# Patient Record
Sex: MALE | Race: WHITE | NOT HISPANIC OR LATINO | Employment: OTHER | URBAN - METROPOLITAN AREA
[De-identification: names, ages, dates, MRNs, and addresses within clinical notes are randomized per-mention and may not be internally consistent; named-entity substitution may affect disease eponyms.]

---

## 2021-09-20 ENCOUNTER — APPOINTMENT (OUTPATIENT)
Dept: GENERAL RADIOLOGY | Facility: CLINIC | Age: 61
End: 2021-09-20
Attending: PHYSICIAN ASSISTANT
Payer: COMMERCIAL

## 2021-09-20 ENCOUNTER — HOSPITAL ENCOUNTER (EMERGENCY)
Facility: CLINIC | Age: 61
Discharge: HOME | End: 2021-09-20
Attending: PHYSICIAN ASSISTANT | Admitting: PHYSICIAN ASSISTANT
Payer: COMMERCIAL

## 2021-09-20 VITALS
WEIGHT: 197 LBS | RESPIRATION RATE: 18 BRPM | OXYGEN SATURATION: 96 % | DIASTOLIC BLOOD PRESSURE: 73 MMHG | TEMPERATURE: 98.2 F | SYSTOLIC BLOOD PRESSURE: 141 MMHG | HEART RATE: 82 BPM

## 2021-09-20 DIAGNOSIS — R50.9 FEVER: ICD-10-CM

## 2021-09-20 DIAGNOSIS — R50.9 FEVER, UNSPECIFIED FEVER CAUSE: ICD-10-CM

## 2021-09-20 DIAGNOSIS — D72.829 LEUKOCYTOSIS: ICD-10-CM

## 2021-09-20 DIAGNOSIS — R05.9 COUGH: ICD-10-CM

## 2021-09-20 LAB
ALBUMIN UR-MCNC: NEGATIVE MG/DL
ANION GAP SERPL CALCULATED.3IONS-SCNC: 8 MMOL/L (ref 3–14)
APPEARANCE UR: CLEAR
ATRIAL RATE - MUSE: 65 BPM
BASOPHILS # BLD AUTO: 0 10E3/UL (ref 0–0.2)
BASOPHILS NFR BLD AUTO: 0 %
BILIRUB UR QL STRIP: NEGATIVE
BUN SERPL-MCNC: 18 MG/DL (ref 7–30)
CALCIUM SERPL-MCNC: 8.7 MG/DL (ref 8.5–10.1)
CHLORIDE BLD-SCNC: 102 MMOL/L (ref 94–109)
CO2 SERPL-SCNC: 21 MMOL/L (ref 20–32)
COLOR UR AUTO: ABNORMAL
CREAT SERPL-MCNC: 1.53 MG/DL (ref 0.66–1.25)
DIASTOLIC BLOOD PRESSURE - MUSE: NORMAL MMHG
EOSINOPHIL # BLD AUTO: 0 10E3/UL (ref 0–0.7)
EOSINOPHIL NFR BLD AUTO: 0 %
ERYTHROCYTE [DISTWIDTH] IN BLOOD BY AUTOMATED COUNT: 12.8 % (ref 10–15)
GFR SERPL CREATININE-BSD FRML MDRD: 48 ML/MIN/1.73M2
GLUCOSE BLD-MCNC: 119 MG/DL (ref 70–99)
GLUCOSE UR STRIP-MCNC: NEGATIVE MG/DL
HCT VFR BLD AUTO: 40.4 % (ref 40–53)
HGB BLD-MCNC: 13.4 G/DL (ref 13.3–17.7)
HGB UR QL STRIP: ABNORMAL
IMM GRANULOCYTES # BLD: 0.1 10E3/UL
IMM GRANULOCYTES NFR BLD: 1 %
INTERPRETATION ECG - MUSE: NORMAL
KETONES UR STRIP-MCNC: NEGATIVE MG/DL
LEUKOCYTE ESTERASE UR QL STRIP: NEGATIVE
LYMPHOCYTES # BLD AUTO: 0.7 10E3/UL (ref 0.8–5.3)
LYMPHOCYTES NFR BLD AUTO: 5 %
MCH RBC QN AUTO: 28.3 PG (ref 26.5–33)
MCHC RBC AUTO-ENTMCNC: 33.2 G/DL (ref 31.5–36.5)
MCV RBC AUTO: 85 FL (ref 78–100)
MONOCYTES # BLD AUTO: 1.3 10E3/UL (ref 0–1.3)
MONOCYTES NFR BLD AUTO: 8 %
NEUTROPHILS # BLD AUTO: 13.1 10E3/UL (ref 1.6–8.3)
NEUTROPHILS NFR BLD AUTO: 86 %
NITRATE UR QL: NEGATIVE
NRBC # BLD AUTO: 0 10E3/UL
NRBC BLD AUTO-RTO: 0 /100
P AXIS - MUSE: 45 DEGREES
PH UR STRIP: 6 [PH] (ref 5–7)
PLATELET # BLD AUTO: 271 10E3/UL (ref 150–450)
POTASSIUM BLD-SCNC: 4 MMOL/L (ref 3.4–5.3)
PR INTERVAL - MUSE: 128 MS
QRS DURATION - MUSE: 96 MS
QT - MUSE: 464 MS
QTC - MUSE: 482 MS
R AXIS - MUSE: 0 DEGREES
RBC # BLD AUTO: 4.74 10E6/UL (ref 4.4–5.9)
RBC URINE: 2 /HPF
SARS-COV-2 RNA RESP QL NAA+PROBE: NEGATIVE
SODIUM SERPL-SCNC: 131 MMOL/L (ref 133–144)
SP GR UR STRIP: 1 (ref 1–1.03)
SYSTOLIC BLOOD PRESSURE - MUSE: NORMAL MMHG
T AXIS - MUSE: -9 DEGREES
TROPONIN I SERPL-MCNC: <0.015 UG/L (ref 0–0.04)
UROBILINOGEN UR STRIP-MCNC: NORMAL MG/DL
VENTRICULAR RATE- MUSE: 65 BPM
WBC # BLD AUTO: 15.2 10E3/UL (ref 4–11)
WBC URINE: <1 /HPF

## 2021-09-20 PROCEDURE — 80048 BASIC METABOLIC PNL TOTAL CA: CPT | Performed by: PHYSICIAN ASSISTANT

## 2021-09-20 PROCEDURE — 71045 X-RAY EXAM CHEST 1 VIEW: CPT

## 2021-09-20 PROCEDURE — 84484 ASSAY OF TROPONIN QUANT: CPT | Performed by: PHYSICIAN ASSISTANT

## 2021-09-20 PROCEDURE — U0003 INFECTIOUS AGENT DETECTION BY NUCLEIC ACID (DNA OR RNA); SEVERE ACUTE RESPIRATORY SYNDROME CORONAVIRUS 2 (SARS-COV-2) (CORONAVIRUS DISEASE [COVID-19]), AMPLIFIED PROBE TECHNIQUE, MAKING USE OF HIGH THROUGHPUT TECHNOLOGIES AS DESCRIBED BY CMS-2020-01-R: HCPCS | Performed by: PHYSICIAN ASSISTANT

## 2021-09-20 PROCEDURE — 258N000003 HC RX IP 258 OP 636: Performed by: PHYSICIAN ASSISTANT

## 2021-09-20 PROCEDURE — 96360 HYDRATION IV INFUSION INIT: CPT

## 2021-09-20 PROCEDURE — C9803 HOPD COVID-19 SPEC COLLECT: HCPCS

## 2021-09-20 PROCEDURE — 93005 ELECTROCARDIOGRAM TRACING: CPT

## 2021-09-20 PROCEDURE — 99285 EMERGENCY DEPT VISIT HI MDM: CPT | Mod: 25

## 2021-09-20 PROCEDURE — 36415 COLL VENOUS BLD VENIPUNCTURE: CPT | Performed by: PHYSICIAN ASSISTANT

## 2021-09-20 PROCEDURE — 81001 URINALYSIS AUTO W/SCOPE: CPT | Performed by: PHYSICIAN ASSISTANT

## 2021-09-20 PROCEDURE — 85025 COMPLETE CBC W/AUTO DIFF WBC: CPT | Performed by: PHYSICIAN ASSISTANT

## 2021-09-20 RX ORDER — ONDANSETRON 2 MG/ML
4 INJECTION INTRAMUSCULAR; INTRAVENOUS ONCE
Status: DISCONTINUED | OUTPATIENT
Start: 2021-09-20 | End: 2021-09-20 | Stop reason: HOSPADM

## 2021-09-20 RX ORDER — DOXYCYCLINE 100 MG/1
100 CAPSULE ORAL 2 TIMES DAILY
Qty: 14 CAPSULE | Refills: 0 | Status: ON HOLD | OUTPATIENT
Start: 2021-09-20 | End: 2021-09-25

## 2021-09-20 RX ORDER — DOXYCYCLINE 100 MG/1
100 CAPSULE ORAL 2 TIMES DAILY
Qty: 14 CAPSULE | Refills: 0 | Status: SHIPPED | OUTPATIENT
Start: 2021-09-20 | End: 2021-09-20

## 2021-09-20 RX ADMIN — SODIUM CHLORIDE 1000 ML: 9 INJECTION, SOLUTION INTRAVENOUS at 12:38

## 2021-09-20 ASSESSMENT — ENCOUNTER SYMPTOMS
DIARRHEA: 1
BLOOD IN STOOL: 0
SHORTNESS OF BREATH: 1
COUGH: 1
VOMITING: 0
FEVER: 1
ABDOMINAL PAIN: 0
APPETITE CHANGE: 1

## 2021-09-20 NOTE — ED PROVIDER NOTES
History   Chief Complaint:  Cough and Fever     The history is provided by the patient and medical records.      Boom Daniel is a 61 year old male with history of CKD, JARED, Alexander's esophagus, and hyperlipidemia who presents with two days of non-bloody diarrhea and decreased appetite. He also notes two weeks of cough and today noted a fever. He took ibuprofen prior to arrival. He reports associated shortness of breath and denies chest pain, vomiting, abdominal pain, urinary symptoms, sore throat, or any other concerning symptoms. Of note the patient is fully vaccinated for Covid.     Review of Systems   Constitutional: Positive for appetite change (decrease) and fever.   Respiratory: Positive for cough and shortness of breath.    Cardiovascular: Negative for chest pain.   Gastrointestinal: Positive for diarrhea. Negative for abdominal pain, blood in stool and vomiting.   All other systems reviewed and are negative.        Allergies:  No known drug allergies    Medications:  Flonase  Nicotine  Prilosec  Cozaar    Past Medical History:    Hyperopia  Cataract  Choroidal Nevus, let  CKD, stage 2  Acute Gout Involving Toe of Left Foot  JARED on CPAP  Alexander's Esophagus  Chronic Rhinitis  Hyperlipidemia  Tobacco use Disorder  Esophageal Reflux  Deviated Nasal Septum  MRSA Nasal Colonization    Past Surgical History:    Tonsillectomy    Family History:    Father - Gout, Heart Attack, High Cholesterol, Obesity  Brother - Prostate Cancer    Social History:  Arrives alone    Physical Exam     Patient Vitals for the past 24 hrs:   BP Temp Temp src Pulse Resp SpO2 Weight   09/20/21 1500 (!) 141/73 98.2  F (36.8  C) -- -- -- 96 % --   09/20/21 1150 -- -- -- -- -- -- 89.4 kg (197 lb)   09/20/21 1148 109/65 98.8  F (37.1  C) Oral 82 18 96 % --       Physical Exam  General: Alert, cooperative. Well appearing.   Head:  Scalp is atraumatic.  Eyes:  The pupils are equal, round, and reactive to light. Normal conjunctiva.   ENT:                                       Ears:  The external ears are normal. TM's non-erythematous. External canals normal.    Nose:  The external nose is normal.  Throat:  The oropharynx is normal without erythema. Mucus membranes are dry.                 Neck:  Normal range of motion.   CV:  Normal rate. No murmur. 2+ radial pulses  Resp:  Breath sounds are clear bilaterally. Non-labored, no retractions or accessory muscle use.  GI:  Abdomen is soft, no distension, no tenderness.   MS:  Normal range of motion. No acute deformities.   Skin:  Warm and dry. No rash.   Neuro:  Alert. Strength and sensation grossly intact.   Psych:  Awake. Alert.  Appropriate interactions.     Emergency Department Course   ECG (14:58:23):  Indication: Screening for cardiovascular disease.   Rate 65 bpm. IN interval 128. QRS duration 96. QT/QTc 464/482. P-R-T axes 45 0 -9.   Interpretation: Sinus rhythm with premature atrial complexes. Nonspecific ST abnormality. Prolonged QT. Abnormal ECG.     Imaging:  XR Chest 1 view  IMPRESSION: Unremarkable single view of the chest.   Reading per radiology    Laboratory:  CBC: WBC 15.2 (H), HGB 13.4,    BMP: Glucose 119 (H), GFR 48 (L), Sodium 131 (L), o/w WNL (Creatinine 1.53 (H))     Troponin: (Collected 1239) <0.015    UA: Blood Small (!), o/w Negative    Symptomatic Influenza A/B & SARS-CoV2 (COVID19) Virus PCR Multiplex: neg    Emergency Department Course:    Reviewed:  I reviewed nursing notes, vitals, past medical history and care everywhere    Assessments:  1225 I obtained history and examined the patient as noted above.   1340 I rechecked the patient and explained findings.  Repeat abdominal examination remains benign without tenderness.  1533 Patient rechecked and updated.   1545 Set for discharge.    Interventions:  1238 Normal Saline 1000 mL IV    Disposition:  The patient was discharged to home.     Impression & Plan   Medical Decision Making:  Boom Daniel is a 61 year old male  presents to the emergency department with decreased appetite, nonbloody diarrhea, cough, and fever.  Vitals and examination unremarkable besides dry mucous membranes.  Patient is afebrile here.  Lungs clear to auscultation.  Chest x-ray without evidence of pneumonia.  Covid 19 currently pending upon his discharge, but returned in the meantime negative.  EKG without ischemic changes and screening troponin negative.  He has no chest pain and no suspicion for acute coronary syndrome.  Patient reports decreased appetite over the last 24 hours and has had decreased p.o. intake.  Leukocytosis of 15.2, at this time, discussed unclear source at this time.  Certainly could be due to dehydration.  Urine without sign of infection.  Patient had no abdominal pain and initial and repeat abdominal examination benign without tenderness.  No indication for CT at this time given the benign abdomen. He notes diarrhea since yesterday, he was unable to provide a stool sample here and I believe C. difficile and infectious diarrhea less likely.      Patient reports 2 weeks of progressively worsening cough and given the fever today, will cover for possible atypical pneumonia with doxycycline.  I explained the unclear source of fever and leukocytosis at this time and importance of strict return precautions should any new or concerning symptoms develop including persistent fevers, abdominal pain, or any other concerning symptoms develop.    Covid-19  Boom Daniel was evaluated during a global COVID-19 pandemic, which necessitated consideration that the patient might be at risk for infection with the SARS-CoV-2 virus that causes COVID-19.   Applicable protocols for evaluation were followed during the patient's care.   COVID-19 was considered as part of the patient's evaluation. The plan for testing is:  a test was obtained during this visit.    Diagnosis:    ICD-10-CM    1. Fever  R50.9    2. Cough  R05    3. Leukocytosis  D72.829         Discharge Medications:  New Prescriptions    DOXYCYCLINE HYCLATE (VIBRAMYCIN) 100 MG CAPSULE    Take 1 capsule (100 mg) by mouth 2 times daily for 7 days       Scribe Disclosure:  I, Selena Bridges, am serving as a scribe at 11:58 AM on 9/20/2021 to document services personally performed by Margarita Cali PA-C based on my observations and the provider's statements to me.      Margarita Cali PA-C  09/20/21 5547

## 2021-09-20 NOTE — DISCHARGE INSTRUCTIONS
Discharge Instructions  Bronchitis, Pneumonia, Bronchospasm    You were seen today for a chest infection or inflammation. If your provider decided this was due to a bacterial infection, you may need an antibiotic. Sometimes these are caused by a virus, and then an antibiotic will not help.     Generally, every Emergency Department visit should have a follow-up clinic visit with either a primary or a specialty clinic/provider. Please follow-up as instructed by your emergency provider today.    Return to the Emergency Department if:  Your breathing gets much worse.  You are very weak, or feel much more ill.  You develop new symptoms, such as chest pain.  You cough up blood.  You are vomiting (throwing up) enough that you cannot keep fluids or your medicine down.    What can I do to help myself?  Fill any prescriptions the provider gave you and take them right away--especially antibiotics. Be sure to finish the whole antibiotic prescription.  You may be given a prescription for an inhaler, which can help loosen tight air passages.  Use this as needed, but not more often than directed. Inhalers work much better when used with a spacer.   You may be given a prescription for a steroid to reduce inflammation. Used long-term, these can have side effects, but for short-term use they are safe. You may notice restlessness or increased appetite.      You may use non-prescription cough or cold medicines. Cough medicines may help, but don t make the cough go away completely.   Avoid smoke, because this can make your symptoms worse. If you smoke, this may be a good time to quit! Consider using nicotine lozenges, gum, or patches to reduce cravings.   If you have a fever, Tylenol  (acetaminophen), Motrin  (ibuprofen), or Advil  (ibuprofen) may help bring fever down and may help you feel more comfortable. Be sure to read and follow the package directions, and ask your provider if you have questions.  Be sure to get your flu shot each  year.  For certain ages, the pneumonia shot can help prevent pneumonia.  If you were given a prescription for medicine here today, be sure to read all of the information (including the package insert) that comes with your prescription.  This will include important information about the medicine, its side effects, and any warnings that you need to know about.  The pharmacist who fills the prescription can provide more information and answer questions you may have about the medicine.  If you have questions or concerns that the pharmacist cannot address, please call or return to the Emergency Department.     Remember that you can always come back to the Emergency Department if you are not able to see your regular provider in the amount of time listed above, if you get any new symptoms, or if there is anything that worries you.       Discharge Instructions  COVID-19    COVID-19 is the disease caused by a new coronavirus. The virus spreads from person-to-person primarily by droplets when an infected person coughs or sneezes and the droplets are then breathed in by another person. There are tests available to diagnose COVID-19. You may have been diagnosed with COVID, may be being tested for COVID and have a pending test result, or may have been exposed to COVID.    Symptoms of COVID-19  Many people have no symptoms or mild symptoms.  Symptoms may usually appear 4 to 5 days (up to 14 days) after contact with a person with COVID-19. Some people will get severe symptoms and pneumonia. Usual symptoms are:     ? Fever  ? Cough  ? Trouble breathing    Less common symptoms are: Headache, body aches, sore throat, sneezing, diarrhea, loss of taste or smell.    Isolation and Quarantine    You may have been seen because you have symptoms, had an exposure, or had some other concern about possible COVID. The best way to stop the spread of the virus is to avoid contact with others.    Isolation refers to sick people staying away from  people who are not sick. A person in quarantine is limiting activity because they were exposed and are waiting to see if they might become sick.    If you test positive for COVID, you should stay home (isolation) for at least 10 days after your symptoms began, and for 24 hours with no fever and improvement of symptoms--whichever is longer. (Your fever should be gone for 24 hours without using fever-reducing medicine). If you have no symptoms, you should stay home (isolation) for 10 days from the day of the test. If you have been vaccinated for COVID, the vaccination will not cause you to test positive so a positive test result generally is a  true positive .    For example, if you have a fever and cough for 6 days, you need to stay home 4 more days with no fever for a total of 10 days. Or, if you have a fever and cough for 10 days, you need to stay home one more day with no fever for a total of 11 days.    If you have a high-risk exposure to COVID (you spent 15 minutes or more within six feet of somebody who has COVID), you should stay home (quarantine) for 14 days, unless you are vaccinated. Even if you test negative for COVID, the CDC recommends a 14-day quarantine from the time of your last exposure to that individual (unless you are vaccinated). There are options for a shortened (<14 day quarantine) you can review at:  https://www.[email or web address].[place].us/diseases/coronavirus/close.html#long    If you live in the same house as somebody with COVID and cannot separate from them, you will need to quarantine for 14-days after that person's isolation (infectious) period. That means that you may need to quarantine for 24-days after that person became symptomatic/ill.    If you are vaccinated and do not develop symptoms, you do not need to quarantine after exposure.    If you have symptoms but a negative test, you should stay at home until you are symptom-free and without fever for 24 hours, using the same judgment you would  for when it is safe to return to work/school from strep throat, influenza, or the common cold. If you worsen, you should consider being re-evaluated.    If you are being tested for COVID because of symptoms and your test is pending, you should stay home until you know your test result.    If I have COVID, how should I protect myself and others?    Do not go to work or school. Have a friend or relative do your shopping. Do not use public transportation (bus, train) or ridesharing (Lyft, Uber).    Separate yourself from other people in your home. As much as possible, you should stay in one room and away from other people in your home. Also, use a separate bathroom, if possible. Avoid handling pets or other animals while sick.     Wear a facemask if you need to be around other people and cover your mouth and nose with a tissue when you cough or sneeze.     Avoid sharing personal household items. You should not share dishes, drinking glasses, forks/knives/spoons, towels, or bedding with other people in your home. After using these items, they should be washed with soap and water. Clean parts of your home that are touched often (doorknobs, faucets, countertops, etc.) daily.     Wash your hands often with soap and water for at least 20 seconds or use an alcohol-based hand  containing at least 60% alcohol.     Avoid touching your face.    Treat your symptoms. You can take Acetaminophen (Tylenol) to treat body aches and fever as needed for comfort. Ibuprofen (Advil or Motrin) can be used as well if you still have symptoms after taking Tylenol. Drink fluids. Rest.    Watch for worsening symptoms such as shortness of breath/difficulty breathing or very severe weakness.    Employers/workplaces are being asked by the Centers for Disease Control (CDC) to not request notes/documentation for you to return to work or prove that you were ill. You may choose to show your employer this paperwork. Also, repeat testing should  not be required to return to work.    Exercise/Sports in rare cases, COVID could affect your heart in a way that makes exercise or participation in sports dangerous.    If you have a mild COVID illness (fever, cough, sore throat, and similar symptoms but no difficulty breathing or abnormalities of the lung): After your COVID symptoms have resolved, wait 14-days before returning to activity.  If you have more than a mild illness (meaning that you have problems with your breathing or lungs) or if you participate in competitive or strenuous activity or have a history of heart disease: Please see your primary doctor/provider prior to return to activity/competition.    Antibody treatments are available for patients with mild to moderate COVID illness in order to prevent severe illness. In general, only patients with risk factors for severe illness are eligible for treatment. For more information, to see if you are eligible, and to find treatment, go to the [facility]:  [email or web address]./diseases/coronavirus/mnrap.html     Return to the Emergency Department if:    If you are developing worsening breathing, shortness of breath, or feel worse you should seek medical attention.  If you are uncertain, contact your health care provider/clinic. If you need emergency medical attention, call 911 and tell them you have been ill.

## 2021-09-20 NOTE — ED TRIAGE NOTES
Pt reports cough for the past 2 weeks with fevers starting today. Temp was 101, took ibuprofen this AM

## 2021-09-21 ENCOUNTER — HOSPITAL ENCOUNTER (INPATIENT)
Facility: CLINIC | Age: 61
LOS: 3 days | Discharge: HOME | End: 2021-09-25
Attending: EMERGENCY MEDICINE | Admitting: INTERNAL MEDICINE
Payer: COMMERCIAL

## 2021-09-21 DIAGNOSIS — K57.20 DIVERTICULITIS OF LARGE INTESTINE WITH ABSCESS, UNSPECIFIED BLEEDING STATUS: Primary | ICD-10-CM

## 2021-09-21 DIAGNOSIS — R91.1 PULMONARY NODULE: ICD-10-CM

## 2021-09-21 DIAGNOSIS — R50.9 FEVER, UNSPECIFIED FEVER CAUSE: ICD-10-CM

## 2021-09-21 DIAGNOSIS — I82.0 HEPATIC VEIN THROMBOSIS (H): ICD-10-CM

## 2021-09-21 DIAGNOSIS — A41.9 SEPSIS, DUE TO UNSPECIFIED ORGANISM, UNSPECIFIED WHETHER ACUTE ORGAN DYSFUNCTION PRESENT (H): ICD-10-CM

## 2021-09-21 LAB
ALBUMIN SERPL-MCNC: 2.7 G/DL (ref 3.4–5)
ALBUMIN UR-MCNC: 30 MG/DL
ALP SERPL-CCNC: 148 U/L (ref 40–150)
ALT SERPL W P-5'-P-CCNC: 53 U/L (ref 0–70)
ANION GAP SERPL CALCULATED.3IONS-SCNC: 12 MMOL/L (ref 3–14)
APPEARANCE UR: CLEAR
AST SERPL W P-5'-P-CCNC: 35 U/L (ref 0–45)
BASOPHILS # BLD AUTO: 0 10E3/UL (ref 0–0.2)
BASOPHILS NFR BLD AUTO: 0 %
BILIRUB SERPL-MCNC: 0.7 MG/DL (ref 0.2–1.3)
BILIRUB UR QL STRIP: NEGATIVE
BUN SERPL-MCNC: 17 MG/DL (ref 7–30)
CALCIUM SERPL-MCNC: 8.4 MG/DL (ref 8.5–10.1)
CHLORIDE BLD-SCNC: 100 MMOL/L (ref 94–109)
CO2 SERPL-SCNC: 19 MMOL/L (ref 20–32)
COLOR UR AUTO: YELLOW
CREAT SERPL-MCNC: 1.53 MG/DL (ref 0.66–1.25)
D DIMER PPP FEU-MCNC: 2.84 UG/ML FEU (ref 0–0.5)
EOSINOPHIL # BLD AUTO: 0 10E3/UL (ref 0–0.7)
EOSINOPHIL NFR BLD AUTO: 0 %
ERYTHROCYTE [DISTWIDTH] IN BLOOD BY AUTOMATED COUNT: 12.8 % (ref 10–15)
GFR SERPL CREATININE-BSD FRML MDRD: 48 ML/MIN/1.73M2
GLUCOSE BLD-MCNC: 105 MG/DL (ref 70–99)
GLUCOSE UR STRIP-MCNC: NEGATIVE MG/DL
HCT VFR BLD AUTO: 40.8 % (ref 40–53)
HGB BLD-MCNC: 13.6 G/DL (ref 13.3–17.7)
HGB UR QL STRIP: ABNORMAL
HOLD SPECIMEN: NORMAL
HYALINE CASTS: 3 /LPF
IMM GRANULOCYTES # BLD: 0 10E3/UL
IMM GRANULOCYTES NFR BLD: 0 %
KETONES UR STRIP-MCNC: NEGATIVE MG/DL
LEUKOCYTE ESTERASE UR QL STRIP: NEGATIVE
LYMPHOCYTES # BLD AUTO: 0.4 10E3/UL (ref 0.8–5.3)
LYMPHOCYTES NFR BLD AUTO: 8 %
MCH RBC QN AUTO: 28 PG (ref 26.5–33)
MCHC RBC AUTO-ENTMCNC: 33.3 G/DL (ref 31.5–36.5)
MCV RBC AUTO: 84 FL (ref 78–100)
MONOCYTES # BLD AUTO: 0.1 10E3/UL (ref 0–1.3)
MONOCYTES NFR BLD AUTO: 1 %
MUCOUS THREADS #/AREA URNS LPF: PRESENT /LPF
NEUTROPHILS # BLD AUTO: 4.7 10E3/UL (ref 1.6–8.3)
NEUTROPHILS NFR BLD AUTO: 91 %
NITRATE UR QL: NEGATIVE
NRBC # BLD AUTO: 0 10E3/UL
NRBC BLD AUTO-RTO: 0 /100
PH UR STRIP: 5.5 [PH] (ref 5–7)
PLATELET # BLD AUTO: 261 10E3/UL (ref 150–450)
POTASSIUM BLD-SCNC: 3.8 MMOL/L (ref 3.4–5.3)
PROT SERPL-MCNC: 7.5 G/DL (ref 6.8–8.8)
RBC # BLD AUTO: 4.85 10E6/UL (ref 4.4–5.9)
RBC URINE: 10 /HPF
SARS-COV-2 RNA RESP QL NAA+PROBE: NEGATIVE
SODIUM SERPL-SCNC: 131 MMOL/L (ref 133–144)
SP GR UR STRIP: 1.02 (ref 1–1.03)
SQUAMOUS EPITHELIAL: <1 /HPF
TROPONIN I SERPL-MCNC: <0.015 UG/L (ref 0–0.04)
UROBILINOGEN UR STRIP-MCNC: NORMAL MG/DL
WBC # BLD AUTO: 5.2 10E3/UL (ref 4–11)
WBC URINE: 2 /HPF

## 2021-09-21 PROCEDURE — 80053 COMPREHEN METABOLIC PANEL: CPT | Performed by: EMERGENCY MEDICINE

## 2021-09-21 PROCEDURE — 258N000003 HC RX IP 258 OP 636: Performed by: EMERGENCY MEDICINE

## 2021-09-21 PROCEDURE — C9803 HOPD COVID-19 SPEC COLLECT: HCPCS

## 2021-09-21 PROCEDURE — 36415 COLL VENOUS BLD VENIPUNCTURE: CPT | Performed by: EMERGENCY MEDICINE

## 2021-09-21 PROCEDURE — 85379 FIBRIN DEGRADATION QUANT: CPT | Performed by: EMERGENCY MEDICINE

## 2021-09-21 PROCEDURE — 96361 HYDRATE IV INFUSION ADD-ON: CPT

## 2021-09-21 PROCEDURE — 99285 EMERGENCY DEPT VISIT HI MDM: CPT | Mod: 25

## 2021-09-21 PROCEDURE — 85004 AUTOMATED DIFF WBC COUNT: CPT | Performed by: EMERGENCY MEDICINE

## 2021-09-21 PROCEDURE — 87635 SARS-COV-2 COVID-19 AMP PRB: CPT | Performed by: EMERGENCY MEDICINE

## 2021-09-21 PROCEDURE — 87181 SC STD AGAR DILUTION PER AGT: CPT | Performed by: EMERGENCY MEDICINE

## 2021-09-21 PROCEDURE — 87149 DNA/RNA DIRECT PROBE: CPT | Performed by: EMERGENCY MEDICINE

## 2021-09-21 PROCEDURE — 84484 ASSAY OF TROPONIN QUANT: CPT | Performed by: EMERGENCY MEDICINE

## 2021-09-21 PROCEDURE — 81001 URINALYSIS AUTO W/SCOPE: CPT | Performed by: EMERGENCY MEDICINE

## 2021-09-21 PROCEDURE — 250N000013 HC RX MED GY IP 250 OP 250 PS 637: Performed by: EMERGENCY MEDICINE

## 2021-09-21 PROCEDURE — 93005 ELECTROCARDIOGRAM TRACING: CPT

## 2021-09-21 RX ORDER — IBUPROFEN 600 MG/1
600 TABLET, FILM COATED ORAL ONCE
Status: COMPLETED | OUTPATIENT
Start: 2021-09-21 | End: 2021-09-21

## 2021-09-21 RX ADMIN — SODIUM CHLORIDE 1000 ML: 9 INJECTION, SOLUTION INTRAVENOUS at 23:01

## 2021-09-21 RX ADMIN — IBUPROFEN 600 MG: 600 TABLET ORAL at 22:17

## 2021-09-21 ASSESSMENT — ENCOUNTER SYMPTOMS
BLOOD IN STOOL: 0
SHORTNESS OF BREATH: 1
FEVER: 1
COUGH: 1
BACK PAIN: 0
NECK PAIN: 0
DIFFICULTY URINATING: 0

## 2021-09-22 ENCOUNTER — APPOINTMENT (OUTPATIENT)
Dept: CT IMAGING | Facility: CLINIC | Age: 61
End: 2021-09-22
Attending: INTERNAL MEDICINE
Payer: COMMERCIAL

## 2021-09-22 ENCOUNTER — APPOINTMENT (OUTPATIENT)
Dept: CT IMAGING | Facility: CLINIC | Age: 61
End: 2021-09-22
Attending: EMERGENCY MEDICINE
Payer: COMMERCIAL

## 2021-09-22 PROBLEM — A41.9 SEPSIS (H): Status: ACTIVE | Noted: 2021-09-22

## 2021-09-22 PROBLEM — K57.20 DIVERTICULITIS OF LARGE INTESTINE WITH ABSCESS, UNSPECIFIED BLEEDING STATUS: Status: ACTIVE | Noted: 2021-09-22

## 2021-09-22 PROBLEM — R50.9 FEVER, UNSPECIFIED FEVER CAUSE: Status: ACTIVE | Noted: 2021-09-22

## 2021-09-22 LAB
ALBUMIN SERPL-MCNC: 2.5 G/DL (ref 3.4–5)
ALP SERPL-CCNC: 131 U/L (ref 40–150)
ALT SERPL W P-5'-P-CCNC: 44 U/L (ref 0–70)
ANION GAP SERPL CALCULATED.3IONS-SCNC: 13 MMOL/L (ref 3–14)
ANION GAP SERPL CALCULATED.3IONS-SCNC: 9 MMOL/L (ref 3–14)
AST SERPL W P-5'-P-CCNC: 27 U/L (ref 0–45)
BILIRUB SERPL-MCNC: 1 MG/DL (ref 0.2–1.3)
BUN SERPL-MCNC: 15 MG/DL (ref 7–30)
BUN SERPL-MCNC: 16 MG/DL (ref 7–30)
CALCIUM SERPL-MCNC: 7.9 MG/DL (ref 8.5–10.1)
CALCIUM SERPL-MCNC: 8.4 MG/DL (ref 8.5–10.1)
CHLORIDE BLD-SCNC: 108 MMOL/L (ref 94–109)
CHLORIDE BLD-SCNC: 111 MMOL/L (ref 94–109)
CO2 SERPL-SCNC: 19 MMOL/L (ref 20–32)
CO2 SERPL-SCNC: 20 MMOL/L (ref 20–32)
CREAT SERPL-MCNC: 1.26 MG/DL (ref 0.66–1.25)
CREAT SERPL-MCNC: 1.31 MG/DL (ref 0.66–1.25)
ERYTHROCYTE [DISTWIDTH] IN BLOOD BY AUTOMATED COUNT: 13.1 % (ref 10–15)
GFR SERPL CREATININE-BSD FRML MDRD: 58 ML/MIN/1.73M2
GFR SERPL CREATININE-BSD FRML MDRD: 61 ML/MIN/1.73M2
GLUCOSE BLD-MCNC: 81 MG/DL (ref 70–99)
GLUCOSE BLD-MCNC: 95 MG/DL (ref 70–99)
GLUCOSE BLDC GLUCOMTR-MCNC: 68 MG/DL (ref 70–99)
GLUCOSE BLDC GLUCOMTR-MCNC: 76 MG/DL (ref 70–99)
HCT VFR BLD AUTO: 40.8 % (ref 40–53)
HGB BLD-MCNC: 13.3 G/DL (ref 13.3–17.7)
INR PPP: 1.23 (ref 0.85–1.15)
LACTATE SERPL-SCNC: 1 MMOL/L (ref 0.7–2)
LACTATE SERPL-SCNC: 3.7 MMOL/L (ref 0.7–2)
MCH RBC QN AUTO: 27.9 PG (ref 26.5–33)
MCHC RBC AUTO-ENTMCNC: 32.6 G/DL (ref 31.5–36.5)
MCV RBC AUTO: 86 FL (ref 78–100)
PLATELET # BLD AUTO: 248 10E3/UL (ref 150–450)
POTASSIUM BLD-SCNC: 3.8 MMOL/L (ref 3.4–5.3)
POTASSIUM BLD-SCNC: 4.1 MMOL/L (ref 3.4–5.3)
PROT SERPL-MCNC: 7.2 G/DL (ref 6.8–8.8)
RBC # BLD AUTO: 4.76 10E6/UL (ref 4.4–5.9)
SODIUM SERPL-SCNC: 140 MMOL/L (ref 133–144)
SODIUM SERPL-SCNC: 140 MMOL/L (ref 133–144)
WBC # BLD AUTO: 6.9 10E3/UL (ref 4–11)

## 2021-09-22 PROCEDURE — 87070 CULTURE OTHR SPECIMN AEROBIC: CPT | Performed by: INTERNAL MEDICINE

## 2021-09-22 PROCEDURE — 250N000013 HC RX MED GY IP 250 OP 250 PS 637: Performed by: INTERNAL MEDICINE

## 2021-09-22 PROCEDURE — 250N000013 HC RX MED GY IP 250 OP 250 PS 637: Performed by: SPECIALIST

## 2021-09-22 PROCEDURE — 250N000011 HC RX IP 250 OP 636: Performed by: SPECIALIST

## 2021-09-22 PROCEDURE — 87040 BLOOD CULTURE FOR BACTERIA: CPT | Performed by: NURSE PRACTITIONER

## 2021-09-22 PROCEDURE — 250N000009 HC RX 250: Performed by: EMERGENCY MEDICINE

## 2021-09-22 PROCEDURE — 120N000004 HC R&B MS OVERFLOW

## 2021-09-22 PROCEDURE — 87181 SC STD AGAR DILUTION PER AGT: CPT | Performed by: INTERNAL MEDICINE

## 2021-09-22 PROCEDURE — 99152 MOD SED SAME PHYS/QHP 5/>YRS: CPT

## 2021-09-22 PROCEDURE — 87205 SMEAR GRAM STAIN: CPT | Performed by: INTERNAL MEDICINE

## 2021-09-22 PROCEDURE — 999N000154 HC STATISTIC RADIOLOGY XRAY, US, CT, MAR, NM

## 2021-09-22 PROCEDURE — 87075 CULTR BACTERIA EXCEPT BLOOD: CPT | Performed by: INTERNAL MEDICINE

## 2021-09-22 PROCEDURE — 96365 THER/PROPH/DIAG IV INF INIT: CPT | Mod: 59

## 2021-09-22 PROCEDURE — 250N000011 HC RX IP 250 OP 636: Performed by: INTERNAL MEDICINE

## 2021-09-22 PROCEDURE — 36415 COLL VENOUS BLD VENIPUNCTURE: CPT | Performed by: NURSE PRACTITIONER

## 2021-09-22 PROCEDURE — 250N000011 HC RX IP 250 OP 636: Performed by: PHYSICIAN ASSISTANT

## 2021-09-22 PROCEDURE — 96375 TX/PRO/DX INJ NEW DRUG ADDON: CPT

## 2021-09-22 PROCEDURE — 85027 COMPLETE CBC AUTOMATED: CPT | Performed by: NURSE PRACTITIONER

## 2021-09-22 PROCEDURE — 83605 ASSAY OF LACTIC ACID: CPT | Performed by: NURSE PRACTITIONER

## 2021-09-22 PROCEDURE — 96374 THER/PROPH/DIAG INJ IV PUSH: CPT

## 2021-09-22 PROCEDURE — 36415 COLL VENOUS BLD VENIPUNCTURE: CPT | Performed by: INTERNAL MEDICINE

## 2021-09-22 PROCEDURE — 258N000003 HC RX IP 258 OP 636: Performed by: INTERNAL MEDICINE

## 2021-09-22 PROCEDURE — 258N000003 HC RX IP 258 OP 636: Performed by: NURSE PRACTITIONER

## 2021-09-22 PROCEDURE — 250N000011 HC RX IP 250 OP 636: Performed by: EMERGENCY MEDICINE

## 2021-09-22 PROCEDURE — 49406 IMAGE CATH FLUID PERI/RETRO: CPT

## 2021-09-22 PROCEDURE — 80048 BASIC METABOLIC PNL TOTAL CA: CPT | Performed by: INTERNAL MEDICINE

## 2021-09-22 PROCEDURE — 99223 1ST HOSP IP/OBS HIGH 75: CPT | Performed by: INTERNAL MEDICINE

## 2021-09-22 PROCEDURE — 85610 PROTHROMBIN TIME: CPT | Performed by: NURSE PRACTITIONER

## 2021-09-22 PROCEDURE — 99207 PR NO CHARGE LOS: CPT | Performed by: INTERNAL MEDICINE

## 2021-09-22 PROCEDURE — 71275 CT ANGIOGRAPHY CHEST: CPT

## 2021-09-22 PROCEDURE — 80053 COMPREHEN METABOLIC PANEL: CPT | Performed by: NURSE PRACTITIONER

## 2021-09-22 PROCEDURE — 99223 1ST HOSP IP/OBS HIGH 75: CPT | Mod: AI | Performed by: INTERNAL MEDICINE

## 2021-09-22 PROCEDURE — 99291 CRITICAL CARE FIRST HOUR: CPT | Performed by: NURSE PRACTITIONER

## 2021-09-22 PROCEDURE — 36415 COLL VENOUS BLD VENIPUNCTURE: CPT | Performed by: EMERGENCY MEDICINE

## 2021-09-22 PROCEDURE — 87040 BLOOD CULTURE FOR BACTERIA: CPT | Performed by: EMERGENCY MEDICINE

## 2021-09-22 RX ORDER — ACETAMINOPHEN 325 MG/1
650 TABLET ORAL EVERY 6 HOURS PRN
Status: DISCONTINUED | OUTPATIENT
Start: 2021-09-22 | End: 2021-09-25 | Stop reason: HOSPADM

## 2021-09-22 RX ORDER — NALOXONE HYDROCHLORIDE 0.4 MG/ML
0.4 INJECTION, SOLUTION INTRAMUSCULAR; INTRAVENOUS; SUBCUTANEOUS
Status: DISCONTINUED | OUTPATIENT
Start: 2021-09-22 | End: 2021-09-22

## 2021-09-22 RX ORDER — FLUMAZENIL 0.1 MG/ML
0.2 INJECTION, SOLUTION INTRAVENOUS
Status: DISCONTINUED | OUTPATIENT
Start: 2021-09-22 | End: 2021-09-23

## 2021-09-22 RX ORDER — LIDOCAINE 40 MG/G
CREAM TOPICAL
Status: DISCONTINUED | OUTPATIENT
Start: 2021-09-22 | End: 2021-09-25 | Stop reason: HOSPADM

## 2021-09-22 RX ORDER — HYDROMORPHONE HYDROCHLORIDE 2 MG/1
2 TABLET ORAL EVERY 4 HOURS PRN
Status: DISCONTINUED | OUTPATIENT
Start: 2021-09-22 | End: 2021-09-25 | Stop reason: HOSPADM

## 2021-09-22 RX ORDER — PIPERACILLIN SODIUM, TAZOBACTAM SODIUM 3; .375 G/15ML; G/15ML
3.38 INJECTION, POWDER, LYOPHILIZED, FOR SOLUTION INTRAVENOUS EVERY 6 HOURS
Status: DISCONTINUED | OUTPATIENT
Start: 2021-09-22 | End: 2021-09-22

## 2021-09-22 RX ORDER — NALOXONE HYDROCHLORIDE 0.4 MG/ML
0.2 INJECTION, SOLUTION INTRAMUSCULAR; INTRAVENOUS; SUBCUTANEOUS
Status: DISCONTINUED | OUTPATIENT
Start: 2021-09-22 | End: 2021-09-22

## 2021-09-22 RX ORDER — LOSARTAN POTASSIUM 50 MG/1
50 TABLET ORAL DAILY
COMMUNITY
Start: 2021-01-08

## 2021-09-22 RX ORDER — PIPERACILLIN SODIUM, TAZOBACTAM SODIUM 3; .375 G/15ML; G/15ML
3.38 INJECTION, POWDER, LYOPHILIZED, FOR SOLUTION INTRAVENOUS ONCE
Status: COMPLETED | OUTPATIENT
Start: 2021-09-22 | End: 2021-09-22

## 2021-09-22 RX ORDER — SODIUM CHLORIDE 9 MG/ML
INJECTION, SOLUTION INTRAVENOUS CONTINUOUS
Status: DISCONTINUED | OUTPATIENT
Start: 2021-09-22 | End: 2021-09-22

## 2021-09-22 RX ORDER — PROCHLORPERAZINE MALEATE 10 MG
10 TABLET ORAL EVERY 6 HOURS PRN
Status: DISCONTINUED | OUTPATIENT
Start: 2021-09-22 | End: 2021-09-25 | Stop reason: HOSPADM

## 2021-09-22 RX ORDER — ONDANSETRON 4 MG/1
4 TABLET, ORALLY DISINTEGRATING ORAL EVERY 6 HOURS PRN
Status: DISCONTINUED | OUTPATIENT
Start: 2021-09-22 | End: 2021-09-25 | Stop reason: HOSPADM

## 2021-09-22 RX ORDER — HYDROMORPHONE HYDROCHLORIDE 1 MG/ML
0.5 INJECTION, SOLUTION INTRAMUSCULAR; INTRAVENOUS; SUBCUTANEOUS
Status: DISCONTINUED | OUTPATIENT
Start: 2021-09-22 | End: 2021-09-25 | Stop reason: HOSPADM

## 2021-09-22 RX ORDER — NALOXONE HYDROCHLORIDE 0.4 MG/ML
0.4 INJECTION, SOLUTION INTRAMUSCULAR; INTRAVENOUS; SUBCUTANEOUS
Status: DISCONTINUED | OUTPATIENT
Start: 2021-09-22 | End: 2021-09-25 | Stop reason: HOSPADM

## 2021-09-22 RX ORDER — ONDANSETRON 2 MG/ML
4 INJECTION INTRAMUSCULAR; INTRAVENOUS EVERY 6 HOURS PRN
Status: DISCONTINUED | OUTPATIENT
Start: 2021-09-22 | End: 2021-09-25 | Stop reason: HOSPADM

## 2021-09-22 RX ORDER — ACETAMINOPHEN 650 MG/1
650 SUPPOSITORY RECTAL EVERY 6 HOURS PRN
Status: DISCONTINUED | OUTPATIENT
Start: 2021-09-22 | End: 2021-09-25 | Stop reason: HOSPADM

## 2021-09-22 RX ORDER — NALOXONE HYDROCHLORIDE 0.4 MG/ML
0.2 INJECTION, SOLUTION INTRAMUSCULAR; INTRAVENOUS; SUBCUTANEOUS
Status: DISCONTINUED | OUTPATIENT
Start: 2021-09-22 | End: 2021-09-25 | Stop reason: HOSPADM

## 2021-09-22 RX ORDER — FENTANYL CITRATE 50 UG/ML
25-50 INJECTION, SOLUTION INTRAMUSCULAR; INTRAVENOUS EVERY 5 MIN PRN
Status: DISCONTINUED | OUTPATIENT
Start: 2021-09-22 | End: 2021-09-23

## 2021-09-22 RX ORDER — MEPERIDINE HYDROCHLORIDE 25 MG/ML
25 INJECTION INTRAMUSCULAR; INTRAVENOUS; SUBCUTANEOUS ONCE
Status: DISCONTINUED | OUTPATIENT
Start: 2021-09-22 | End: 2021-09-25 | Stop reason: HOSPADM

## 2021-09-22 RX ORDER — LIDOCAINE 40 MG/G
CREAM TOPICAL
Status: DISCONTINUED | OUTPATIENT
Start: 2021-09-22 | End: 2021-09-22

## 2021-09-22 RX ORDER — PROCHLORPERAZINE 25 MG
25 SUPPOSITORY, RECTAL RECTAL EVERY 12 HOURS PRN
Status: DISCONTINUED | OUTPATIENT
Start: 2021-09-22 | End: 2021-09-25 | Stop reason: HOSPADM

## 2021-09-22 RX ORDER — CEFTRIAXONE 2 G/1
2 INJECTION, POWDER, FOR SOLUTION INTRAMUSCULAR; INTRAVENOUS EVERY 24 HOURS
Status: DISCONTINUED | OUTPATIENT
Start: 2021-09-22 | End: 2021-09-25 | Stop reason: ALTCHOICE

## 2021-09-22 RX ORDER — SODIUM CHLORIDE, SODIUM LACTATE, POTASSIUM CHLORIDE, CALCIUM CHLORIDE 600; 310; 30; 20 MG/100ML; MG/100ML; MG/100ML; MG/100ML
INJECTION, SOLUTION INTRAVENOUS CONTINUOUS
Status: DISCONTINUED | OUTPATIENT
Start: 2021-09-22 | End: 2021-09-25

## 2021-09-22 RX ORDER — IOPAMIDOL 755 MG/ML
99 INJECTION, SOLUTION INTRAVASCULAR ONCE
Status: COMPLETED | OUTPATIENT
Start: 2021-09-22 | End: 2021-09-22

## 2021-09-22 RX ORDER — METRONIDAZOLE 500 MG/1
500 TABLET ORAL 2 TIMES DAILY
Status: DISCONTINUED | OUTPATIENT
Start: 2021-09-22 | End: 2021-09-25 | Stop reason: ALTCHOICE

## 2021-09-22 RX ORDER — IBUPROFEN 600 MG/1
600 TABLET, FILM COATED ORAL EVERY 6 HOURS PRN
Status: DISCONTINUED | OUTPATIENT
Start: 2021-09-22 | End: 2021-09-25 | Stop reason: HOSPADM

## 2021-09-22 RX ADMIN — METRONIDAZOLE 500 MG: 500 TABLET ORAL at 08:43

## 2021-09-22 RX ADMIN — APIXABAN 10 MG: 5 TABLET, FILM COATED ORAL at 18:02

## 2021-09-22 RX ADMIN — PIPERACILLIN SODIUM AND TAZOBACTAM SODIUM 3.38 G: 3; .375 INJECTION, POWDER, LYOPHILIZED, FOR SOLUTION INTRAVENOUS at 01:52

## 2021-09-22 RX ADMIN — SODIUM CHLORIDE: 9 INJECTION, SOLUTION INTRAVENOUS at 02:59

## 2021-09-22 RX ADMIN — MIDAZOLAM HYDROCHLORIDE 1 MG: 1 INJECTION, SOLUTION INTRAMUSCULAR; INTRAVENOUS at 13:56

## 2021-09-22 RX ADMIN — SODIUM CHLORIDE, POTASSIUM CHLORIDE, SODIUM LACTATE AND CALCIUM CHLORIDE 1000 ML: 600; 310; 30; 20 INJECTION, SOLUTION INTRAVENOUS at 17:46

## 2021-09-22 RX ADMIN — FENTANYL CITRATE 50 MCG: 50 INJECTION, SOLUTION INTRAMUSCULAR; INTRAVENOUS at 13:45

## 2021-09-22 RX ADMIN — FENTANYL CITRATE 50 MCG: 50 INJECTION, SOLUTION INTRAMUSCULAR; INTRAVENOUS at 13:56

## 2021-09-22 RX ADMIN — SODIUM CHLORIDE, POTASSIUM CHLORIDE, SODIUM LACTATE AND CALCIUM CHLORIDE: 600; 310; 30; 20 INJECTION, SOLUTION INTRAVENOUS at 18:07

## 2021-09-22 RX ADMIN — SODIUM CHLORIDE, POTASSIUM CHLORIDE, SODIUM LACTATE AND CALCIUM CHLORIDE: 600; 310; 30; 20 INJECTION, SOLUTION INTRAVENOUS at 22:15

## 2021-09-22 RX ADMIN — CEFTRIAXONE SODIUM 2 G: 2 INJECTION, POWDER, FOR SOLUTION INTRAMUSCULAR; INTRAVENOUS at 08:43

## 2021-09-22 RX ADMIN — IOPAMIDOL 99 ML: 755 INJECTION, SOLUTION INTRAVENOUS at 00:06

## 2021-09-22 RX ADMIN — ACETAMINOPHEN 650 MG: 325 TABLET, FILM COATED ORAL at 16:20

## 2021-09-22 RX ADMIN — MIDAZOLAM HYDROCHLORIDE 1 MG: 1 INJECTION, SOLUTION INTRAMUSCULAR; INTRAVENOUS at 13:50

## 2021-09-22 RX ADMIN — METRONIDAZOLE 500 MG: 500 TABLET ORAL at 20:25

## 2021-09-22 RX ADMIN — HYDROMORPHONE HYDROCHLORIDE 0.5 MG: 1 INJECTION, SOLUTION INTRAMUSCULAR; INTRAVENOUS; SUBCUTANEOUS at 16:01

## 2021-09-22 RX ADMIN — SODIUM CHLORIDE 95 ML: 9 INJECTION, SOLUTION INTRAVENOUS at 00:07

## 2021-09-22 ASSESSMENT — MIFFLIN-ST. JEOR: SCORE: 1737.95

## 2021-09-22 ASSESSMENT — ACTIVITIES OF DAILY LIVING (ADL)
DOING_ERRANDS_INDEPENDENTLY_DIFFICULTY: NO
ADLS_ACUITY_SCORE: 7

## 2021-09-22 NOTE — PLAN OF CARE
Received the pt from ED @8533. Inpatient status. Contact precaution maintained. A&O x4. VSS on RA. Up independent. Denies pain. NPO except ice chips and meds. IVF NS @100 ml/hr. Colorectal surgery/Infectious disease/ IR consult today. Continue to monitor.

## 2021-09-22 NOTE — PRE-PROCEDURE
GENERAL PRE-PROCEDURE:   Procedure:  Abdominal abscess drain placement with intravenous sedation  Date/Time:  9/22/2021 11:31 AM    Written consent obtained?: Yes    Risks and benefits: Risks, benefits and alternatives were discussed    Consent given by:  Patient  Patient states understanding of procedure being performed: Yes    Patient's understanding of procedure matches consent: Yes    Procedure consent matches procedure scheduled: Yes    Expected level of sedation:  Moderate  Appropriately NPO:  Yes  ASA Class:  3  Mallampati  :  Grade 2- soft palate, base of uvula, tonsillar pillars, and portion of posterior pharyngeal wall visible  Lungs:  Lungs clear with good breath sounds bilaterally  Heart:  Normal heart sounds and rate  History & Physical reviewed:  History and physical reviewed and no updates needed  Statement of review:  I have reviewed the lab findings, diagnostic data, medications, and the plan for sedation

## 2021-09-22 NOTE — PLAN OF CARE
Contact precaution maintained. A&O x4. VSS on RA. Up independent. Denies pain. NPO except ice chips and meds. IVF NS @100 ml/hr.. Drain placed in anterior abdomen. 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

## 2021-09-22 NOTE — ED NOTES
[facility]  ED Nurse Handoff Report    ED Chief complaint: Shortness of Breath      ED Diagnosis:   Final diagnoses:   None       Code Status: Full Code    Allergies: No Known Allergies    Patient Story: patient here with shortness of breath ,fever,chills,diarrhea and abdominal pain. He was seen here 2 days ago and was worked up. He had a negative covid test. To night had increase shortness of breath  Focused Assessment:  Shortness of breat,fever    Treatments and/or interventions provided: labs,CT and motrin, liter NS  Patient's response to treatments and/or interventions: ongoing,fever now 98.1     To be done/followed up on inpatient unit:  .    Does this patient have any cognitive concerns?:     Activity level - Baseline/Home:  Independent  Activity Level - Current:   Independent    Patient's Preferred language: English   Needed?: No    Isolation: None  Infection: Not Applicable  Patient tested for COVID 19 prior to admission: YES  Bariatric?: No    Vital Signs:   Vitals:    09/21/21 2315 09/21/21 2330 09/21/21 2345 09/22/21 0053   BP: 115/64 119/59 114/61    Pulse: 87 83 85    Temp:    98.1  F (36.7  C)   TempSrc:    Oral   SpO2: 96% 96% 97%        Cardiac Rhythm:Cardiac Rhythm: Normal sinus rhythm    Was the PSS-3 completed:   Yes  What interventions are required if any?               Family Comments: wife here with patient  OBS brochure/video discussed/provided to patient/family: No              Name of person given brochure if not patient: .              Relationship to patient: .    For the majority of the shift this patient's behavior was Green.   Behavioral interventions performed were none.    ED NURSE PHONE NUMBER: [phone]

## 2021-09-22 NOTE — PROGRESS NOTES
Patient admitted today morning by my colleague, I visited with him, discussed with IR, plan is to place drain in diverticulitis/abdominal abscess, the liver abscess is small and not amenable for drain placement.  Patient feels that his symptoms are much improved since admission, continue antibiotics, appreciate colorectal surgery input, infectious disease input pending.  His creatinine is improved from 1.5-1.26, continue hydration, n.p.o. for procedure.  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

## 2021-09-22 NOTE — CODE/RAPID RESPONSE
[facility]    RRT Note  9/22/2021   Time Called: 1557    RRT called for: shivering    Assessment & Plan     Rigors  Sepsis secondary to diverticulitis with multiple abscesses s/p drain placement 9/22/2021  Boom Daniel is a 61-year-old male admitted on 9/21/2021 with fever, chills, shortness of breath with findings of an intra-abdominal infection including multiple abscesses of the abdomen, pelvis, and liver.  The patient is status post drain placement in IR.  Patient reports that approximately 1500 he began to note feeling chilled and has now developed rigors.  He states this is similar to his presentation to the hospital.  He is tachycardic and hypertensive currently.  He is being treated for known sepsis and ID is following and managing his antibiotics.  Plan for attempted management.  And assess laboratory studies.  Lactic acid returned at 3.7 will administer IV fluids per sepsis protocol with 30 mL/kg and reassess lactic acid following completion of IV fluids.  Once the patient's fever had spiked his rigors resided and he states that he feels improved.    INTERVENTIONS:  -Rectal temp-->101  -Demerol 25mg IV x1 now  -CMP, CBC, LA, BCx2 STAT  -Tylenol 650mg PO now  -Blood glucose 68-->treated with PO intake  -LR 30ml/kg  -Reassess LA at 2000    At the end of the RRT will remain on current unit    Discussed with and defer further cares to flying squad and bedside nursing    Interval History     Boom Daniel is a 61 year old male who was admitted on 9/21/2021 for sepsis.    Medical history significant for:   No past medical history on file.  No past surgical history on file.    Code Status: Full Code    Allergies   No Known Allergies    Physical Exam   Vital Signs with Ranges:  Temp:  [97.4  F (36.3  C)-103.2  F (39.6  C)] 101  F (38.3  C)  Pulse:  [] 110  Resp:  [16-22] 22  BP: ()/(50-89) 173/89  SpO2:  [94 %-99 %] 99 %  No intake/output data recorded.    Constitutional:  alert, cooperative, shaking uncontrollably  ENT: mucus membranes moist, EOM intact   Neck: supple  Pulmonary: clear, respiration elevated but no increased work of breathing   Cardiovascular: S1, S2, tachycardic  GI: rounded, drain in place, no pain on palpation  Skin/Integumen: mottling appearance throughout  Neuro: alert, oriented x4, no focal deficits  Psych:  calm  Extremities: no peripheral edema    Data     ABG:  -No lab results found in last 7 days.    Troponin:    Recent Labs   Lab Test 09/21/21  2143   TROPONIN <0.015       IMAGING: (X-ray/CT/MRI)   Recent Results (from the past 24 hour(s))   CT Chest (PE) Abdomen Pelvis w Contrast    Narrative    EXAM: CT CHEST PE ABDOMEN PELVIS W CONTRAST  LOCATION: [facility]  DATE/TIME: 9/22/2021 12:26 AM    INDICATION: Shortness of breath and cough. Fever, diarrhea, abdominal pain.  COMPARISON: None.  TECHNIQUE: CT chest pulmonary angiogram and routine CT abdomen pelvis with IV contrast. Arterial phase through the chest and venous phase through the abdomen and pelvis. Multiplanar reformats and MIP reconstructions were performed. Dose reduction   techniques were used.   CONTRAST: 99 mL [id number]    FINDINGS:  ANGIOGRAM CHEST: Pulmonary arteries are normal caliber and negative for pulmonary emboli. Thoracic aorta is negative for dissection. No CT evidence of right heart strain.     LUNGS AND PLEURA: No acute infiltrates or consolidation. Mild dependent atelectasis in the lower lungs. Mild paraseptal emphysematous changes in the upper lungs. Indeterminate 4 mm nodule right middle lobe on series 6 image 150. No pleural effusions.    MEDIASTINUM/AXILLAE: No adenopathy. Normal heart size. No pericardial effusion. Normal caliber thoracic aorta. Small hiatal hernia. Axillary regions are unremarkable.    CORONARY ARTERY CALCIFICATION: None.    HEPATOBILIARY: There is an indeterminate heterogeneous low-attenuation lesion with ill-defined borders in the  right hepatic lobe posteriorly measuring approximately 4.8 x 4.7 x 3.9 cm. Given the findings described below, this is concerning for potential   developing phlegmonous reaction/abscess within the liver. There is an adjacent small tubular shaped low-density structure seen adjacent to this process which could represent a partially thrombosed hepatic vein. Additional indeterminate 1.3 cm   low-attenuation lesion in the left hepatic lobe superiorly on image 21. No calcified gallstones or biliary dilatation.    PANCREAS: Normal.    SPLEEN: Normal.    ADRENAL GLANDS: Normal.    KIDNEYS/BLADDER: Normal.    BOWEL: Sigmoid diverticulosis. There appears to be some mild low-density thickening in the wall of the sigmoid colon. Immediately anterior to the sigmoid colon in the lower pelvis anteriorly, there is a complex peripherally enhancing fluid collection   with mildly thickened walls measuring up to 5.0 cm in diameter. This is seen situated just superior to the bladder and is most suggestive of an abscess collection. Additional 3.5 cm less well-circumscribed fluid collection adjacent to the sigmoid colon   on image 154 may represent an additional developing abscess. Findings would be most suggestive of diverticulitis with peridiverticular abscesses. No free air and no evidence for bowel obstruction. Appendix is normal. Close imaging follow-up is   recommended to monitor for resolution and exclude any underlying lesions.    LYMPH NODES: Normal.    VASCULATURE: Aortic calcification without aneurysm.    PELVIC ORGANS: Normal.    MUSCULOSKELETAL: No significant bony findings. Benign-appearing area of sclerosis in the left supra-acetabular region likely represents a benign bone island. Degenerative changes both hips.      Impression    IMPRESSION:  1.  Negative for pulmonary embolism. No acute findings in the chest.    2.  Technically indeterminate 4 mm pulmonary nodule in the right middle lobe. Recommend follow-up CT in one  year for reevaluation.    3.  Mild paraseptal emphysematous changes in the upper lungs.    4.  Sigmoid diverticulosis with mild wall thickening. Complex fluid collections are seen in the lower pelvis anteriorly situated between the sigmoid colon and superior aspect of the bladder with the largest collection measuring up to 5.0 cm. These are   concerning for abscesses and constellation of findings is most suggestive of diverticulitis with peridiverticular abscesses. Clinical correlation. Close imaging follow-up is recommended to monitor for resolution of these findings and exclude any   underlying lesions.    5.  Indeterminate heterogeneous low-attenuation lesion with ill-defined margins in the right hepatic lobe measuring up to 4.8 cm in diameter. This lesion is indeterminate but concerning for an area of phlegmonous change and potential developing abscess   within the liver. Additional indeterminate 1.3 cm low-attenuation lesion left hepatic lobe. MRI could be considered in further evaluation. Otherwise close follow-up in the liver is recommended to exclude any underlying lesion or progressive process.    6.  Small tubular shaped low-density structure seen in the right hepatic lobe is suggestive of a partially thrombosed intrahepatic vein.    Findings called to Dr. Astudillo 9/22/2021 12:50 AM.   CT Abdomen Peritonium Abscess Drainage    Narrative    CT ABDOMEN PERITONEUM ABSCESS DRAIN WITH CATHETER PLACE  9/22/2021  2:34 PM     HISTORY: Liver abscess, multiple colon abscesses. Percutaneous  drainage requested by primary service.    COMPARISON: None.     TECHNIQUE: Volumetric helical acquisition of CT images were acquired  without contrast. Radiation dose for this scan was reduced using  automated exposure control, adjustment of the mA and/or kV according  to patient size, or iterative reconstruction technique.    MEDICATIONS:  2mg versed, 100mcg fentanyl given by RORY Manrique;  10ml  lidocaine given by   ford    FINDINGS: After written and oral informed consent was obtained, and  pause for the cause correctly identified the patient and procedure,  the patient was scanned in supine position for procedural planning.  The subcutaneous tissues overlying the fluid collection in the pelvis  was identified, marked, prepped and draped in the usual sterile  fashion, and anesthetized with 10 mL of 1% subcutaneous lidocaine. The  fluid collection was accessed with a hollow bore needle, and purulent  material was aspirated. An 0.035 floppy tipped wire was advanced into  the fluid collection and the needle was removed over the wire. The  tract was serially dilated.  A 10 [place]amanian locking pigtail catheter was  advanced into the abscess and its position was verified with  postprocedural imaging. The catheter was fixed to the overlying skin  using an adhesive bandage. There were no immediate complications and  patient left the CT suite in satisfactory condition.     CONSCIOUS SEDATION NOTE: After obtaining consent for sedation,  conscious sedation was induced using IV Versed and IV fentanyl.  Patient was monitored by nurse under my direct supervision throughout  the exam. There were no complications of the sedation. 15 minutes  face-to-face time.      Impression    IMPRESSION:   1. Technically successful CT guided abscess drain placement.   2. Conscious sedation.       CBC with Diff:  Recent Labs   Lab Test 09/22/21  1002 09/21/21  2143   WBC  --  5.2   HGB  --  13.6   MCV  --  84   PLT  --  261   INR 1.23*  --         Lactic Acid:    No results found for: LACT        Comprehensive Metabolic Panel:  Recent Labs   Lab 09/22/21  1002 09/21/21  2143 09/21/21  2143      < > 131*   POTASSIUM 4.1   < > 3.8   CHLORIDE 111*   < > 100   CO2 20   < > 19*   ANIONGAP 9   < > 12   GLC 95   < > 105*   BUN 16   < > 17   CR 1.26*   < > 1.53*   GFRESTIMATED 61   < > 48*   BEREKET 7.9*   < > 8.4*   PROTTOTAL  --   --  7.5   ALBUMIN  --   --  2.7*    BILITOTAL  --   --  0.7   ALKPHOS  --   --  148   AST  --   --  35   ALT  --   --  53    < > = values in this interval not displayed.       INR:    Recent Labs   Lab Test 09/22/21  1002   INR 1.23*       D-DIMER:  No results found for: DIMER    BNP:  No results found for: BNP    UA:  Recent Labs   Lab 09/21/21  2220   COLOR Yellow   APPEARANCE Clear   URINEGLC Negative   URINEBILI Negative   URINEKETONE Negative   SG 1.022   UBLD Moderate*   URINEPH 5.5   PROTEIN 30 *   NITRITE Negative   LEUKEST Negative   RBCU 10*   WBCU 2         Time Spent on this Encounter   I spent 30 minutes of critical care time on the unit/floor managing the care of Boom Daniel. Upon evaluation, this patient had a high probability of imminent or life-threatening deterioration due to sepsis, which required my direct attention, intervention, and personal management. 100% of my time was spent at the bedside counseling the patient and/or coordinating care regarding services listed in this note.    Ciera Henson, APRN CNP

## 2021-09-22 NOTE — PLAN OF CARE
A&Ox4. T 103.2F, HTN, on RA. SBA. CLD. IVF infusing. Wife at bedside. Pt experienced shaking, tachycardia, elevated BP after ambulating to bathroom, fingers/toes turning white and cool, RRT called. Labs sent. BC pending, Lactic 3.7, LR bolus given, MAP 73, LR started @ 150mL/hr according to sepsis protocol. Pt feeling better, T still elevated, improving. Drain in place, to bulb suction, bright red bloody drainage, irrigated x1, abd site CDI. Flagyl and Rocephin ordered. IR, ID, Vascular, Colorectal surg following.

## 2021-09-22 NOTE — ED PROVIDER NOTES
History   Chief Complaint:  Fever    HPI   Boom Daniel is a 61 year old male who presents with shortness of breath. The patient stated that he has been having mild diarrhea, cough and fever with chills for 2 days. He has had a fever of 103F this afternoon. He also stated some mild lower abdomen discomfort as well as some urinary urgency. The patient has had a workup done yesterday in the ED and a covid test which was negative, he is fully vaccinated against covid.  Discharged on doxycycline for possible atypical pneumonia.  He denies blood in stool, back or neck pain, rectal or urinary pains, any abdominal surgeries or any sick contacts. He also denies any STD concerns, known thick exposure, rashes or other skin changes.     Review of Systems   Constitutional: Positive for fever.   Respiratory: Positive for cough and shortness of breath (resolved).    Gastrointestinal: Negative for blood in stool.   Genitourinary: Positive for urgency. Negative for difficulty urinating.   Musculoskeletal: Negative for back pain and neck pain.   Skin: Negative for rash.   All other systems reviewed and are negative.    Allergies:  The patient has no known allergies.     Medications:  Vibramycin    Past Medical History:    No past medical history on file.     Past Surgical History:    No past surgical history on file.     Family History:    No family history on file.    Social History:  The patient was brought to the emergency department by private vehicle.  The patient presents to the emergency department with his wife.    Physical Exam     Patient Vitals for the past 24 hrs:   BP Temp Temp src Pulse SpO2   09/21/21 2330 119/59 -- -- 83 96 %   09/21/21 2315 115/64 -- -- 87 96 %   09/21/21 2300 105/63 -- -- 92 94 %   09/21/21 2230 102/63 -- -- 96 94 %   09/21/21 2145 101/68 -- -- 108 --   09/21/21 2131 138/69 (!) 103.2  F (39.6  C) Oral (!) 135 99 %     Physical Exam  General: Alert and cooperative with exam. Patient in mild  distress. Ill but nontoxic appearance. Normal mentation.  Head:  Scalp is NC/AT  Eyes:  No scleral icterus, PERRL  ENT:  The external nose and ears are normal. The oropharynx is normal and without erythema; mucus membranes are moist. Uvula midline, no evidence of deep space infection.  Neck:  Normal range of motion without rigidity.  CV:  Tachycardic rate and rhythm.  Resp:  Breath sounds are clear bilaterally    Non-labored, no retractions or accessory muscle use  GI:  Abdomen is soft, no distension, no tenderness. Abdomen mildly distended. No peritoneal signs  MS:  No lower extremity edema.  Skin:  Warm and clammy, No rash or lesions noted.  Neuro: Oriented x 3. No gross motor deficits.    Emergency Department Course     ECG:  ECG taken at 2123, ECG read at 2216  Sinus tachycardia   Nonspecific ST abnormality  Abnormal ECG  Rate 124 bpm. GA interval 130 ms. QRS duration 82 ms. QT/QTc 352/505 ms. P-R-T axes -13 39 40.    Imaging:  CT Chest (PE) Abdomen Pelvis w Contrast   Final Result   IMPRESSION:   1.  Negative for pulmonary embolism. No acute findings in the chest.      2.  Technically indeterminate 4 mm pulmonary nodule in the right middle lobe. Recommend follow-up CT in one year for reevaluation.      3.  Mild paraseptal emphysematous changes in the upper lungs.      4.  Sigmoid diverticulosis with mild wall thickening. Complex fluid collections are seen in the lower pelvis anteriorly situated between the sigmoid colon and superior aspect of the bladder with the largest collection measuring up to 5.0 cm. These are    concerning for abscesses and constellation of findings is most suggestive of diverticulitis with peridiverticular abscesses. Clinical correlation. Close imaging follow-up is recommended to monitor for resolution of these findings and exclude any    underlying lesions.      5.  Indeterminate heterogeneous low-attenuation lesion with ill-defined margins in the right hepatic lobe measuring up to 4.8  cm in diameter. This lesion is indeterminate but concerning for an area of phlegmonous change and potential developing abscess    within the liver. Additional indeterminate 1.3 cm low-attenuation lesion left hepatic lobe. MRI could be considered in further evaluation. Otherwise close follow-up in the liver is recommended to exclude any underlying lesion or progressive process.      6.  Small tubular shaped low-density structure seen in the right hepatic lobe is suggestive of a partially thrombosed intrahepatic vein.      Findings called to Dr. Astudillo 9/22/2021 12:50 AM.        Laboratory:  CBC: WBC 5.2, HGB 13.6,      CMP: Glucose 105 (H), Sodium: 131 (L), Carbon Dioxide: 19 (L), Calcium: 8.4 (L), Albumin: 2.7 (L) o/w WNL (Creatinine: 1.53)    Troponin (Collected 2143): <0.015    D-dimer: 2.84 (H)     Blood Cultures x2: Pending    Symptomatic COVID-19 PCR: Negative    UA with micro: Blood: moderate (A), Hyaine cast: 3 (H), Mucus: present (A), RBC: 10 (H) o/w negative    Emergency Department Course:    Reviewed:  I reviewed nursing notes, vitals, past medical history and care everywhere    Assessments/Consults  2130 I obtained history and examined the patient as noted above.     Interventions:  2217 Advil 600 mg oral  2301 NS 1000 mL IV  Zosyn    Disposition:  The patient was admitted to the hospital under the care of Dr. Alma Delia Blake      Impression & Plan     Medical Decision Making:  Boom Daniel is a 61 year old male who presents for evaluation of fever.  A broad differential diagnosis was considered.  No significant abdominal tenderness on exam.  Labs were notable for resolved white count and elevated D-dimer.  CT PE study and abdomen pelvis was obtained given the high fever with no clear source on exam or labs.  Imaging demonstrates multiple intra-abdominal abscesses as noted above as well as concern for developing liver abscess.  Incidental pulmonary nodule noted and discussed with patient.  Patient's  fever and tachycardia resolved with ibuprofen.  He additionally received IV fluids and remained hemodynamically stable.  Blood cultures were obtained and patient was provided Zosyn.  Patient will be admitted to the hospitalist service for further evaluation and care.  At this time no indication for emergent IR/colorectal surgical consultation from the ED; will defer to hospitalist service.    Covid-19  Boom Daniel was evaluated during a global COVID-19 pandemic, which necessitated consideration that the patient might be at risk for infection with the SARS-CoV-2 virus that causes COVID-19.   Applicable protocols for evaluation were followed during the patient's care.   COVID-19 was considered as part of the patient's evaluation. The plan for testing is:  a test was obtained during this visit.    Diagnosis:    ICD-10-CM    1. Diverticulitis of large intestine with abscess, unspecified bleeding status  K57.20    2. Fever, unspecified fever cause  R50.9    3. Pulmonary nodule  R91.1        Scribe Disclosure:  Romeo ABBOTT, am serving as a scribe at 9:30 PM on 9/21/2021 to document services personally performed by Jimmy Astudillo DO based on my observations and the provider's statements to me.          Jimmy Astudillo DO  09/22/21 0218

## 2021-09-22 NOTE — CONSULTS
[facility]    Vascular Medicine Consultation     Attestation: I have examined the patient independently of Lovely Kennedy PA-C and agree with the examination and plan as delineated below.    Jimmy Rausch MD      Date of Admission:  9/21/2021  Date of Consult (When I saw the patient): 09/22/21    Assessment & Plan   Perforated sigmoid diverticulitis with neena-diverticular abscess  Developing right hepatic lobe abscess  Likely partially thrombosed intrahepatic vein in the right hepatic lobe    This patient's course is complicated and deserves to be inpatient status admission. He went to IR for abdominal abscess drain placement today. ID is following and has him on antibiotics. D.dimer was elevated, but this is not helpful in the setting of abscesses and diverticulitis. There is not a high risk of extension of thrombus in the intrahepatic vein currently. Considering he just had a drain placed into his abdomen, will hold off on initiating any anticoagulation at present time. However, this thrombus will eventually need to be treated. When no further procedures are planned and the patient is clear for discharge, it is recommended that he be placed on Xarelto 15 mg BID for 3 weeks followed by 20 mg daily thereafter. In 3 months, we would then want to have him get a CT venogram of the abdomen as well as a d.dimer and have him follow-up in our clinic to discuss possible cessation of anticoagulation.       Reason for Consult   Reason for consult: Asked by Dr. Childress to evaluate and provide recommendations regarding anticoagulation in this 61 year old male presenting with diverticulitis with abdominal abscess and developing right hepatic lobe abscess also noted to have findings suggestive of partially thrombosed intrahepatic vein in the right hepatic lobe.    Primary Care Physician   [facility]      History of Present Illness   Boom Daniel is a 61 year old male former  smoker with CKD, gout and sleep apnea who developed abdominal pain and non bloody diarrhea with anorexia 9/18. He also had a progressive cough. On 9/20 he developed a fever and was seen in the ED. Abdominal exam was benign and he was prescribed oral Doxycycline for possible atypical pneumonia. His Covid returned negative and he has been fully vaccinated.      He returned for evaluation on 9/21 with a fever of 103 degrees and leukocytosis. CT of the abdomen was performed revealing perforated sigmoid diverticulitis with fluid collection between the sigmoid colon and bladder related to peridiverticular abscess. A lesion was also seen in the right hepatic lobe concerning for an evolving abscess. He was initiated on Zosyn and ID was consulted. Colorectal has also seen the patient and has no plans for surgical intervention. However, IR is planning on placing a drain in abdominal abscess. Upon further review of imaging, it was incidentally noted that he also has a small tubular low-density structure seen in the right hepatic lobe suggestive of a partially thrombosed intrahepatic vein. We were consulted to determine the need for anticoagulation.     His abdominal pain has improved. Had a normal bowel movement last night. Fevers have resolved. Drain was placed today with small amount of bloody output. Appetite is good.     Past Medical History   Dyslipidemia  Gout  CKD stage 2-3    Past Surgical History   No past surgical history on file.    Prior to Admission Medications   Prior to Admission Medications   Prescriptions Last Dose Informant Patient Reported? Taking?   doxycycline hyclate (VIBRAMYCIN) 100 MG capsule 9/21/2021 at Unknown time  No Yes   Sig: Take 1 capsule (100 mg) by mouth 2 times daily   losartan (COZAAR) 50 MG tablet Unknown at Unknown time  Yes Yes   Sig: Take 50 mg by mouth daily      Facility-Administered Medications: None     Allergies   No Known Allergies    Social History   Boom Daniel    is a former  smoker. Denies illicit drug abuse. Drinks alcohol most days of the week and more heavily on weekends. Retired from [employer] industry.     Family History   No family history on file.    Review of Systems   The 10 point Review of Systems is negative other than noted in the HPI or here.     Physical Exam   Temp: 97.6  F (36.4  C) Temp src: Oral BP: 113/66 Pulse: 66   Resp: 16 SpO2: 97 % O2 Device: None (Room air) Oxygen Delivery: 1 LPM  Vital Signs with Ranges  Temp:  [97.4  F (36.3  C)-103.2  F (39.6  C)] 97.6  F (36.4  C)  Pulse:  [] 66  Resp:  [16-18] 16  BP: ()/(50-71) 113/66  SpO2:  [94 %-99 %] 97 %  200 lbs 12.8 oz    Constitutional: awake, alert, cooperative, no apparent distress, and appears stated age  Eyes: Lids and lashes normal, pupils equal, round and reactive to light, extra ocular muscles intact, sclera clear, conjunctiva normal  ENT: normocepalic, without obvious abnormality, oropharynx pink and moist  Hematologic / Lymphatic: no lymphadenopathy  Respiratory: No increased work of breathing, good air exchange, clear to auscultation bilaterally, no crackles or wheezing  Cardiovascular: regular rate and rhythm, normal S1 and S2 and no murmur noted  GI: Normal bowel sounds, soft, non-distended, non-tender, HAYDEE drain with bloody output.   Skin: no redness, warmth, or swelling, no rashes and no lesions  Musculoskeletal: There is no redness, warmth, or swelling of the joints.  Full range of motion noted.  Motor strength is 5 out of 5 all extremities bilaterally.  Tone is normal.  Neurologic: Awake, alert, oriented to name, place and time.  Cranial nerves II-XII are grossly intact.  Motor is 5 out of 5 bilaterally.    Neuropsychiatric:  Normal affect, memory, insight.      Data   Most Recent 3 CBC's:  Recent Labs   Lab Test 09/21/21 2143 09/20/21  1239   WBC 5.2 15.2*   HGB 13.6 13.4   MCV 84 85    271     Most Recent 3 BMP's:  Recent Labs   Lab Test 09/22/21  1002 09/21/21 2143  09/20/21  1239    131* 131*   POTASSIUM 4.1 3.8 4.0   CHLORIDE 111* 100 102   CO2 20 19* 21   BUN 16 17 18   CR 1.26* 1.53* 1.53*   ANIONGAP 9 12 8   BEREKET 7.9* 8.4* 8.7   GLC 95 105* 119*     Most Recent 3 INR's:  Recent Labs   Lab Test 09/22/21  1002   INR 1.23*     Most Recent Cholesterol Panel:No lab results found.  Most Recent Hemoglobin A1c:No lab results found.

## 2021-09-22 NOTE — PROGRESS NOTES
RECEIVING UNIT ED HANDOFF REVIEW    ED Nurse Handoff Report was reviewed by: Samir Herrera RN on September 22, 2021 at 2:07 AM

## 2021-09-22 NOTE — CONSULTS
[facility]    Infectious Disease Consultation     Date of Admission:  9/21/2021  Date of Consult : 09/22/21    Assessment:  1. Perforated sigmoid diverticulitis with neena diverticular abscess between sigmoid colon and bladder  2. Right hepatic lobe phlegmon with developing abscess. Additional lesion noted in the left hepatic lobe which will need to be followed  3. Fever/ leukocytosis improving  4. Incidental note of pulmonary nodule which will need follow up.  5. Tobacco abuse hitory and pulmonary emphysema  6. CKD with current creatinine of 1.53    Recommendations:  1. IR evaluation to assess for feasibility of drainage of abdominal abscess and cultures  2. Colorectal surgical evaluation  3. Follow blood cxs  4. Will need follow up imaging at a later date (liver MRI) to assess evolution of right hepatic phlegmon and left hepatic lobe lesion, and drainage may be required if it forms an abscess  5. Discontinue Zosyn. Treat with Ceftriaxone  / Metronidazole  Follow clinical status and labs    Marlen Barnes MD    Reason for Consult    I was asked to evaluate this patient for antimicrobial recommendations for perforated diverticulitis with abscess.    Primary Care Physician   [facility]    Chief Complaint   Abdominal pain and fever    History is obtained from the patient and medical records    History of Present Illness   Boom Daniel is a 61 year old male with CKD and sleep apnea who has had complaints of abdominal pain and non bloody diarrhea with anorexia since 9/18 and progressive cough. He developed a fever on 9/20 and was seen in the ED, abdominal exam was benign and he was prescribed oral Doxycycline for possible atypical pneumonia. Covid -, fully vaccinated.     He returned for evaluation yesterday with a fever of 103 degrees and leukocytosis of 15K. Subsequently CT abdomen was performed which was significant for perforated sigmoid diverticulitis with fluid collection  between the sigmoid colon and bladder related to peridiverticular abscess. A lesion was also seen in the right hepatic lobe concerning for an evolving abscess. Patient was initiated on Zosyn and ID has been asked to assist with antibiotic management.    Patient has noted night sweats and chills. This morning feels a little better, fever has resolved. No abdominal pain or discomfort    Antimicrobial therapy  9/22 Zosyn    Past Medical History   I have reviewed this patient's medical history and updated it with pertinent information if needed.   No past medical history on file.    Past Surgical History   I have reviewed this patient's surgical history and updated it with pertinent information if needed.  No past surgical history on file.    Prior to Admission Medications   Prior to Admission Medications   Prescriptions Last Dose Informant Patient Reported? Taking?   doxycycline hyclate (VIBRAMYCIN) 100 MG capsule   No No   Sig: Take 1 capsule (100 mg) by mouth 2 times daily      Facility-Administered Medications: None     Allergies   No Known Allergies    Immunization History     There is no immunization history on file for this patient.    Social History   I have reviewed this patient's social history and updated it with pertinent information if needed. Boom Daniel      Family History   I have reviewed this patient's family history and updated it with pertinent information if needed.   No family history on file.    Review of Systems   The 10 point Review of Systems is negative other than noted in the HPI or here.     Physical Exam   Temp: 97.4  F (36.3  C) Temp src: Oral BP: 101/63 Pulse: 62   Resp: 18 SpO2: 98 % O2 Device: None (Room air)    Vital Signs with Ranges  Temp:  [97.4  F (36.3  C)-103.2  F (39.6  C)] 97.4  F (36.3  C)  Pulse:  [] 62  Resp:  [16-18] 18  BP: ()/(56-71) 101/63  SpO2:  [94 %-99 %] 98 %  200 lbs 12.8 oz  Body mass index is 28.01 kg/m .    GENERAL APPEARANCE:  awake  EYES: Eyes  grossly normal to inspection, PERRL and conjunctivae and sclerae normal  NECK: symmetric  RESP: lungs clear to auscultation - no rales, rhonchi or wheezes  CV: regular rates and rhythm, normal S1 S2, no S3 or S4 and no murmur, click or rub  ABDOMEN: soft, nontender, without hepatosplenomegaly or masses and bowel sounds normal  MS: extremities normal- no gross deformities noted  SKIN: no suspicious lesions or rashes      Data   All laboratory data reviewed  Component      Latest Ref Rng & Units 9/21/2021   WBC      4.0 - 11.0 10e3/uL 5.2   RBC Count      4.40 - 5.90 10e6/uL 4.85   Hemoglobin      13.3 - 17.7 g/dL 13.6   Hematocrit      40.0 - 53.0 % 40.8   MCV      78 - 100 fL 84   MCH      26.5 - 33.0 pg 28.0   MCHC      31.5 - 36.5 g/dL 33.3   RDW      10.0 - 15.0 % 12.8   Platelet Count      150 - 450 10e3/uL 261     Component      Latest Ref Rng & Units 9/21/2021   Sodium      133 - 144 mmol/L 131 (L)   Potassium      3.4 - 5.3 mmol/L 3.8   Chloride      94 - 109 mmol/L 100   Carbon Dioxide      20 - 32 mmol/L 19 (L)   Anion Gap      3 - 14 mmol/L 12   Urea Nitrogen      7 - 30 mg/dL 17   Creatinine      0.66 - 1.25 mg/dL 1.53 (H)   Calcium      8.5 - 10.1 mg/dL 8.4 (L)   Glucose      70 - 99 mg/dL 105 (H)   Alkaline Phosphatase      40 - 150 U/L 148   AST      0 - 45 U/L 35   ALT      0 - 70 U/L 53   Protein Total      6.8 - 8.8 g/dL 7.5   Albumin      3.4 - 5.0 g/dL 2.7 (L)   Bilirubin Total      0.2 - 1.3 mg/dL 0.7   GFR Estimate      >60 mL/min/1.73m2 48 (L)     Micro  Blood cx 9/21 one set and 9/22 one set pending    imaging  9/22 CT chest abdomen pelvis  EXAM: CT CHEST PE ABDOMEN PELVIS W CONTRAST  LOCATION: [facility]  DATE/TIME: 9/22/2021 12:26 AM     INDICATION: Shortness of breath and cough. Fever, diarrhea, abdominal pain.  COMPARISON: None.  TECHNIQUE: CT chest pulmonary angiogram and routine CT abdomen pelvis with IV contrast. Arterial phase through the chest and venous  phase through the abdomen and pelvis. Multiplanar reformats and MIP reconstructions were performed. Dose reduction   techniques were used.   CONTRAST: 99 mL [id number]     FINDINGS:  ANGIOGRAM CHEST: Pulmonary arteries are normal caliber and negative for pulmonary emboli. Thoracic aorta is negative for dissection. No CT evidence of right heart strain.      LUNGS AND PLEURA: No acute infiltrates or consolidation. Mild dependent atelectasis in the lower lungs. Mild paraseptal emphysematous changes in the upper lungs. Indeterminate 4 mm nodule right middle lobe on series 6 image 150. No pleural effusions.     MEDIASTINUM/AXILLAE: No adenopathy. Normal heart size. No pericardial effusion. Normal caliber thoracic aorta. Small hiatal hernia. Axillary regions are unremarkable.     CORONARY ARTERY CALCIFICATION: None.     HEPATOBILIARY: There is an indeterminate heterogeneous low-attenuation lesion with ill-defined borders in the right hepatic lobe posteriorly measuring approximately 4.8 x 4.7 x 3.9 cm. Given the findings described below, this is concerning for potential   developing phlegmonous reaction/abscess within the liver. There is an adjacent small tubular shaped low-density structure seen adjacent to this process which could represent a partially thrombosed hepatic vein. Additional indeterminate 1.3 cm   low-attenuation lesion in the left hepatic lobe superiorly on image 21. No calcified gallstones or biliary dilatation.     PANCREAS: Normal.     SPLEEN: Normal.     ADRENAL GLANDS: Normal.     KIDNEYS/BLADDER: Normal.     BOWEL: Sigmoid diverticulosis. There appears to be some mild low-density thickening in the wall of the sigmoid colon. Immediately anterior to the sigmoid colon in the lower pelvis anteriorly, there is a complex peripherally enhancing fluid collection   with mildly thickened walls measuring up to 5.0 cm in diameter. This is seen situated just superior to the bladder and is most suggestive of an  abscess collection. Additional 3.5 cm less well-circumscribed fluid collection adjacent to the sigmoid colon   on image 154 may represent an additional developing abscess. Findings would be most suggestive of diverticulitis with peridiverticular abscesses. No free air and no evidence for bowel obstruction. Appendix is normal. Close imaging follow-up is   recommended to monitor for resolution and exclude any underlying lesions.     LYMPH NODES: Normal.     VASCULATURE: Aortic calcification without aneurysm.     PELVIC ORGANS: Normal.     MUSCULOSKELETAL: No significant bony findings. Benign-appearing area of sclerosis in the left supra-acetabular region likely represents a benign bone island. Degenerative changes both hips.                                                                      IMPRESSION:  1.  Negative for pulmonary embolism. No acute findings in the chest.     2.  Technically indeterminate 4 mm pulmonary nodule in the right middle lobe. Recommend follow-up CT in one year for reevaluation.     3.  Mild paraseptal emphysematous changes in the upper lungs.     4.  Sigmoid diverticulosis with mild wall thickening. Complex fluid collections are seen in the lower pelvis anteriorly situated between the sigmoid colon and superior aspect of the bladder with the largest collection measuring up to 5.0 cm. These are   concerning for abscesses and constellation of findings is most suggestive of diverticulitis with peridiverticular abscesses. Clinical correlation. Close imaging follow-up is recommended to monitor for resolution of these findings and exclude any   underlying lesions.     5.  Indeterminate heterogeneous low-attenuation lesion with ill-defined margins in the right hepatic lobe measuring up to 4.8 cm in diameter. This lesion is indeterminate but concerning for an area of phlegmonous change and potential developing abscess   within the liver. Additional indeterminate 1.3 cm low-attenuation lesion left  hepatic lobe. MRI could be considered in further evaluation. Otherwise close follow-up in the liver is recommended to exclude any underlying lesion or progressive process.     6.  Small tubular shaped low-density structure seen in the right hepatic lobe is suggestive of a partially thrombosed intrahepatic vein.

## 2021-09-22 NOTE — CONSULTS
"BRIEF NUTRITION ASSESSMENT      REASON FOR ASSESSMENT:  Boom Daniel is a 61 year old male seen by Registered Dietitian for Admission Nutrition Risk Screen:  Have you recently lost weight without trying? \"14-23#\"  Have you been eating poorly because of a decreased appetite? \"yes\"      CURRENT DIET AND INTAKE:  Diet:  NPO              Visited with pt this morning  Tells me that the past few days he has been eating less 2' to \"abdominal issues\"  Notes that he typically follows a regular diet and is a good eater  States that ~3 months ago, he started to make healthier diet changes and decreased his portion sizes - \"was able to lose ~25#\"    ANTHROPOMETRICS:  Height: 5' 11\"  Weight:  200 lbs 12.8 oz  Body mass index is 28.01 kg/m .   Weight Status: Overweight BMI 25-29.9  IBW:  78.2 kg  %IBW: 118%  Weight History: Pt reports intentional 25# wt loss past 3 months    LABS:  Labs noted    MALNUTRITION:  Patient does not meet two of the following criteria necessary for diagnosing malnutrition.     % Weight Loss:  Weight loss does not meet criteria for malnutrition  - wt loss was intentional  % Intake:  Decreased intake does not meet criteria for malnutrition  - decreased po past few days  Subcutaneous Fat Loss:  None observed  Muscle Loss:  None observed  Fluid Retention:  None noted    NUTRITION INTERVENTION:  Nutrition Diagnosis:  No nutrition diagnosis at this time.    Implementation:  Nutrition Education:  Reviewed NPO diet order and meal ordering process for when diet advanced      FOLLOW UP/MONITORING:   Will re-evaluate in 7 - 10 days, or sooner, if re-consulted.          "

## 2021-09-22 NOTE — CONSULTS
"[facility]  Colon and Rectal Surgery Consult Note  Name: Boom Daniel    MRN: [id number]  YOB: 1960    Age: 61 year old  Date of admission: 9/21/2021  Primary care provider: Medicine, [facility]     Requesting Physician:  Dr. Blake  Reason for consult:  Diverticulitis with abscess           History of Present Illness:   Boom Daniel is a 61 year old male, seen at the request of Dr. Blake, who presents with fevers, chills and SOB.  Patient has a past medical history of CKD stage II, dyslipidemia, JARED on CPAP machine, and tobacco use history, tobacco cessation 1.5 years ago.  He initially presented to [facility] ER on 9/20/2021 for fever and shortness of breath.  He was noted to have an elevated white blood count, he was not hypoxic and his chest x-ray was normal.  He tested negative for Covid.  He is fully vaccinated for COVID-19.  He was discharged from the emergency department with oral doxycycline to treat presumed pneumonia.  Upon returning home, he continued to feel poorly and developed fever and rigors.  He had subjective fevers measured at home of 102, and states he just felt \"miserable.\"  He therefore returned to the emergency department yesterday evening and upon presentation he was febrile to 103.2, heart rate of 108, blood pressure 101/68.  His white count had since normalized from the day prior and was found to be 5.  A CT of the chest abdomen pelvis was obtained in the emergency department for further evaluation, and this showed sigmoid diverticulosis with mild wall thickening.  There were multiple complex fluid collections seen in the lower pelvis between the sigmoid colon and anterior aspect of the bladder, with the largest collection measuring up to 5.0 cm.  There is also a indeterminant lesion, concerning for developing abscess measuring up to 4.8 cm in the right hepatic lobe.  He was admitted to the hospitalist service for further evaluation and " "treatment by colorectal surgery and interventional radiology.    Currently the patient is resting in bed.  He states he overall feels much better and has not had any further fevers as prior to admission.  He denies any abdominal.  He continues to pass flatus regularly and have regular bowel movements.  He had a bowel movement this morning.  He denies any blood in the stool.  He does note a weight loss of approximately 20 to 25 pounds, however this was intentional over the past few months.  He denies any change in bowel habits.        Colonoscopy History: He believes he had a colonoscopy between 5 and 10 years ago and that this was normal.  He does not recall the exact date of colonoscopy.  He believes this was done at [facility], however I am unable to find the records in care everywhere.    Past abdominal surgery: none            Past Medical History:   No past medical history on file.          Past Surgical History:   No past surgical history on file.            Social History:     Social History     Tobacco Use     Smoking status: Not on file   Substance Use Topics     Alcohol use: Not on file             Family History:   No family history on file.          Allergies:   No Known Allergies          Medications:       cefTRIAXone  2 g Intravenous Q24H     metroNIDAZOLE  500 mg Oral BID     sodium chloride (PF)  3 mL Intracatheter Q8H             Review of Systems:   A comprehensive greater than 10 system review of systems was carried out.  Pertinent positives and negatives are noted above.  Otherwise negative for contributory info.            Physical Exam:     Blood pressure 101/63, pulse 62, temperature 97.4  F (36.3  C), temperature source Oral, resp. rate 18, height 1.803 m (5' 11\"), weight 91.1 kg (200 lb 12.8 oz), SpO2 98 %.  No intake or output data in the 24 hours ending 09/22/21 0900  Exam:  General - Awake alert and oriented, appears stated age  Pulm - Non-labored breathing with normal respiratory " effort  CVS - reg rate and rhythm, no peripheral edema  Abd - Soft, non tender, non distended.  No guarding, rigidity or peritoneal signs.   Rectal exam was not performed.   Neuro - CN II-XII grossly intact  Musculoskeletal - extremities with no clubbing, cyanosis or edema; able to ambulate  Psych - responsive, alert, cooperative; oriented x3; appropriate mood and affect  External/skin - inspection reveals no rashes, lesions or ulcers, normal coloring         Data Reviewed:     Recent Results (from the past 24 hour(s))   CT Chest (PE) Abdomen Pelvis w Contrast    Narrative    EXAM: CT CHEST PE ABDOMEN PELVIS W CONTRAST  LOCATION: [facility]  DATE/TIME: 9/22/2021 12:26 AM    INDICATION: Shortness of breath and cough. Fever, diarrhea, abdominal pain.  COMPARISON: None.  TECHNIQUE: CT chest pulmonary angiogram and routine CT abdomen pelvis with IV contrast. Arterial phase through the chest and venous phase through the abdomen and pelvis. Multiplanar reformats and MIP reconstructions were performed. Dose reduction   techniques were used.   CONTRAST: 99 mL Isovue-370    FINDINGS:  ANGIOGRAM CHEST: Pulmonary arteries are normal caliber and negative for pulmonary emboli. Thoracic aorta is negative for dissection. No CT evidence of right heart strain.     LUNGS AND PLEURA: No acute infiltrates or consolidation. Mild dependent atelectasis in the lower lungs. Mild paraseptal emphysematous changes in the upper lungs. Indeterminate 4 mm nodule right middle lobe on series 6 image 150. No pleural effusions.    MEDIASTINUM/AXILLAE: No adenopathy. Normal heart size. No pericardial effusion. Normal caliber thoracic aorta. Small hiatal hernia. Axillary regions are unremarkable.    CORONARY ARTERY CALCIFICATION: None.    HEPATOBILIARY: There is an indeterminate heterogeneous low-attenuation lesion with ill-defined borders in the right hepatic lobe posteriorly measuring approximately 4.8 x 4.7 x 3.9 cm. Given  the findings described below, this is concerning for potential   developing phlegmonous reaction/abscess within the liver. There is an adjacent small tubular shaped low-density structure seen adjacent to this process which could represent a partially thrombosed hepatic vein. Additional indeterminate 1.3 cm   low-attenuation lesion in the left hepatic lobe superiorly on image 21. No calcified gallstones or biliary dilatation.    PANCREAS: Normal.    SPLEEN: Normal.    ADRENAL GLANDS: Normal.    KIDNEYS/BLADDER: Normal.    BOWEL: Sigmoid diverticulosis. There appears to be some mild low-density thickening in the wall of the sigmoid colon. Immediately anterior to the sigmoid colon in the lower pelvis anteriorly, there is a complex peripherally enhancing fluid collection   with mildly thickened walls measuring up to 5.0 cm in diameter. This is seen situated just superior to the bladder and is most suggestive of an abscess collection. Additional 3.5 cm less well-circumscribed fluid collection adjacent to the sigmoid colon   on image 154 may represent an additional developing abscess. Findings would be most suggestive of diverticulitis with peridiverticular abscesses. No free air and no evidence for bowel obstruction. Appendix is normal. Close imaging follow-up is   recommended to monitor for resolution and exclude any underlying lesions.    LYMPH NODES: Normal.    VASCULATURE: Aortic calcification without aneurysm.    PELVIC ORGANS: Normal.    MUSCULOSKELETAL: No significant bony findings. Benign-appearing area of sclerosis in the left supra-acetabular region likely represents a benign bone island. Degenerative changes both hips.      Impression    IMPRESSION:  1.  Negative for pulmonary embolism. No acute findings in the chest.    2.  Technically indeterminate 4 mm pulmonary nodule in the right middle lobe. Recommend follow-up CT in one year for reevaluation.    3.  Mild paraseptal emphysematous changes in the upper  lungs.    4.  Sigmoid diverticulosis with mild wall thickening. Complex fluid collections are seen in the lower pelvis anteriorly situated between the sigmoid colon and superior aspect of the bladder with the largest collection measuring up to 5.0 cm. These are   concerning for abscesses and constellation of findings is most suggestive of diverticulitis with peridiverticular abscesses. Clinical correlation. Close imaging follow-up is recommended to monitor for resolution of these findings and exclude any   underlying lesions.    5.  Indeterminate heterogeneous low-attenuation lesion with ill-defined margins in the right hepatic lobe measuring up to 4.8 cm in diameter. This lesion is indeterminate but concerning for an area of phlegmonous change and potential developing abscess   within the liver. Additional indeterminate 1.3 cm low-attenuation lesion left hepatic lobe. MRI could be considered in further evaluation. Otherwise close follow-up in the liver is recommended to exclude any underlying lesion or progressive process.    6.  Small tubular shaped low-density structure seen in the right hepatic lobe is suggestive of a partially thrombosed intrahepatic vein.    Findings called to Dr. Astudillo 9/22/2021 12:50 AM.       Recent Labs   Lab 09/21/21 2143 09/20/21  1239   WBC 5.2 15.2*   HGB 13.6 13.4   HCT 40.8 40.4   MCV 84 85    271     Recent Labs   Lab 09/21/21 2143 09/20/21  1239   * 131*   POTASSIUM 3.8 4.0   CHLORIDE 100 102   CO2 19* 21   ANIONGAP 12 8   * 119*   BUN 17 18   CR 1.53* 1.53*   GFRESTIMATED 48* 48*   BEREKET 8.4* 8.7   PROTTOTAL 7.5  --    ALBUMIN 2.7*  --    BILITOTAL 0.7  --    ALKPHOS 148  --    AST 35  --    ALT 53  --          Assessment and Plan:   Boom Daniel is a 61 year old male with PMHx CKD 2, dyslipidemia, and JARED who presented with fevers, chills and shortness of breath. He was found to have thickening of the sigmoid colon with multiple abscesses in the pelvis and  liver, concerning for acute sigmoid diverticulitis. Plan for IR consultation for drainage of pelvic and liver abscess. ID consulted as well given liver abscess. Blood cultures pending. Recommend conservative management with bowel rest, IVF and IV Zosyn. Discussed possible surgical intervention this admission if fails to improve with conservative management. He will need a colonoscopy in 6-8 weeks after resolution of acute diverticulitis and consultation as an outpatient to discuss possible elective sigmoid colectomy.     Plan:  1. Admit to hospitalist service  2. Surgery: No indication for urgent surgery at this time.  3. Diet: NPO  4. IV Fluids: continue  5. Antibiotics:  Continue Zosyn, appreciate ID recs  6. Medications:  Not on blood thinners, PTA meds per hospitalist  7. I&O s:  strict I&O s   8. Labs:   - Reviewed: Yes  - Ordered: repeat CBC and BMP tomorrow   9. Imaging:   - Dr. Catherine and myself have personally viewed: CT abd/pelvis  - Ordered:  none  10. Activity:  OOB, ambulate as able  11. DVT prophylaxis: SCD s  12. This plan has been discussed with Dr. Catherine    Patient specific identified risk factors considered as part of today s evaluation include: CKD 2, JARED    Additional history obtained from chart review.  Time spent on consultation: 40 minutes, greater than 50% spent on counseling and/or coordination of care.    Ghada Langston (Hermes), JAZMIN  Colorectal Physician Assistant    [facility]  [address]  [place] [address]  T: [phone]  F: [phone]

## 2021-09-22 NOTE — PHARMACY-ADMISSION MEDICATION HISTORY
Pharmacy Medication History  Admission medication history interview status for the 9/21/2021  admission is complete. See EPIC admission navigator for prior to admission medications     Location of Interview: Patient room  Medication history sources: Patient and Surescripts    Significant changes made to the medication list:  Added losartan    In the past week, patient estimated taking medication this percent of the time: less than 50% due to running out    Additional medication history information: none    Medication reconciliation completed by provider prior to medication history? No    Time spent in this activity: 10 minutes    Prior to Admission medications    Medication Sig Last Dose Taking? Auth Provider   doxycycline hyclate (VIBRAMYCIN) 100 MG capsule Take 1 capsule (100 mg) by mouth 2 times daily 9/21/2021 at Unknown time Yes Margarita Cali PA-C   losartan (COZAAR) 50 MG tablet Take 50 mg by mouth daily Unknown at Unknown time Yes Unknown, Entered By History       The information provided in this note is only as accurate as the sources available at the time of update(s)     Donna Rubio, PharmD  Inpatient Clinical Pharmacist  [phone]

## 2021-09-22 NOTE — CONSULTS
"  Interventional Radiology - Pre-Procedure Note:  9/22/2021    Procedure Requested: Abdominal abscess drain placement  Requested by: Dr Blake    History and Physical Reviewed: H&P documented within 30 days (by Dr Blake on 9/22/21).  I have personally reviewed the patient's medical history and have updated the medical record as necessary.    Brief HPI: Boom Daniel is a 61 year old male admitted via ED with fever after imaging demonstrated intraabdominal abscess (see below). Colorectal surgery has evaluated the patient and recommended drain placement. Patient reports significant improvement in symptoms since admission. No complaints at this time.      IMAGING:  CT C/A/P w 9/22/21:  \"IMPRESSION:  1.  Negative for pulmonary embolism. No acute findings in the chest.     2.  Technically indeterminate 4 mm pulmonary nodule in the right middle lobe. Recommend follow-up CT in one year for reevaluation.     3.  Mild paraseptal emphysematous changes in the upper lungs.     4.  Sigmoid diverticulosis with mild wall thickening. Complex fluid collections are seen in the lower pelvis anteriorly situated between the sigmoid colon and superior aspect of the bladder with the largest collection measuring up to 5.0 cm. These are   concerning for abscesses and constellation of findings is most suggestive of diverticulitis with peridiverticular abscesses. Clinical correlation. Close imaging follow-up is recommended to monitor for resolution of these findings and exclude any   underlying lesions.     5.  Indeterminate heterogeneous low-attenuation lesion with ill-defined margins in the right hepatic lobe measuring up to 4.8 cm in diameter. This lesion is indeterminate but concerning for an area of phlegmonous change and potential developing abscess   within the liver. Additional indeterminate 1.3 cm low-attenuation lesion left hepatic lobe. MRI could be considered in further evaluation. Otherwise close follow-up in the liver is " "recommended to exclude any underlying lesion or progressive process.     6.  Small tubular shaped low-density structure seen in the right hepatic lobe is suggestive of a partially thrombosed intrahepatic vein.\"    NPO: YES since [place]  ANTICOAGULANTS: NONE  ANTIBIOTICS: Ceftriaxone 2g IV daily, metronidazole 500mg IV BID    ALLERGIES  No Known Allergies      LABS:  INR   Date Value Ref Range Status   09/22/2021 1.23 (H) 0.85 - 1.15 Final     Comment:     Effective 7/11/2021, the reference range for this assay has changed.      Hemoglobin   Date Value Ref Range Status   09/21/2021 13.6 13.3 - 17.7 g/dL Final   ]  Platelet Count   Date Value Ref Range Status   09/21/2021 261 150 - 450 10e3/uL Final     Creatinine   Date Value Ref Range Status   09/22/2021 1.26 (H) 0.66 - 1.25 mg/dL Final     Potassium   Date Value Ref Range Status   09/22/2021 4.1 3.4 - 5.3 mmol/L Final         EXAM:  /71   Pulse 59   Temp 97.6  F (36.4  C) (Oral)   Resp 18   Ht 1.803 m (5' 11\")   Wt 91.1 kg (200 lb 12.8 oz)   SpO2 97%   BMI 28.01 kg/m    General:  Stable.  In no acute distress.    Neuro:  A&O x 3. Moves all extremities equally.  Resp:  Lungs clear to auscultation bilaterally.  Cardio:  RRR, no murmur    Pre-Sedation Code Status Assessment:  Code Status: Full Code intra procedure, per discussion with patient.         ASSESSMENT/PLAN:   Diverticulitis with abdominal abscess    -Abscess drain placement in CT today  -Keep NPO for procedure    Procedure, risks/benefits, details, alternatives, and sedation reviewed with patient and patient verbalized understanding. All questions answered. OK to proceed with above radiology procedure.     Rafael Prabhakar PA-C  Interventional Radiology  [phone]      Total time spent on the date of the encounter is 45 minutes, including time spent counseling the patient, performing a medically appropriate evaluation, reviewing prior medical history, ordering medications and tests, " documenting clinical information in the medical record, and communication of results.

## 2021-09-22 NOTE — H&P
"[facility]  History and Physical  Hospitalist       Date of Admission:  9/21/2021    Assessment & Plan   Boom Daniel is a very pleasant 61 year old male with CKD stage 2, dyslipidemia, JARED on CPAP, tobacco abuse history who was admitted on 9/21/2021 with fever, chills and shortness of breath with findings of intra-abdominal infection including multiple abscesses of the abdomen, pelvis and liver.     Diverticulitis with multiple abscesses  Hepatic abscess  Sepsis secondary to intra-abdominal infection  MRSA history (in nasal swab but no known infection)  No prior history of diverticulosis or abdominal surgeries. Had \"normal\" colonoscopy around age 50 years and no repeat colonoscopy since. CT with several abnormal findings including several complex fluid collections near the sigmoid colon, abutting the bladder and within 2 liver lobes. Meets sepsis criteria with WBC 15,200 yesterday (prior to initiating antibiotic coverage with doxycycline for presumed pneumonia), tachycardic to 108, fever 103.2 F. Low albumin (2.7) but otherwise liver function tests are normal. Only recent travel was to [facility] last week when he played golf but denies any potential ingestion of unfiltered or contaminated water. Suspect the urinary urgency symptom may be from bladder irritation from abscess near the bladder. UA benign.   -admit to inpatient  -zosyn q 6 hours  -follow up blood cultures   -IR consultation for potential drain placement(s)  -colorectal surgery consultation  -Infectious Disease consultation  -pain control: prn acetaminophen, IV/po dilaudid  -antipyretics: prn acetaminophen, ibuprofen, cold packs  -antiemetics: prn zofran, compazine  -NPO except ice chips, meds in case of procedure later today  -defer any further imaging (I.e. MRI) until seen by consultants    CKD stage 2  Hyponatremia  Baseline GFR 60-70 and now is 48. Suspect some prerenal etiology with dehydration. Sodium 131 with " normal baseline.   -IV fluids with normal saline for now  -recheck BMP later today    Cough, shortness of breath  Tobacco abuse history  Chest CT negative for PE or consolidation suggestive of infection. Does have emphysematous changes in upper lungs and given tobacco abuse may have some obstructive lung disease.   -prn oxygen as needed  -trial prn albuterol for wheezing/shortness of breath    Pulmonary nodule, right middle lobe  Solitary pulmonary nodule 4 mm noted as incidental finding of Chest/abdomen/pelvis CT.   -repeat chest CT with contrast in 12 months for reevaluation, sooner if concerning symptoms arise    Chronic, stable problems:  JARED: Resume PTA CPAP per home settings   Dyslipidemia: Hold statin for now    Asymptomatic Rule Out of COVID-19 infection  This patient was evaluated during a global COVID-19 pandemic, which necessitated consideration that the patient might be at risk for infection with the SARS-CoV-2 virus that causes COVID-19. Applicable protocols for evaluation were followed during the patient's care. Low suspicion for infection. Vaccination status: FULLY VACCINATED  -follow-up COVID-19 PCR test result => negative    Clinically Significant Risk Factors Present on Admission         # Hyponatremia: Na = 131 mmol/L (Ref range: 133 - 144 mmol/L) on admission, will monitor as appropriate           DVT prophylaxis: Pneumatic Compression Devices and Ambulate every shift  Code Status: Full    Disposition: Expected discharge in 2-3 days. Part of discharge instructions will need pulmonary nodule follow up with repeat imaging in 6-12 months.     Alma Delia Blake MD  Text Page    ~~~~~~~~~~~~~~~~~~~~~~~~~~~~~~~~~~~~~~~~~~~~~~~~~~~~~  Primary Care Physician   [facility]    Chief Complaint   Shortness of breath, fever, cough    History is obtained from the patient and medical records.    History of Present Illness   Boom Daniel is a very pleasant 61 year old male with CKD stage 2,  "dyslipidemia, JARED on CPAP, tobacco abuse history who presents with cough, fever and chills. He presented to the ED initially on 9/20/21 with fever, shortness of breath and was noted to have elevated WBC. Not hypoxic and chest xray normal. He tested negative for covid, though he felt as though he had a lung infection. He has been fully vaccinated for covid-19. He was discharged home with an oral doxycycline course to treat presumed pneumonia. Returned home and continued to feel poorly, gradually developing fever and rigors. He felt \"miserable\" and at his wife's insistence he returned to the ED. He continues to deny abdominal pain or cramping but does endorse some increased urinary urgency. He again tested negative for covid on this admission. Cough is described as dry and nagging but he notably did not cough throughout the entire interview and exam.     Only recent travel has been to [place], [place] for about a week, where he was golfing and \"partying\" but did not spend time in the woods or drink any potentially contaminated lake water or raw fish. No known tick exposure. Denies chest pain or pressure, neck stiffness, rashes, vision changes, dizziness, blood in stool, hemoptysis, hematuria.     Case reviewed with Dr. Astudillo from the Emergency Department. Labs and available imaging reviewed. Pertinent results include: Temp 103.2 F, pulse 108, blood pressure 101/68. WBC 15.2k yesterday and after starting doxycycline it is 5k. 12 lead EKG with sinus tachycardia. CT chest, abdomen and pelvis detailed in assessment & plan.     Past Medical History    I have reviewed this patient's medical history and updated it with pertinent information if needed.   Dyslipidemia  Gout  CKD stage 2-3    Past Surgical History   I have reviewed this patient's surgical history and updated it with pertinent information if needed.  No prior surgeries.     Prior to Admission Medications   Prior to Admission Medications   Prescriptions Last Dose " Informant Patient Reported? Taking?   doxycycline hyclate (VIBRAMYCIN) 100 MG capsule   No No   Sig: Take 1 capsule (100 mg) by mouth 2 times daily      Facility-Administered Medications: None     Allergies   No Known Allergies    Social History   I have reviewed this patient's social history and updated it with pertinent information if needed. Boom Daniel  is a former smoker. Denies illicit drug abuse. Drinks alcohol most days of the week and more heavily on weekends. 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     Family History   I have reviewed this patient's family history and updated it with pertinent information if needed.   No family history of GI or liver malignancy.    Review of Systems   The 10 point Review of Systems is negative other than noted in the HPI or here.    Physical Exam   Temp: 98.1  F (36.7  C) Temp src: Oral BP: 114/61 Pulse: 85     SpO2: 97 % O2 Device: None (Room air)    Vital Signs with Ranges  Temp:  [98.1  F (36.7  C)-103.2  F (39.6  C)] 98.1  F (36.7  C)  Pulse:  [] 85  BP: (101-138)/(59-69) 114/61  SpO2:  [94 %-99 %] 97 %  0 lbs 0 oz    Constitutional: Alert, NAD, pleasant and interactive  Eyes: PERRL, sclerae anicteric  HEENT: mmm, atraumatic  Respiratory: Lungs CTAB, no wheezes or crackles  Cardiovascular: RRR, no murmurs  no LE edema  GI: protuberant, soft, non-tender, non-distended, no rebound tenderness or guarding  Skin/Integument: warm, dry, no acute rashes  Genitourinary: mild suprapubic tenderness  Musc: SANTOS, normal limb strength x 4  Neuro: AOx3, no focal deficits, no tremors  Psych: calm affect, not anxious, not confused      Data   Data reviewed today:  I personally reviewed: CT with several abnormal findings including several complex fluid collections near the sigmoid colon, abutting the bladder and within 2 liver lobes.  Recent Labs   Lab 09/21/21  2143 09/20/21  1239   WBC 5.2 15.2*   HGB 13.6 13.4   MCV 84 85    271   * 131*   POTASSIUM 3.8 4.0   CHLORIDE  100 102   CO2 19* 21   BUN 17 18   CR 1.53* 1.53*   ANIONGAP 12 8   BEREKET 8.4* 8.7   * 119*   ALBUMIN 2.7*  --    PROTTOTAL 7.5  --    BILITOTAL 0.7  --    ALKPHOS 148  --    ALT 53  --    AST 35  --    TROPONIN <0.015 <0.015       Imaging:  Recent Results (from the past 24 hour(s))   CT Chest (PE) Abdomen Pelvis w Contrast    Narrative    EXAM: CT CHEST PE ABDOMEN PELVIS W CONTRAST  LOCATION: [facility]  DATE/TIME: 9/22/2021 12:26 AM    INDICATION: Shortness of breath and cough. Fever, diarrhea, abdominal pain.  COMPARISON: None.  TECHNIQUE: CT chest pulmonary angiogram and routine CT abdomen pelvis with IV contrast. Arterial phase through the chest and venous phase through the abdomen and pelvis. Multiplanar reformats and MIP reconstructions were performed. Dose reduction   techniques were used.   CONTRAST: 99 mL [id number]    FINDINGS:  ANGIOGRAM CHEST: Pulmonary arteries are normal caliber and negative for pulmonary emboli. Thoracic aorta is negative for dissection. No CT evidence of right heart strain.     LUNGS AND PLEURA: No acute infiltrates or consolidation. Mild dependent atelectasis in the lower lungs. Mild paraseptal emphysematous changes in the upper lungs. Indeterminate 4 mm nodule right middle lobe on series 6 image 150. No pleural effusions.    MEDIASTINUM/AXILLAE: No adenopathy. Normal heart size. No pericardial effusion. Normal caliber thoracic aorta. Small hiatal hernia. Axillary regions are unremarkable.    CORONARY ARTERY CALCIFICATION: None.    HEPATOBILIARY: There is an indeterminate heterogeneous low-attenuation lesion with ill-defined borders in the right hepatic lobe posteriorly measuring approximately 4.8 x 4.7 x 3.9 cm. Given the findings described below, this is concerning for potential   developing phlegmonous reaction/abscess within the liver. There is an adjacent small tubular shaped low-density structure seen adjacent to this process which could  represent a partially thrombosed hepatic vein. Additional indeterminate 1.3 cm   low-attenuation lesion in the left hepatic lobe superiorly on image 21. No calcified gallstones or biliary dilatation.    PANCREAS: Normal.    SPLEEN: Normal.    ADRENAL GLANDS: Normal.    KIDNEYS/BLADDER: Normal.    BOWEL: Sigmoid diverticulosis. There appears to be some mild low-density thickening in the wall of the sigmoid colon. Immediately anterior to the sigmoid colon in the lower pelvis anteriorly, there is a complex peripherally enhancing fluid collection   with mildly thickened walls measuring up to 5.0 cm in diameter. This is seen situated just superior to the bladder and is most suggestive of an abscess collection. Additional 3.5 cm less well-circumscribed fluid collection adjacent to the sigmoid colon   on image 154 may represent an additional developing abscess. Findings would be most suggestive of diverticulitis with peridiverticular abscesses. No free air and no evidence for bowel obstruction. Appendix is normal. Close imaging follow-up is   recommended to monitor for resolution and exclude any underlying lesions.    LYMPH NODES: Normal.    VASCULATURE: Aortic calcification without aneurysm.    PELVIC ORGANS: Normal.    MUSCULOSKELETAL: No significant bony findings. Benign-appearing area of sclerosis in the left supra-acetabular region likely represents a benign bone island. Degenerative changes both hips.      Impression    IMPRESSION:  1.  Negative for pulmonary embolism. No acute findings in the chest.    2.  Technically indeterminate 4 mm pulmonary nodule in the right middle lobe. Recommend follow-up CT in one year for reevaluation.    3.  Mild paraseptal emphysematous changes in the upper lungs.    4.  Sigmoid diverticulosis with mild wall thickening. Complex fluid collections are seen in the lower pelvis anteriorly situated between the sigmoid colon and superior aspect of the bladder with the largest collection  measuring up to 5.0 cm. These are   concerning for abscesses and constellation of findings is most suggestive of diverticulitis with peridiverticular abscesses. Clinical correlation. Close imaging follow-up is recommended to monitor for resolution of these findings and exclude any   underlying lesions.    5.  Indeterminate heterogeneous low-attenuation lesion with ill-defined margins in the right hepatic lobe measuring up to 4.8 cm in diameter. This lesion is indeterminate but concerning for an area of phlegmonous change and potential developing abscess   within the liver. Additional indeterminate 1.3 cm low-attenuation lesion left hepatic lobe. MRI could be considered in further evaluation. Otherwise close follow-up in the liver is recommended to exclude any underlying lesion or progressive process.    6.  Small tubular shaped low-density structure seen in the right hepatic lobe is suggestive of a partially thrombosed intrahepatic vein.    Findings called to Dr. Astudillo 9/22/2021 12:50 AM.

## 2021-09-23 LAB
ANION GAP SERPL CALCULATED.3IONS-SCNC: 8 MMOL/L (ref 3–14)
APTT PPP: 75 SECONDS (ref 22–38)
APTT PPP: 88 SECONDS (ref 22–38)
BUN SERPL-MCNC: 14 MG/DL (ref 7–30)
CALCIUM SERPL-MCNC: 8.3 MG/DL (ref 8.5–10.1)
CHLORIDE BLD-SCNC: 106 MMOL/L (ref 94–109)
CO2 SERPL-SCNC: 22 MMOL/L (ref 20–32)
CREAT SERPL-MCNC: 1.2 MG/DL (ref 0.66–1.25)
ERYTHROCYTE [DISTWIDTH] IN BLOOD BY AUTOMATED COUNT: 13.2 % (ref 10–15)
ERYTHROCYTE [DISTWIDTH] IN BLOOD BY AUTOMATED COUNT: 13.2 % (ref 10–15)
GFR SERPL CREATININE-BSD FRML MDRD: 65 ML/MIN/1.73M2
GLUCOSE BLD-MCNC: 89 MG/DL (ref 70–99)
HCT VFR BLD AUTO: 34.2 % (ref 40–53)
HCT VFR BLD AUTO: 34.7 % (ref 40–53)
HGB BLD-MCNC: 11.3 G/DL (ref 13.3–17.7)
HGB BLD-MCNC: 11.7 G/DL (ref 13.3–17.7)
MCH RBC QN AUTO: 27.8 PG (ref 26.5–33)
MCH RBC QN AUTO: 28.5 PG (ref 26.5–33)
MCHC RBC AUTO-ENTMCNC: 33 G/DL (ref 31.5–36.5)
MCHC RBC AUTO-ENTMCNC: 33.7 G/DL (ref 31.5–36.5)
MCV RBC AUTO: 84 FL (ref 78–100)
MCV RBC AUTO: 85 FL (ref 78–100)
PLATELET # BLD AUTO: 211 10E3/UL (ref 150–450)
PLATELET # BLD AUTO: 214 10E3/UL (ref 150–450)
POTASSIUM BLD-SCNC: 3.7 MMOL/L (ref 3.4–5.3)
RBC # BLD AUTO: 4.06 10E6/UL (ref 4.4–5.9)
RBC # BLD AUTO: 4.1 10E6/UL (ref 4.4–5.9)
SODIUM SERPL-SCNC: 136 MMOL/L (ref 133–144)
WBC # BLD AUTO: 11.2 10E3/UL (ref 4–11)
WBC # BLD AUTO: 11.2 10E3/UL (ref 4–11)

## 2021-09-23 PROCEDURE — 36415 COLL VENOUS BLD VENIPUNCTURE: CPT | Performed by: INTERNAL MEDICINE

## 2021-09-23 PROCEDURE — 250N000013 HC RX MED GY IP 250 OP 250 PS 637: Performed by: INTERNAL MEDICINE

## 2021-09-23 PROCEDURE — 250N000013 HC RX MED GY IP 250 OP 250 PS 637: Performed by: SPECIALIST

## 2021-09-23 PROCEDURE — 99233 SBSQ HOSP IP/OBS HIGH 50: CPT | Performed by: INTERNAL MEDICINE

## 2021-09-23 PROCEDURE — 250N000011 HC RX IP 250 OP 636

## 2021-09-23 PROCEDURE — 85027 COMPLETE CBC AUTOMATED: CPT | Performed by: INTERNAL MEDICINE

## 2021-09-23 PROCEDURE — 85730 THROMBOPLASTIN TIME PARTIAL: CPT

## 2021-09-23 PROCEDURE — 36415 COLL VENOUS BLD VENIPUNCTURE: CPT

## 2021-09-23 PROCEDURE — 85027 COMPLETE CBC AUTOMATED: CPT

## 2021-09-23 PROCEDURE — 250N000011 HC RX IP 250 OP 636: Performed by: SPECIALIST

## 2021-09-23 PROCEDURE — 85730 THROMBOPLASTIN TIME PARTIAL: CPT | Performed by: INTERNAL MEDICINE

## 2021-09-23 PROCEDURE — 258N000003 HC RX IP 258 OP 636: Performed by: NURSE PRACTITIONER

## 2021-09-23 PROCEDURE — 80048 BASIC METABOLIC PNL TOTAL CA: CPT | Performed by: INTERNAL MEDICINE

## 2021-09-23 PROCEDURE — 120N000004 HC R&B MS OVERFLOW

## 2021-09-23 RX ORDER — HEPARIN SODIUM 10000 [USP'U]/100ML
0-5000 INJECTION, SOLUTION INTRAVENOUS CONTINUOUS
Status: DISCONTINUED | OUTPATIENT
Start: 2021-09-23 | End: 2021-09-23

## 2021-09-23 RX ORDER — HEPARIN SODIUM 10000 [USP'U]/100ML
0-5000 INJECTION, SOLUTION INTRAVENOUS CONTINUOUS
Status: DISCONTINUED | OUTPATIENT
Start: 2021-09-23 | End: 2021-09-24

## 2021-09-23 RX ADMIN — SODIUM CHLORIDE, POTASSIUM CHLORIDE, SODIUM LACTATE AND CALCIUM CHLORIDE: 600; 310; 30; 20 INJECTION, SOLUTION INTRAVENOUS at 19:33

## 2021-09-23 RX ADMIN — SODIUM CHLORIDE, POTASSIUM CHLORIDE, SODIUM LACTATE AND CALCIUM CHLORIDE: 600; 310; 30; 20 INJECTION, SOLUTION INTRAVENOUS at 12:54

## 2021-09-23 RX ADMIN — METRONIDAZOLE 500 MG: 500 TABLET ORAL at 09:04

## 2021-09-23 RX ADMIN — CEFTRIAXONE SODIUM 2 G: 2 INJECTION, POWDER, FOR SOLUTION INTRAMUSCULAR; INTRAVENOUS at 09:04

## 2021-09-23 RX ADMIN — METRONIDAZOLE 500 MG: 500 TABLET ORAL at 20:35

## 2021-09-23 RX ADMIN — SODIUM CHLORIDE, POTASSIUM CHLORIDE, SODIUM LACTATE AND CALCIUM CHLORIDE: 600; 310; 30; 20 INJECTION, SOLUTION INTRAVENOUS at 04:43

## 2021-09-23 RX ADMIN — APIXABAN 10 MG: 5 TABLET, FILM COATED ORAL at 09:04

## 2021-09-23 RX ADMIN — HEPARIN SODIUM 1650 UNITS/HR: 10000 INJECTION, SOLUTION INTRAVENOUS at 10:49

## 2021-09-23 ASSESSMENT — ACTIVITIES OF DAILY LIVING (ADL)
ADLS_ACUITY_SCORE: 6
ADLS_ACUITY_SCORE: 7
ADLS_ACUITY_SCORE: 7
ADLS_ACUITY_SCORE: 6
ADLS_ACUITY_SCORE: 6
ADLS_ACUITY_SCORE: 7

## 2021-09-23 NOTE — PROGRESS NOTES
[facility]  Vascular Medicine Progress Note          Assessment and Plan:       Perforated sigmoid diverticulitis with neena-diverticular abscess  Developing right hepatic lobe abscess  Likely partially thrombosed intrahepatic vein in the right hepatic lobe     -S/P abdominal abscess drain placement in IR yesterday. Fevers last night to 103.2F. ID is following and has him on antibiotics.     -D dimer was elevated, but this is not helpful in the setting of abscesses and diverticulitis. There is not a high risk of extension of thrombus in the intrahepatic vein currently.     -Eliquis started yesterday, but then had fevers as above. Will switch to IV UFH for the time being in case emergent surgical procedures required.  When it is clear no further procedures are planned and the patient is clear for discharge, it is recommended that he be placed on Eliquis 10 mg PO BID for one week, then 5 mg PO BID thereafter.    -F/U with Lovely Kennedy PA-C one to two weeks after discharge. In 3 months, obtain CT venogram of the abdomen as well as a d.dimer and have him follow-up in our clinic to discuss possible cessation of anticoagulation.                 Interval History:   doing well; no cp, sob, n/v/d, or abd pain currnetly. Fevers last night, now resolved.              Review of Systems:   The 10 point Review of Systems is negative other than noted in the HPI             Medications:       cefTRIAXone  2 g Intravenous Q24H     heparin ANTICOAGULANT Loading dose  80 Units/kg Intravenous Once     meperidine  25 mg Intravenous Once     metroNIDAZOLE  500 mg Oral BID     sodium chloride (PF)  10 mL Irrigation Q8H     sodium chloride (PF)  3 mL Intracatheter Q8H                  Physical Exam:     Patient Vitals for the past 24 hrs:   BP Temp Temp src Pulse Resp SpO2   09/23/21 0908 (!) 147/76 98.9  F (37.2  C) Oral 72 18 96 %   09/23/21 0630 -- 98.3  F (36.8  C) Oral -- -- --   09/23/21 0307 118/66 98.5  F (36.9  C)  Oral 70 18 95 %   09/23/21 0100 108/59 98.3  F (36.8  C) Oral 71 16 95 %   09/22/21 2230 103/56 98.5  F (36.9  C) Oral 77 16 96 %   09/22/21 2000 110/60 99.1  F (37.3  C) Oral 85 16 96 %   09/22/21 1921 -- (!) 101.7  F (38.7  C) Oral -- -- --   09/22/21 1838 -- -- -- -- 16 --   09/22/21 1803 129/60 (!) 103.2  F (39.6  C) Axillary -- -- --   09/22/21 1739 (!) 140/60 (!) 103.1  F (39.5  C) Oral 101 18 94 %   09/22/21 1627 -- 98  F (36.7  C) Oral -- -- --   09/22/21 1615 -- (!) 101  F (38.3  C) Rectal -- -- --   09/22/21 1552 (!) 173/89 -- -- 110 22 99 %   09/22/21 1516 135/75 -- -- 66 18 99 %   09/22/21 1500 121/69 -- -- 61 18 99 %   09/22/21 1445 127/69 -- -- 64 16 97 %   09/22/21 1430 113/66 -- -- 66 16 97 %   09/22/21 1404 110/62 -- -- 56 16 98 %   09/22/21 1357 113/52 -- -- 69 16 99 %   09/22/21 1350 112/50 -- -- 74 16 99 %   09/22/21 1335 119/71 -- -- 64 18 98 %   09/22/21 1124 118/71 97.6  F (36.4  C) Oral 59 18 97 %     Wt Readings from Last 4 Encounters:   09/22/21 91.1 kg (200 lb 12.8 oz)   09/20/21 89.4 kg (197 lb)       Intake/Output Summary (Last 24 hours) at 9/23/2021 0957  Last data filed at 9/23/2021 0900  Gross per 24 hour   Intake 1704 ml   Output 1745 ml   Net -41 ml     Constitutional: normal  Eyes: normal  ENT: normal  Neck: Supple, symmetrical, trachea midline, no adenopathy, thyroid symmetric, not enlarged and no tenderness, skin normal.  Hematologic / Lymphatic: normal  Back: normal  Lungs: No increased work of breathing, good air exchange, clear to auscultation bilaterally, no crackles or wheezing.  Cardiovascular: Regular rate and rhythm, normal S1 and S2, no S3 or S4, and no murmur noted.  Chest / Breast: normal  Abdomen: No scars, normal bowel sounds, soft, non-distended, non-tender, no masses palpated, no hepatosplenomegaly, drain in place  Genitourinary: normal  Musculoskeletal: normal  Neurologic: normal  Neuropsychiatric: normal  Skin: normal           Data:     Results for orders placed  or performed during the hospital encounter of 09/21/21 (from the past 24 hour(s))   Basic metabolic panel   Result Value Ref Range    Sodium 140 133 - 144 mmol/L    Potassium 4.1 3.4 - 5.3 mmol/L    Chloride 111 (H) 94 - 109 mmol/L    Carbon Dioxide (CO2) 20 20 - 32 mmol/L    Anion Gap 9 3 - 14 mmol/L    Urea Nitrogen 16 7 - 30 mg/dL    Creatinine 1.26 (H) 0.66 - 1.25 mg/dL    Calcium 7.9 (L) 8.5 - 10.1 mg/dL    Glucose 95 70 - 99 mg/dL    GFR Estimate 61 >60 mL/min/1.73m2   INR   Result Value Ref Range    INR 1.23 (H) 0.85 - 1.15   [place] [facility] IP Consult: Patient to be seen: Routine - within 24 hours; hepatic vein trhombosis related possible hepatic abscess, ? anticoagulation; Consultant may enter orders: Yes; Requesting provider? Hospitalist (if different fr...    Jimmy Masterson MD     9/22/2021  4:27 PM  [facility]    Vascular Medicine Consultation     Attestation: I have examined the patient independently of Lovely Kennedy PA-C and agree with the examination and plan as   delineated below.    Jimmy Rausch MD      Date of Admission:  9/21/2021  Date of Consult (When I saw the patient): 09/22/21    Assessment & Plan   Perforated sigmoid diverticulitis with neena-diverticular abscess  Developing right hepatic lobe abscess  Likely partially thrombosed intrahepatic vein in the right   hepatic lobe    This patient's course is complicated and deserves to be inpatient   status admission. He went to IR for abdominal abscess drain   placement today. ID is following and has him on antibiotics.   D.dimer was elevated, but this is not helpful in the setting of   abscesses and diverticulitis. There is not a high risk of   extension of thrombus in the intrahepatic vein currently.   Considering he just had a drain placed into his abdomen, will   hold off on initiating any anticoagulation at present time.   However, this thrombus will eventually need  to be treated. When   no further procedures are planned and the patient is clear for   discharge, it is recommended that he be placed on Xarelto 15 mg   BID for 3 weeks followed by 20 mg daily thereafter. In 3 months,   we would then want to have him get a CT venogram of the abdomen   as well as a d.dimer and have him follow-up in our clinic to   discuss possible cessation of anticoagulation.       Reason for Consult   Reason for consult: Asked by Dr. Childress to evaluate and provide   recommendations regarding anticoagulation in this 61 year old   male presenting with diverticulitis with abdominal abscess and   developing right hepatic lobe abscess also noted to have findings   suggestive of partially thrombosed intrahepatic vein in the right   hepatic lobe.    Primary Care Physician   [facility] MEDICINE      History of Present Illness   Boom Daniel is a 61 year old male former smoker with CKD, gout   and sleep apnea who developed abdominal pain and non bloody   diarrhea with anorexia 9/18. He also had a progressive cough. On   9/20 he developed a fever and was seen in the ED. Abdominal exam   was benign and he was prescribed oral Doxycycline for possible   atypical pneumonia. His Covid returned negative and he has been   fully vaccinated.      He returned for evaluation on 9/21 with a fever of 103 degrees   and leukocytosis. CT of the abdomen was performed revealing   perforated sigmoid diverticulitis with fluid collection between   the sigmoid colon and bladder related to peridiverticular   abscess. A lesion was also seen in the right hepatic lobe   concerning for an evolving abscess. He was initiated on Zosyn and   ID was consulted. Colorectal has also seen the patient and has no   plans for surgical intervention. However, IR is planning on   placing a drain in abdominal abscess. Upon further review of   imaging, it was incidentally noted that he also has a small   tubular low-density structure seen in  the right hepatic lobe   suggestive of a partially thrombosed intrahepatic vein. We were   consulted to determine the need for anticoagulation.     His abdominal pain has improved. Had a normal bowel movement last   night. Fevers have resolved. Drain was placed today with small   amount of bloody output. Appetite is good.     Past Medical History   Dyslipidemia  Gout  CKD stage 2-3    Past Surgical History   No past surgical history on file.    Prior to Admission Medications   Prior to Admission Medications   Prescriptions Last Dose Informant Patient Reported? Taking?   doxycycline hyclate (VIBRAMYCIN) 100 MG capsule 9/21/2021 at   Unknown time  No Yes   Sig: Take 1 capsule (100 mg) by mouth 2 times daily   losartan (COZAAR) 50 MG tablet Unknown at Unknown time  Yes Yes   Sig: Take 50 mg by mouth daily      Facility-Administered Medications: None     Allergies   No Known Allergies    Social History   Boom Daniel    is a former smoker. Denies illicit drug abuse.   Drinks alcohol most days of the week and more heavily on   weekends. Retired from [employer] industry.     Family History   No family history on file.    Review of Systems   The 10 point Review of Systems is negative other than noted in   the HPI or here.     Physical Exam   Temp: 97.6  F (36.4  C) Temp src: Oral BP: 113/66 Pulse: 66     Resp: 16 SpO2: 97 % O2 Device: None (Room air) Oxygen Delivery: 1   LPM  Vital Signs with Ranges  Temp:  [97.4  F (36.3  C)-103.2  F (39.6  C)] 97.6  F (36.4  C)  Pulse:  [] 66  Resp:  [16-18] 16  BP: ()/(50-71) 113/66  SpO2:  [94 %-99 %] 97 %  200 lbs 12.8 oz    Constitutional: awake, alert, cooperative, no apparent distress,   and appears stated age  Eyes: Lids and lashes normal, pupils equal, round and reactive to   light, extra ocular muscles intact, sclera clear, conjunctiva   normal  ENT: normocepalic, without obvious abnormality, oropharynx pink   and moist  Hematologic / Lymphatic: no  lymphadenopathy  Respiratory: No increased work of breathing, good air exchange,   clear to auscultation bilaterally, no crackles or wheezing  Cardiovascular: regular rate and rhythm, normal S1 and S2 and no   murmur noted  GI: Normal bowel sounds, soft, non-distended, non-tender, HAYDEE   drain with bloody output.   Skin: no redness, warmth, or swelling, no rashes and no lesions  Musculoskeletal: There is no redness, warmth, or swelling of the   joints.  Full range of motion noted.  Motor strength is 5 out of   5 all extremities bilaterally.  Tone is normal.  Neurologic: Awake, alert, oriented to name, place and time.    Cranial nerves II-XII are grossly intact.  Motor is 5 out of 5   bilaterally.    Neuropsychiatric:  Normal affect, memory, insight.      Data   Most Recent 3 CBC's:  Recent Labs   Lab Test 09/21/21  2143 09/20/21  1239   WBC 5.2 15.2*   HGB 13.6 13.4   MCV 84 85    271     Most Recent 3 BMP's:  Recent Labs   Lab Test 09/22/21  1002 09/21/21  2143 09/20/21  1239    131* 131*   POTASSIUM 4.1 3.8 4.0   CHLORIDE 111* 100 102   CO2 20 19* 21   BUN 16 17 18   CR 1.26* 1.53* 1.53*   ANIONGAP 9 12 8   BEREKET 7.9* 8.4* 8.7   GLC 95 105* 119*     Most Recent 3 INR's:  Recent Labs   Lab Test 09/22/21  1002   INR 1.23*     Most Recent Cholesterol Panel:No lab results found.  Most Recent Hemoglobin A1c:No lab results found.           Gram Stain    Specimen: Abdomen; Aspirate   Result Value Ref Range    Gram Stain Result No organisms seen     Gram Stain Result 4+ WBC seen    CT Abdomen Peritonium Abscess Drainage    Narrative    CT ABDOMEN PERITONEUM ABSCESS DRAIN WITH CATHETER PLACE  9/22/2021  2:34 PM     HISTORY: Liver abscess, multiple colon abscesses. Percutaneous  drainage requested by primary service.    COMPARISON: None.     TECHNIQUE: Volumetric helical acquisition of CT images were acquired  without contrast. Radiation dose for this scan was reduced using  automated exposure control, adjustment  of the mA and/or kV according  to patient size, or iterative reconstruction technique.    MEDICATIONS:  2mg versed, 100mcg fentanyl given by RORY Manrique;  10ml  lidocaine given by Dr Deleon    FINDINGS: After written and oral informed consent was obtained, and  pause for the cause correctly identified the patient and procedure,  the patient was scanned in supine position for procedural planning.  The subcutaneous tissues overlying the fluid collection in the pelvis  was identified, marked, prepped and draped in the usual sterile  fashion, and anesthetized with 10 mL of 1% subcutaneous lidocaine. The  fluid collection was accessed with a hollow bore needle, and purulent  material was aspirated. An 0.035 floppy tipped wire was advanced into  the fluid collection and the needle was removed over the wire. The  tract was serially dilated.  A 10 Urdu locking pigtail catheter was  advanced into the abscess and its position was verified with  postprocedural imaging. The catheter was fixed to the overlying skin  using an adhesive bandage. There were no immediate complications and  patient left the CT suite in satisfactory condition.     CONSCIOUS SEDATION NOTE: After obtaining consent for sedation,  conscious sedation was induced using IV Versed and IV fentanyl.  Patient was monitored by nurse under my direct supervision throughout  the exam. There were no complications of the sedation. 15 minutes  face-to-face time.      Impression    IMPRESSION:   1. Technically successful CT guided abscess drain placement.   2. Conscious sedation.   Glucose by meter   Result Value Ref Range    GLUCOSE BY METER POCT 68 (L) 70 - 99 mg/dL   Glucose by meter   Result Value Ref Range    GLUCOSE BY METER POCT 76 70 - 99 mg/dL   Comprehensive metabolic panel   Result Value Ref Range    Sodium 140 133 - 144 mmol/L    Potassium 3.8 3.4 - 5.3 mmol/L    Chloride 108 94 - 109 mmol/L    Carbon Dioxide (CO2) 19 (L) 20 - 32 mmol/L    Anion Gap  13 3 - 14 mmol/L    Urea Nitrogen 15 7 - 30 mg/dL    Creatinine 1.31 (H) 0.66 - 1.25 mg/dL    Calcium 8.4 (L) 8.5 - 10.1 mg/dL    Glucose 81 70 - 99 mg/dL    Alkaline Phosphatase 131 40 - 150 U/L    AST 27 0 - 45 U/L    ALT 44 0 - 70 U/L    Protein Total 7.2 6.8 - 8.8 g/dL    Albumin 2.5 (L) 3.4 - 5.0 g/dL    Bilirubin Total 1.0 0.2 - 1.3 mg/dL    GFR Estimate 58 (L) >60 mL/min/1.73m2   CBC with platelets   Result Value Ref Range    WBC Count 6.9 4.0 - 11.0 10e3/uL    RBC Count 4.76 4.40 - 5.90 10e6/uL    Hemoglobin 13.3 13.3 - 17.7 g/dL    Hematocrit 40.8 40.0 - 53.0 %    MCV 86 78 - 100 fL    MCH 27.9 26.5 - 33.0 pg    MCHC 32.6 31.5 - 36.5 g/dL    RDW 13.1 10.0 - 15.0 %    Platelet Count 248 150 - 450 10e3/uL   Lactic acid whole blood   Result Value Ref Range    Lactic Acid 3.7 (H) 0.7 - 2.0 mmol/L   Lactic acid whole blood   Result Value Ref Range    Lactic Acid 1.0 0.7 - 2.0 mmol/L   Basic metabolic panel   Result Value Ref Range    Sodium 136 133 - 144 mmol/L    Potassium 3.7 3.4 - 5.3 mmol/L    Chloride 106 94 - 109 mmol/L    Carbon Dioxide (CO2) 22 20 - 32 mmol/L    Anion Gap 8 3 - 14 mmol/L    Urea Nitrogen 14 7 - 30 mg/dL    Creatinine 1.20 0.66 - 1.25 mg/dL    Calcium 8.3 (L) 8.5 - 10.1 mg/dL    Glucose 89 70 - 99 mg/dL    GFR Estimate 65 >60 mL/min/1.73m2   CBC with platelets   Result Value Ref Range    WBC Count 11.2 (H) 4.0 - 11.0 10e3/uL    RBC Count 4.06 (L) 4.40 - 5.90 10e6/uL    Hemoglobin 11.3 (L) 13.3 - 17.7 g/dL    Hematocrit 34.2 (L) 40.0 - 53.0 %    MCV 84 78 - 100 fL    MCH 27.8 26.5 - 33.0 pg    MCHC 33.0 31.5 - 36.5 g/dL    RDW 13.2 10.0 - 15.0 %    Platelet Count 211 150 - 450 10e3/uL

## 2021-09-23 NOTE — PROGRESS NOTES
"  Interventional Radiology - Progress Note  Inpatient - [facility]  9/23/2021    S:  Minimal abdominal pain after drain placement yesterday. Patient developed rigors, fever after drain placement. RRT called. BC drawn. Fever has since resolved and patient stating he feels very well.     Culture:  From abd fluid:  Gram Stain Result  No organisms seen      4+ WBC seen        Antibiotic: ceftriaxone, metronidazole  Flushing: 10mL sterile saline towards patient q8h, subtact flush from output totals.    O:  BP (!) 145/81 (BP Location: Left arm)   Pulse 67   Temp 98.1  F (36.7  C) (Oral)   Resp 16   Ht 1.803 m (5' 11\")   Wt 91.1 kg (200 lb 12.8 oz)   SpO2 96%   BMI 28.01 kg/m    General:  Stable.  In no acute distress.    Neuro:  A&O x 3. Moves all extremities equally.  Heart: RRR  Lungs: No increased work of breathing  Abd: Pelvic drain with bloody output. Minimal tenderness, dressing c/d/i      Labs (Last four results)  CMPRecent Labs   Lab 09/23/21  0622 09/22/21  1628 09/22/21  1626 09/22/21  1609 09/22/21  1002 09/21/21  2143 09/21/21  2143   POTASSIUM 3.7 3.8  --   --  4.1  --  3.8   GLC 89 81 76 68* 95   < > 105*   BUN 14 15  --   --  16  --  17   CR 1.20 1.31*  --   --  1.26*  --  1.53*   GFRESTIMATED 65 58*  --   --  61  --  48*    < > = values in this interval not displayed.     CBC  Recent Labs   Lab 09/23/21  1012 09/23/21  0622 09/22/21  1628 09/21/21  2143   WBC 11.2* 11.2* 6.9 5.2   RBC 4.10* 4.06* 4.76 4.85   HGB 11.7* 11.3* 13.3 13.6   HCT 34.7* 34.2* 40.8 40.8    211 248 261     INR  Recent Labs   Lab 09/22/21  1002   INR 1.23*         Drain Outputs (in mL):  9/22 90   9/23 5*   *as of 1226    A:  Diverticulitis with abscess s/p CT guided drain placement 9/22/21    P:    Continue to flush drain as above  New imaging when output reaches 20mL/day    Rafael Prabhakar PA-C  Interventional Radiology  *[id number] [phone]      Total time spent on the date of the encounter is 20 " minutes, including time spent counseling the patient, performing a medically appropriate evaluation, reviewing prior medical history, ordering medications and tests, documenting clinical information in the medical record, and communication of results.

## 2021-09-23 NOTE — PROGRESS NOTES
COLON & RECTAL SURGERY  PROGRESS NOTE    September 23, 2021    SUBJECTIVE: RRT called for rigors after IR drain placement yesterday. Feeling better overall today. No further fevers. Minimal abdominal pain aside from drain site. 95cc HAYDEE output. Passing flatus, had a loose BM last night. Tolerating clear liquids. Denies nausea/vomiting. WBC 11.2 today from 6.9. Hgb 11.3 from 13.3. Lactate improved to 1.0 last evening from 3.7. Cr 1.2, improving. Vitals stable, afebrile.    OBJECTIVE:  Temp:  [97.6  F (36.4  C)-103.2  F (39.6  C)] 98.9  F (37.2  C)  Pulse:  [] 72  Resp:  [16-22] 18  BP: (103-173)/(50-89) 147/76  SpO2:  [94 %-99 %] 96 %    Intake/Output Summary (Last 24 hours) at 9/23/2021 0928  Last data filed at 9/23/2021 0900  Gross per 24 hour   Intake 1704 ml   Output 1745 ml   Net -41 ml       GENERAL:  Awake, alert, no acute distress  HEAD: Normocephalic atraumatic  SCLERA: Anicteric  EXTREMITIES: Warm and well perfused  ABDOMEN:  Soft, non-tender, non-distended. No guarding, rigidity, or peritoneal signs. HAYDEE drain in place with bloody drainage.    LABS:  Lab Results   Component Value Date    WBC 11.2 09/23/2021     Lab Results   Component Value Date    HGB 11.3 09/23/2021     Lab Results   Component Value Date    HCT 34.2 09/23/2021     Lab Results   Component Value Date     09/23/2021     Last Basic Metabolic Panel:  Lab Results   Component Value Date     09/23/2021      Lab Results   Component Value Date    POTASSIUM 3.7 09/23/2021     Lab Results   Component Value Date    CHLORIDE 106 09/23/2021     Lab Results   Component Value Date    BEREKET 8.3 09/23/2021     Lab Results   Component Value Date    CO2 22 09/23/2021     Lab Results   Component Value Date    BUN 14 09/23/2021     Lab Results   Component Value Date    CR 1.20 09/23/2021     Lab Results   Component Value Date    GLC 89 09/23/2021       ASSESSMENT/PLAN: 61-year-old male admitted with likely complicated sigmoid diverticulitis with  abscess and possible developing hepatic abscess. Percutaneous drain placed yesterday. RRT for rigors yesterday, stable and feeling better today. Increased WBC, likely from drain placement. Having bowel function and tolerating clear liquids, ok for full liquids. No surgical intervention indicated at this time, recommend continuing conservative management. ID and vascular following.    1. Full liquid diet  2. PRN pain meds  3. Continue IVF  4. Abx per ID  5. Vascular following, recommending starting Xarelto at discharge for intrahepatic vein thrombosis  6. OOB, ambulate  7. CRS will continue to follow    Discussed with Dr. Catherine.    For questions/paging, please contact the CRS office at [phone].    Dean Alvarado PA-C  Colorectal Physician Assistant    [facility]  [address]  [place] [address]  T: [phone]  F: [phone]        [facility]  I performed a history and physical examination of the patient and discussed their management with the physician assistant. I reviewed the physician assistants note and agree with the documented findings and plan of care.     Agree with above.  Had chills and rigors after drainage procedure but denies any worsening abdominal pain.  + BM and flatus.  Overall feeling well.  Just some discomfort with drain.    Exam:   Alert, NAD  abd soft, NT, ND drain with sang output.    Plan:   Cont IV abx  Liquid diet  No surgical plans at this time  Recommend repeat imaging to evaluate if liver lesion has oragnized into abscess or resolved. Will also need sinogram for eval drain/abscess  Will need scope in 4-6 weeks  Follow-up with me in clinic      Claudia Catherine MD    [facility]  [address]  [place] [address]  T: [phone]  F: [phone]

## 2021-09-23 NOTE — PROGRESS NOTES
"[facility]    Hospitalist Progress Note    Assessment & Plan   Boom Daniel is a very pleasant 61 year old male with CKD stage 2, dyslipidemia, JARED on CPAP, tobacco abuse history who was admitted on 9/21/2021 with fever, chills and shortness of breath with findings of intra-abdominal infection including multiple abscesses of the abdomen, pelvis and liver.      Diverticulitis with multiple abscesses  Hepatic abscess  Sepsis secondary to intra-abdominal infection  MRSA history (in nasal swab but no known infection)  No prior history of diverticulosis or abdominal surgeries. Had \"normal\" colonoscopy around age 50 years and no repeat colonoscopy since. CT with several abnormal findings including several complex fluid collections near the sigmoid colon, abutting the bladder and within 2 liver lobes. Meets sepsis criteria with WBC 15,200 yesterday (prior to initiating antibiotic coverage with doxycycline for presumed pneumonia), tachycardic to 108, fever 103.2 F. Low albumin (2.7) but otherwise liver function tests are normal. Only recent travel was to [facility] last week when he played golf but denies any potential ingestion of unfiltered or contaminated water. Suspect the urinary urgency symptom may be from bladder irritation from abscess near the bladder. UA benign.   -Patient was for started with Zosyn, later it was transitioned to Rocephin and Flagyl by infectious disease.  -follow up blood cultures   -Was seen by IR and drain was placed on 9/22, no drain was placed on the hepatic abscess due to the small size of it, might need MRI of the liver down the road to make sure her abscesses are resolving.  -Patient seen by colorectal surgery and infectious disease.  Plan is to continue with the conservative management and outpatient follow-up for future sigmoid surgery.  -pain control: prn acetaminophen, IV/po dilaudid  -antipyretics: prn acetaminophen, ibuprofen, cold packs  -antiemetics: " prn zofran, compazine  -Diet as per colorectal surgery.   nonocclusive thrombosis of the hepatic vein  --Related to the hepatic abscess/phlegmon, seen by vascular medicine, they recommended anticoagulation with apixaban, currently on heparin drip, please see recommendation to transition to p.o. apixaban possibly in a day or 2 if no further procedure planned.  -it is recommended that he be placed on Eliquis 10 mg PO BID for one week, then 5 mg PO BID thereafter.  -F/U with Lovely Kennedy PA-C one to two weeks after discharge. In 3 months, obtain CT venogram of the abdomen as well as a d.dimer and have him follow-up in our clinic to discuss possible cessation of anticoagulation.   CKD stage 2  Hyponatremia  Baseline GFR 60-70 and now is 48. Suspect some prerenal etiology with dehydration. Sodium 131 with normal baseline.   -Creatinine improved with hydration, continue gentle hydration.     Cough, shortness of breath  Tobacco abuse history  Chest CT negative for PE or consolidation suggestive of infection. Does have emphysematous changes in upper lungs and given tobacco abuse may have some obstructive lung disease.   -prn oxygen as needed  -trial prn albuterol for wheezing/shortness of breath     Pulmonary nodule, right middle lobe  Solitary pulmonary nodule 4 mm noted as incidental finding of Chest/abdomen/pelvis CT.   -repeat chest CT with contrast in 12 months for reevaluation, sooner if concerning symptoms arise     Chronic, stable problems:  JARED: Resume PTA CPAP per home settings   Dyslipidemia: Hold statin for now    DVT Prophylaxis: heparin drip  Code Status: Full Code     Disposition: Expected discharge in 2 days     Diana Childress MD  Text Page   (7am to 6pm)    Interval History   Is resting comfortably, pain is much better controlled, he had shakes and chills yesterday night and had a tmax of 103.    -Data reviewed today: I reviewed all new labs and imaging results over the last 24 hours.   Physical Exam    Temp: 98.9  F (37.2  C) Temp src: Oral BP: (!) 147/76 Pulse: 72   Resp: 18 SpO2: 96 % O2 Device: None (Room air) Oxygen Delivery: 1 LPM  Vitals:    09/22/21 0253   Weight: 91.1 kg (200 lb 12.8 oz)     Vital Signs with Ranges  Temp:  [97.6  F (36.4  C)-103.2  F (39.6  C)] 98.9  F (37.2  C)  Pulse:  [] 72  Resp:  [16-22] 18  BP: (103-173)/(50-89) 147/76  SpO2:  [94 %-99 %] 96 %  I/O last 3 completed shifts:  In: 1704 [I.V.:1704]  Out: 1495 [Urine:1400; Drains:95]    Constitutional:Awake, alert, cooperative, no apparent distress  Respiratory: Clear to auscultation bilaterally, no crackles or wheezing  Cardiovascular: Regular rate and rhythm, normal S1 and S2, and no murmur noted  GI: Normal bowel sounds, soft, distended, drain noted, bloody discharge noted in the drain.  Skin/Integumen: No rashes, no cyanosis, no edema  Neuro : moving all 4 extremities, no focal deficit noted     Medications     lactated ringers 150 mL/hr at 09/23/21 0443       apixaban ANTICOAGULANT  10 mg Oral BID     [START ON 9/29/2021] apixaban ANTICOAGULANT  5 mg Oral BID     cefTRIAXone  2 g Intravenous Q24H     meperidine  25 mg Intravenous Once     metroNIDAZOLE  500 mg Oral BID     sodium chloride (PF)  10 mL Irrigation Q8H     sodium chloride (PF)  3 mL Intracatheter Q8H       Data   Recent Labs   Lab 09/23/21  0622 09/22/21  1628 09/22/21  1626 09/22/21  1609 09/22/21  1002 09/21/21  2143 09/21/21  2143 09/20/21  1239 09/20/21  1239   WBC 11.2* 6.9  --   --   --   --  5.2   < > 15.2*   HGB 11.3* 13.3  --   --   --   --  13.6   < > 13.4   MCV 84 86  --   --   --   --  84   < > 85    248  --   --   --   --  261   < > 271   INR  --   --   --   --  1.23*  --   --   --   --     140  --   --  140   < > 131*   < > 131*   POTASSIUM 3.7 3.8  --   --  4.1   < > 3.8   < > 4.0   CHLORIDE 106 108  --   --  111*   < > 100   < > 102   CO2 22 19*  --   --  20   < > 19*   < > 21   BUN 14 15  --   --  16   < > 17   < > 18   CR 1.20 1.31*   --   --  1.26*   < > 1.53*   < > 1.53*   ANIONGAP 8 13  --   --  9   < > 12   < > 8   BEREKET 8.3* 8.4*  --   --  7.9*   < > 8.4*   < > 8.7   GLC 89 81 76   < > 95   < > 105*   < > 119*   ALBUMIN  --  2.5*  --   --   --   --  2.7*  --   --    PROTTOTAL  --  7.2  --   --   --   --  7.5  --   --    BILITOTAL  --  1.0  --   --   --   --  0.7  --   --    ALKPHOS  --  131  --   --   --   --  148  --   --    ALT  --  44  --   --   --   --  53  --   --    AST  --  27  --   --   --   --  35  --   --    TROPONIN  --   --   --   --   --   --  <0.015  --  <0.015    < > = values in this interval not displayed.     Recent Labs   Lab 09/23/21  0622 09/22/21  1628 09/22/21  1626 09/22/21  1609 09/22/21  1002   GLC 89 81 76 68* 95       Imaging:   Recent Results (from the past 24 hour(s))   CT Abdomen Peritonium Abscess Drainage    Narrative    CT ABDOMEN PERITONEUM ABSCESS DRAIN WITH CATHETER PLACE  9/22/2021  2:34 PM     HISTORY: Liver abscess, multiple colon abscesses. Percutaneous  drainage requested by primary service.    COMPARISON: None.     TECHNIQUE: Volumetric helical acquisition of CT images were acquired  without contrast. Radiation dose for this scan was reduced using  automated exposure control, adjustment of the mA and/or kV according  to patient size, or iterative reconstruction technique.    MEDICATIONS:  2mg versed, 100mcg fentanyl given by RORY Manrique;  10ml  lidocaine given by Dr Deleon    FINDINGS: After written and oral informed consent was obtained, and  pause for the cause correctly identified the patient and procedure,  the patient was scanned in supine position for procedural planning.  The subcutaneous tissues overlying the fluid collection in the pelvis  was identified, marked, prepped and draped in the usual sterile  fashion, and anesthetized with 10 mL of 1% subcutaneous lidocaine. The  fluid collection was accessed with a hollow bore needle, and purulent  material was aspirated. An 0.035 floppy  tipped wire was advanced into  the fluid collection and the needle was removed over the wire. The  tract was serially dilated.  A 10 Croatian locking pigtail catheter was  advanced into the abscess and its position was verified with  postprocedural imaging. The catheter was fixed to the overlying skin  using an adhesive bandage. There were no immediate complications and  patient left the CT suite in satisfactory condition.     CONSCIOUS SEDATION NOTE: After obtaining consent for sedation,  conscious sedation was induced using IV Versed and IV fentanyl.  Patient was monitored by nurse under my direct supervision throughout  the exam. There were no complications of the sedation. 15 minutes  face-to-face time.      Impression    IMPRESSION:   1. Technically successful CT guided abscess drain placement.   2. Conscious sedation.

## 2021-09-23 NOTE — PLAN OF CARE
Contact precautions in place. A&Ox4, VSS, afebrile on RA. Abdominal dressing c/d/I, HAYDEE to bulb suction, irrigating q 8hr, output red bloody drainage. Some abdominal discomfort with transfer out of bed, no pain med intervention needed. BS active, 1x BM. Terry clear liquid. IV fluids @ 150ml/hr. Urine output adequate. SBA. ID, Vascular, and colorectal surgery following. Will continue to monitor.

## 2021-09-23 NOTE — PROGRESS NOTES
[facility]    Infectious Disease Progress Note    Date of [id number]/23/2021     Assessment:  1. Perforated sigmoid diverticulitis with neena diverticular abscess between sigmoid colon and bladder. S/p IR drain placement with purulent drainage  2. Right hepatic lobe phlegmon with developing abscess. Additional lesion noted in the left hepatic lobe which will need to be followed. He had another fever yesterday, may need further MRI imaging if fevers persist  3. Fever/ leukocytosis   4. Partially thrombosed intrahepatic vein in the right hepatic lobe  5. Incidental note of pulmonary nodule which will need follow up.  6. Tobacco abuse hitory and pulmonary emphysema  7. CKD with current creatinine of 1.53     Recommendations:  1. Continue Ceftriaxone/ Metronidazole  2. Follow blood cxs and abscess cultures  3. Monitor fever pattern. Will need follow up imaging at a later date to assess evolution of right hepatic phlegmon and left hepatic lobe lesion -may form abscesses and drainage may be required at a later date  4. No surgical plans at this time  ID will continue to follow.    Marlen Barnes MD    Interval History   Feels better. No fever today but did spike a fever last night. S/p IR drain placement. Tolerating antibiotics without side effects. No abdominal pain     Antimicrobial therapy  9/22 Ceftriaxone / Metronidazole  prior  9/22 Zosyn    Physical Exam   Temp: 98.1  F (36.7  C) Temp src: Oral BP: (!) 145/81 Pulse: 67   Resp: 16 SpO2: 96 % O2 Device: None (Room air) Oxygen Delivery: 1 LPM  Vitals:    09/22/21 0253   Weight: 91.1 kg (200 lb 12.8 oz)     Vital Signs with Ranges  Temp:  [98  F (36.7  C)-103.2  F (39.6  C)] 98.1  F (36.7  C)  Pulse:  [] 67  Resp:  [16-22] 16  BP: (103-173)/(56-89) 145/81  SpO2:  [94 %-99 %] 96 %    Constitutional: Awake, alert, cooperative, no apparent distress  Lungs: Clear to auscultation bilaterally, no crackles or wheezing  Cardiovascular: Regular rate  and rhythm, normal S1 and S2, and no murmur noted  Abdomen: Normal bowel sounds, soft, non-distended, non-tender  Skin: No rashes, no cyanosis, no edema  Other:    Medications     heparin 1,640 Units/hr (09/23/21 1255)     lactated ringers 150 mL/hr at 09/23/21 1254       cefTRIAXone  2 g Intravenous Q24H     meperidine  25 mg Intravenous Once     metroNIDAZOLE  500 mg Oral BID     sodium chloride (PF)  10 mL Irrigation Q8H     sodium chloride (PF)  3 mL Intracatheter Q8H       Data   All microbiology laboratory data reviewed.  Recent Labs   Lab Test 09/23/21  1012 09/23/21  0622 09/22/21  1628   WBC 11.2* 11.2* 6.9   HGB 11.7* 11.3* 13.3   HCT 34.7* 34.2* 40.8   MCV 85 84 86    211 248     Recent Labs   Lab Test 09/23/21  0622 09/22/21  1628 09/22/21  1002   CR 1.20 1.31* 1.26*     Micro  Abdominal fluid cx 9/22 pending  Blood cx 9/21 one set and 9/22 one set pending     imaging  9/22 CT chest abdomen pelvis  EXAM: CT CHEST PE ABDOMEN PELVIS W CONTRAST  LOCATION: [facility]  DATE/TIME: 9/22/2021 12:26 AM     INDICATION: Shortness of breath and cough. Fever, diarrhea, abdominal pain.  COMPARISON: None.  TECHNIQUE: CT chest pulmonary angiogram and routine CT abdomen pelvis with IV contrast. Arterial phase through the chest and venous phase through the abdomen and pelvis. Multiplanar reformats and MIP reconstructions were performed. Dose reduction   techniques were used.   CONTRAST: 99 mL [id number]     FINDINGS:  ANGIOGRAM CHEST: Pulmonary arteries are normal caliber and negative for pulmonary emboli. Thoracic aorta is negative for dissection. No CT evidence of right heart strain.      LUNGS AND PLEURA: No acute infiltrates or consolidation. Mild dependent atelectasis in the lower lungs. Mild paraseptal emphysematous changes in the upper lungs. Indeterminate 4 mm nodule right middle lobe on series 6 image 150. No pleural effusions.     MEDIASTINUM/AXILLAE: No adenopathy. Normal heart  size. No pericardial effusion. Normal caliber thoracic aorta. Small hiatal hernia. Axillary regions are unremarkable.     CORONARY ARTERY CALCIFICATION: None.     HEPATOBILIARY: There is an indeterminate heterogeneous low-attenuation lesion with ill-defined borders in the right hepatic lobe posteriorly measuring approximately 4.8 x 4.7 x 3.9 cm. Given the findings described below, this is concerning for potential   developing phlegmonous reaction/abscess within the liver. There is an adjacent small tubular shaped low-density structure seen adjacent to this process which could represent a partially thrombosed hepatic vein. Additional indeterminate 1.3 cm   low-attenuation lesion in the left hepatic lobe superiorly on image 21. No calcified gallstones or biliary dilatation.     PANCREAS: Normal.     SPLEEN: Normal.     ADRENAL GLANDS: Normal.     KIDNEYS/BLADDER: Normal.     BOWEL: Sigmoid diverticulosis. There appears to be some mild low-density thickening in the wall of the sigmoid colon. Immediately anterior to the sigmoid colon in the lower pelvis anteriorly, there is a complex peripherally enhancing fluid collection   with mildly thickened walls measuring up to 5.0 cm in diameter. This is seen situated just superior to the bladder and is most suggestive of an abscess collection. Additional 3.5 cm less well-circumscribed fluid collection adjacent to the sigmoid colon   on image 154 may represent an additional developing abscess. Findings would be most suggestive of diverticulitis with peridiverticular abscesses. No free air and no evidence for bowel obstruction. Appendix is normal. Close imaging follow-up is   recommended to monitor for resolution and exclude any underlying lesions.     LYMPH NODES: Normal.     VASCULATURE: Aortic calcification without aneurysm.     PELVIC ORGANS: Normal.     MUSCULOSKELETAL: No significant bony findings. Benign-appearing area of sclerosis in the left supra-acetabular region  likely represents a benign bone island. Degenerative changes both hips.                                                                      IMPRESSION:  1.  Negative for pulmonary embolism. No acute findings in the chest.     2.  Technically indeterminate 4 mm pulmonary nodule in the right middle lobe. Recommend follow-up CT in one year for reevaluation.     3.  Mild paraseptal emphysematous changes in the upper lungs.     4.  Sigmoid diverticulosis with mild wall thickening. Complex fluid collections are seen in the lower pelvis anteriorly situated between the sigmoid colon and superior aspect of the bladder with the largest collection measuring up to 5.0 cm. These are   concerning for abscesses and constellation of findings is most suggestive of diverticulitis with peridiverticular abscesses. Clinical correlation. Close imaging follow-up is recommended to monitor for resolution of these findings and exclude any   underlying lesions.     5.  Indeterminate heterogeneous low-attenuation lesion with ill-defined margins in the right hepatic lobe measuring up to 4.8 cm in diameter. This lesion is indeterminate but concerning for an area of phlegmonous change and potential developing abscess   within the liver. Additional indeterminate 1.3 cm low-attenuation lesion left hepatic lobe. MRI could be considered in further evaluation. Otherwise close follow-up in the liver is recommended to exclude any underlying lesion or progressive process.     6.  Small tubular shaped low-density structure seen in the right hepatic lobe is suggestive of a partially thrombosed intrahepatic vein.

## 2021-09-23 NOTE — PLAN OF CARE
Contact precautions for MRSA. A&Ox4, VSS.  Abdominal dressing c/d/I, HAYDEE to bulb suction, irrigating q 8hr, output red bloody drainage. Denies abdominal discomfort, no pain med intervention needed. BS active, 1x BM.Full liquid diet. IV fluids @ 150ml/hr. IV Heparin @16.5 ml/hr. Next PTT is at 16:48. Urine output adequate. SBA.. Will continue to monitor.

## 2021-09-24 ENCOUNTER — APPOINTMENT (OUTPATIENT)
Dept: ULTRASOUND IMAGING | Facility: CLINIC | Age: 61
End: 2021-09-24
Attending: SPECIALIST
Payer: COMMERCIAL

## 2021-09-24 LAB
ANION GAP SERPL CALCULATED.3IONS-SCNC: 9 MMOL/L (ref 3–14)
APTT PPP: 70 SECONDS (ref 22–38)
BASOPHILS # BLD AUTO: 0 10E3/UL (ref 0–0.2)
BASOPHILS NFR BLD AUTO: 0 %
BUN SERPL-MCNC: 12 MG/DL (ref 7–30)
CALCIUM SERPL-MCNC: 8.6 MG/DL (ref 8.5–10.1)
CHLORIDE BLD-SCNC: 105 MMOL/L (ref 94–109)
CO2 SERPL-SCNC: 23 MMOL/L (ref 20–32)
CREAT SERPL-MCNC: 1.16 MG/DL (ref 0.66–1.25)
EOSINOPHIL # BLD AUTO: 0.1 10E3/UL (ref 0–0.7)
EOSINOPHIL NFR BLD AUTO: 1 %
ERYTHROCYTE [DISTWIDTH] IN BLOOD BY AUTOMATED COUNT: 13.2 % (ref 10–15)
GFR SERPL CREATININE-BSD FRML MDRD: 68 ML/MIN/1.73M2
GLUCOSE BLD-MCNC: 90 MG/DL (ref 70–99)
GRAM STAIN RESULT: ABNORMAL
GRAM STAIN RESULT: ABNORMAL
HCT VFR BLD AUTO: 37.9 % (ref 40–53)
HGB BLD-MCNC: 12.7 G/DL (ref 13.3–17.7)
IMM GRANULOCYTES # BLD: 0.1 10E3/UL
IMM GRANULOCYTES NFR BLD: 1 %
LYMPHOCYTES # BLD AUTO: 1.2 10E3/UL (ref 0.8–5.3)
LYMPHOCYTES NFR BLD AUTO: 13 %
MCH RBC QN AUTO: 27.7 PG (ref 26.5–33)
MCHC RBC AUTO-ENTMCNC: 33.5 G/DL (ref 31.5–36.5)
MCV RBC AUTO: 83 FL (ref 78–100)
MONOCYTES # BLD AUTO: 0.8 10E3/UL (ref 0–1.3)
MONOCYTES NFR BLD AUTO: 9 %
NEUTROPHILS # BLD AUTO: 7 10E3/UL (ref 1.6–8.3)
NEUTROPHILS NFR BLD AUTO: 76 %
NRBC # BLD AUTO: 0 10E3/UL
NRBC BLD AUTO-RTO: 0 /100
PLATELET # BLD AUTO: 263 10E3/UL (ref 150–450)
POTASSIUM BLD-SCNC: 3.7 MMOL/L (ref 3.4–5.3)
RBC # BLD AUTO: 4.58 10E6/UL (ref 4.4–5.9)
SODIUM SERPL-SCNC: 137 MMOL/L (ref 133–144)
WBC # BLD AUTO: 9.2 10E3/UL (ref 4–11)

## 2021-09-24 PROCEDURE — 76705 ECHO EXAM OF ABDOMEN: CPT

## 2021-09-24 PROCEDURE — 99232 SBSQ HOSP IP/OBS MODERATE 35: CPT | Performed by: INTERNAL MEDICINE

## 2021-09-24 PROCEDURE — 250N000013 HC RX MED GY IP 250 OP 250 PS 637: Performed by: SPECIALIST

## 2021-09-24 PROCEDURE — 80048 BASIC METABOLIC PNL TOTAL CA: CPT | Performed by: INTERNAL MEDICINE

## 2021-09-24 PROCEDURE — 120N000004 HC R&B MS OVERFLOW

## 2021-09-24 PROCEDURE — 258N000003 HC RX IP 258 OP 636: Performed by: NURSE PRACTITIONER

## 2021-09-24 PROCEDURE — 36415 COLL VENOUS BLD VENIPUNCTURE: CPT | Performed by: INTERNAL MEDICINE

## 2021-09-24 PROCEDURE — 250N000011 HC RX IP 250 OP 636: Performed by: SPECIALIST

## 2021-09-24 PROCEDURE — 250N000011 HC RX IP 250 OP 636

## 2021-09-24 PROCEDURE — 99233 SBSQ HOSP IP/OBS HIGH 50: CPT | Performed by: INTERNAL MEDICINE

## 2021-09-24 PROCEDURE — 85730 THROMBOPLASTIN TIME PARTIAL: CPT | Performed by: INTERNAL MEDICINE

## 2021-09-24 PROCEDURE — 250N000013 HC RX MED GY IP 250 OP 250 PS 637: Performed by: INTERNAL MEDICINE

## 2021-09-24 PROCEDURE — 85004 AUTOMATED DIFF WBC COUNT: CPT | Performed by: INTERNAL MEDICINE

## 2021-09-24 RX ADMIN — METRONIDAZOLE 500 MG: 500 TABLET ORAL at 08:52

## 2021-09-24 RX ADMIN — SODIUM CHLORIDE, POTASSIUM CHLORIDE, SODIUM LACTATE AND CALCIUM CHLORIDE: 600; 310; 30; 20 INJECTION, SOLUTION INTRAVENOUS at 08:52

## 2021-09-24 RX ADMIN — CEFTRIAXONE SODIUM 2 G: 2 INJECTION, POWDER, FOR SOLUTION INTRAMUSCULAR; INTRAVENOUS at 08:52

## 2021-09-24 RX ADMIN — APIXABAN 10 MG: 5 TABLET, FILM COATED ORAL at 20:25

## 2021-09-24 RX ADMIN — SODIUM CHLORIDE, POTASSIUM CHLORIDE, SODIUM LACTATE AND CALCIUM CHLORIDE: 600; 310; 30; 20 INJECTION, SOLUTION INTRAVENOUS at 20:32

## 2021-09-24 RX ADMIN — HEPARIN SODIUM 1650 UNITS/HR: 10000 INJECTION, SOLUTION INTRAVENOUS at 01:14

## 2021-09-24 RX ADMIN — APIXABAN 10 MG: 5 TABLET, FILM COATED ORAL at 11:58

## 2021-09-24 RX ADMIN — SODIUM CHLORIDE, POTASSIUM CHLORIDE, SODIUM LACTATE AND CALCIUM CHLORIDE: 600; 310; 30; 20 INJECTION, SOLUTION INTRAVENOUS at 02:02

## 2021-09-24 RX ADMIN — METRONIDAZOLE 500 MG: 500 TABLET ORAL at 20:25

## 2021-09-24 ASSESSMENT — ACTIVITIES OF DAILY LIVING (ADL)
ADLS_ACUITY_SCORE: 5
ADLS_ACUITY_SCORE: 5
ADLS_ACUITY_SCORE: 6

## 2021-09-24 NOTE — PROGRESS NOTES
[facility]    Medicine Progress Note - Hospitalist Service       Date of Admission:  9/21/2021    Assessment & Plan   Boom Daniel is a 61 year old male with hx of JARED and former tobacco use who was admitted on 9/21/2021 for sepsis due to perforated diverticulitis with abscesses, and a hepatic phlegmon. He was also found to have a non-occlusive thrombus of the hepatic vein during this hospitalization    Sepsis due to perforated sigmoid diverticulitis with multiple neena-diverticular abscesses and hepatic phlegmon, s/p CT-guided drain placement to intra-abdominal abscess on 9/22/21  History of MRSA (nasal swab)  Presented with fever, cough, and shortness of breath. Fever to 103.2 and leukocytosis (15.2) present on arrival. CXR on arrival was clear. CT chest/abd/pelvis on arrival showed sigmoid diverticulosis with mild wall thickening, multiple complex fluid collections in the lower pelvis concerning for abscesses, and 4.8 cm lesion in the liver suggestive of evolving abscess. In the ED, he was initiated on IV pip-tazo, which was changed to ceftriaxone and metronidazole per ID upon admission. IR was consulted, and patient underwent CT-guided drain placement on 9/22. Hepatic abscess could not be drained due to its small size. Colorectal Surgery was also consulted, and recommended medical management.   - HAYDEE drain in place; care instructions included with AVS  - Continues on ceftriaxone and metronidazole as per ID  - Repeat CT abd/pelvis and CT sinogram on 9/30  - ID and CRS following  Addendum: Discussed with ID; patient would otherwise be able to discharge, but liver lesion needs to be reassessed; if it is an abscess, recommended treatment would be IV antibiotics. Would favor keeping the patient one more night for further evaluation of liver. Will discuss with patient    Non-occlusive thrombus of the right hepatic vein   Noted on admission CT, likely related to hepatic abscess. On admission, he  was initiated on IV heparin. Vascular Medicine was consulted, and recommended apixaban   - He will be discharged with apixaban 10 mg BID x1 week then 5 mg BID  - He will [facility] up with [facility] 1[facility]2 weeks after discharge with CT venogram in 3 months    CHANA on CKD stage II, Resolved  Creatinine up to 1.53 from baseline 1.1-1.2. Resolved with IV fluids    Hyponatremia, Resolved  Na 131 on admission. Resolved with IV fluids     Possible COPD  Presented with cough and shortness of breath with no acute findings on CXR or CT. Emphysematous changes were noted, and given smoking history, likely has some degree of COPD  - Recommend outpatient PFTs    RML pulmonary nodule, incidental finding  Solitary pulmonary nodule 4 mm incidentally noted on admission CT  - Repeat chest CT w/contrast in 12 months or sooner if concerning symptoms arise    Essential hypertension  - Resume PTA losartan at discharge    JARED  Chronic and stable on CPAP     Diet: Low Fiber Diet    DVT Prophylaxis: DOAC  Rausch Catheter: Not present  Code Status: Full Code         Disposition: Expected discharge today or tomorrow once cleared by ID    The patient's care was discussed with the Patient.    Alma Delia Toney MD  Hospitalist Service  [facility]  Contact information available via [facility] Paging/Directory    ______________________________________________________________________    Interval History   No events overnight. Feels better; abdominal pain resolved. Denies cp/sob, dizziness/lightheadedness, or nausea.     Data reviewed today: I reviewed all medications, new labs and imaging results over the last 24 hours. I personally reviewed no images or EKG's today.    Physical Exam   Vital Signs: Temp: 97.1  F (36.2  C) Temp src: Oral BP: (!) 155/92 Pulse: 64   Resp: 14 SpO2: 96 % O2 Device: None (Room air)    Weight: 200 lbs 12.8 oz  Constitutional: Resting comfortably, NAD  HEENT: Sclera  white, MMM  Respiratory: Breathing non-labored. Lungs CTAB - no wheezes, crackles, or rhonchi  Cardiovascular: Heart RRR, no m/r/g. No pedal edema  GI: +BS, abd soft/NT  Skin/Integument: No rash  Musculoskeletal: Normal muscle bulk and tone  Neuro: Alert and appropriate, SANTOS  Psych: Calm and cooperative    Data   Recent Labs   Lab 09/24/21  0538 09/23/21  1012 09/23/21  0622 09/22/21  1628 09/22/21  1628 09/22/21  1609 09/22/21  1002 09/21/21  2143 09/21/21  2143 09/20/21  1239 09/20/21  1239   WBC 9.2 11.2* 11.2*   < > 6.9  --   --    < > 5.2   < > 15.2*   HGB 12.7* 11.7* 11.3*   < > 13.3  --   --    < > 13.6   < > 13.4   MCV 83 85 84   < > 86  --   --    < > 84   < > 85    214 211   < > 248  --   --    < > 261   < > 271   INR  --   --   --   --   --   --  1.23*  --   --   --   --      --  136  --  140  --  140   < > 131*   < > 131*   POTASSIUM 3.7  --  3.7  --  3.8  --  4.1   < > 3.8   < > 4.0   CHLORIDE 105  --  106  --  108  --  111*   < > 100   < > 102   CO2 23  --  22  --  19*  --  20   < > 19*   < > 21   BUN 12  --  14  --  15  --  16   < > 17   < > 18   CR 1.16  --  1.20  --  1.31*  --  1.26*   < > 1.53*   < > 1.53*   ANIONGAP 9  --  8  --  13  --  9   < > 12   < > 8   BEREKET 8.6  --  8.3*  --  8.4*  --  7.9*   < > 8.4*   < > 8.7   GLC 90  --  89  --  81   < > 95   < > 105*   < > 119*   ALBUMIN  --   --   --   --  2.5*  --   --   --  2.7*  --   --    PROTTOTAL  --   --   --   --  7.2  --   --   --  7.5  --   --    BILITOTAL  --   --   --   --  1.0  --   --   --  0.7  --   --    ALKPHOS  --   --   --   --  131  --   --   --  148  --   --    ALT  --   --   --   --  44  --   --   --  53  --   --    AST  --   --   --   --  27  --   --   --  35  --   --    TROPONIN  --   --   --   --   --   --   --   --  <0.015  --  <0.015    < > = values in this interval not displayed.         No results found for this or any previous visit (from the past 24 hour(s)).    Medications     lactated ringers 150 mL/hr at  09/24/21 0852       apixaban ANTICOAGULANT  10 mg Oral BID    Followed by     [START ON 10/1/2021] apixaban ANTICOAGULANT  5 mg Oral BID     cefTRIAXone  2 g Intravenous Q24H     meperidine  25 mg Intravenous Once     metroNIDAZOLE  500 mg Oral BID     sodium chloride (PF)  10 mL Irrigation Q8H     sodium chloride (PF)  3 mL Intracatheter Q8H

## 2021-09-24 NOTE — PROGRESS NOTES
Admitted w fever, sob, diverticulitis, sepsis. Alert Ox4 Independent. Urinal. HAYDEE drain, irrigate q 8 hours, output serosanguinous. Monitor PTT, 75, heparin drip for hepatic duct occlusion. VSS, bp slightly elevated. Discharge today.

## 2021-09-24 NOTE — PROVIDER NOTIFICATION
Paged Dr Alma Delia Toney about abd critical lab value for abd aspirate collected on 09/22 with 3+ gram negative bacilli. Pt to stay overnight today.

## 2021-09-24 NOTE — PROGRESS NOTES
[facility]    Infectious Disease Progress Note    Date of Service : 09/24/2021     Assessment:  1. Perforated sigmoid diverticulitis with neena diverticular abscess between sigmoid colon and bladder. S/p IR drain placement with purulent drainage, no growth on culture thus far  2. Right hepatic lobe phlegmon with possible developing abscess. Additional lesion noted in the left hepatic lobe which will need to be followed.   3. Fever/ leukocytosis resolved  4. Partially thrombosed intrahepatic vein in the right hepatic lobe  5. Incidental note of pulmonary nodule which will need follow up.  6. Tobacco abuse hitory and pulmonary emphysema  7. CKD with current creatinine of 1.53     Recommendations:  1. check US liver to assess for abscess and whether further drainage is required  2. Follow blood cxs and abscess cultures  3. if no further drainable focus, discharge on Augmentin 875 mg PO BID with close clinical FUP with repeat imaging in 1 week. Please give prescription for 4 weeks at discharge  4. No surgical plans at this time    Schedule ID FUP in 2 weeks      Marlen Barnes MD    Interval History   Feels ok , worried about hospital cost. Wants to be discharged today.    Antimicrobial therapy  9/22 Ceftriaxone / Metronidazole  prior  9/22 Zosyn    Physical Exam   Temp: 97.1  F (36.2  C) Temp src: Oral BP: (!) 155/92 Pulse: 64   Resp: 14 SpO2: 96 % O2 Device: None (Room air)    Vitals:    09/22/21 0253   Weight: 91.1 kg (200 lb 12.8 oz)     Vital Signs with Ranges  Temp:  [97.1  F (36.2  C)-97.9  F (36.6  C)] 97.1  F (36.2  C)  Pulse:  [64-78] 64  Resp:  [14-17] 14  BP: (138-155)/(76-92) 155/92  SpO2:  [95 %-96 %] 96 %    Constitutional: Awake, alert, cooperative, no apparent distress  Lungs: Clear to auscultation bilaterally, no crackles or wheezing  Cardiovascular: Regular rate and rhythm, normal S1 and S2, and no murmur noted  Abdomen: Normal bowel sounds, soft, non-distended, non-tender  Skin:  No rashes, no cyanosis, no edema  Other:    Medications     lactated ringers 150 mL/hr at 09/24/21 0852       apixaban ANTICOAGULANT  10 mg Oral BID    Followed by     [START ON 10/1/2021] apixaban ANTICOAGULANT  5 mg Oral BID     cefTRIAXone  2 g Intravenous Q24H     meperidine  25 mg Intravenous Once     metroNIDAZOLE  500 mg Oral BID     sodium chloride (PF)  10 mL Irrigation Q8H     sodium chloride (PF)  3 mL Intracatheter Q8H       Data   All microbiology laboratory data reviewed.  Recent Labs   Lab Test 09/24/21  0538 09/23/21  1012 09/23/21  0622   WBC 9.2 11.2* 11.2*   HGB 12.7* 11.7* 11.3*   HCT 37.9* 34.7* 34.2*   MCV 83 85 84    214 211     Recent Labs   Lab Test 09/24/21  0538 09/23/21  0622 09/22/21  1628   CR 1.16 1.20 1.31*       Micro  Abdominal fluid cx 9/22 pending  Blood cx 9/21 one set and 9/22 one set pending     imaging  9/22 CT chest abdomen pelvis  EXAM: CT CHEST PE ABDOMEN PELVIS W CONTRAST  LOCATION: [facility]  DATE/TIME: 9/22/2021 12:26 AM     INDICATION: Shortness of breath and cough. Fever, diarrhea, abdominal pain.  COMPARISON: None.  TECHNIQUE: CT chest pulmonary angiogram and routine CT abdomen pelvis with IV contrast. Arterial phase through the chest and venous phase through the abdomen and pelvis. Multiplanar reformats and MIP reconstructions were performed. Dose reduction   techniques were used.   CONTRAST: 99 mL [id number]     FINDINGS:  ANGIOGRAM CHEST: Pulmonary arteries are normal caliber and negative for pulmonary emboli. Thoracic aorta is negative for dissection. No CT evidence of right heart strain.      LUNGS AND PLEURA: No acute infiltrates or consolidation. Mild dependent atelectasis in the lower lungs. Mild paraseptal emphysematous changes in the upper lungs. Indeterminate 4 mm nodule right middle lobe on series 6 image 150. No pleural effusions.     MEDIASTINUM/AXILLAE: No adenopathy. Normal heart size. No pericardial effusion. Normal  caliber thoracic aorta. Small hiatal hernia. Axillary regions are unremarkable.     CORONARY ARTERY CALCIFICATION: None.     HEPATOBILIARY: There is an indeterminate heterogeneous low-attenuation lesion with ill-defined borders in the right hepatic lobe posteriorly measuring approximately 4.8 x 4.7 x 3.9 cm. Given the findings described below, this is concerning for potential   developing phlegmonous reaction/abscess within the liver. There is an adjacent small tubular shaped low-density structure seen adjacent to this process which could represent a partially thrombosed hepatic vein. Additional indeterminate 1.3 cm   low-attenuation lesion in the left hepatic lobe superiorly on image 21. No calcified gallstones or biliary dilatation.     PANCREAS: Normal.     SPLEEN: Normal.     ADRENAL GLANDS: Normal.     KIDNEYS/BLADDER: Normal.     BOWEL: Sigmoid diverticulosis. There appears to be some mild low-density thickening in the wall of the sigmoid colon. Immediately anterior to the sigmoid colon in the lower pelvis anteriorly, there is a complex peripherally enhancing fluid collection   with mildly thickened walls measuring up to 5.0 cm in diameter. This is seen situated just superior to the bladder and is most suggestive of an abscess collection. Additional 3.5 cm less well-circumscribed fluid collection adjacent to the sigmoid colon   on image 154 may represent an additional developing abscess. Findings would be most suggestive of diverticulitis with peridiverticular abscesses. No free air and no evidence for bowel obstruction. Appendix is normal. Close imaging follow-up is   recommended to monitor for resolution and exclude any underlying lesions.     LYMPH NODES: Normal.     VASCULATURE: Aortic calcification without aneurysm.     PELVIC ORGANS: Normal.     MUSCULOSKELETAL: No significant bony findings. Benign-appearing area of sclerosis in the left supra-acetabular region likely represents a benign bone island.  Degenerative changes both hips.                                                                      IMPRESSION:  1.  Negative for pulmonary embolism. No acute findings in the chest.     2.  Technically indeterminate 4 mm pulmonary nodule in the right middle lobe. Recommend follow-up CT in one year for reevaluation.     3.  Mild paraseptal emphysematous changes in the upper lungs.     4.  Sigmoid diverticulosis with mild wall thickening. Complex fluid collections are seen in the lower pelvis anteriorly situated between the sigmoid colon and superior aspect of the bladder with the largest collection measuring up to 5.0 cm. These are   concerning for abscesses and constellation of findings is most suggestive of diverticulitis with peridiverticular abscesses. Clinical correlation. Close imaging follow-up is recommended to monitor for resolution of these findings and exclude any   underlying lesions.     5.  Indeterminate heterogeneous low-attenuation lesion with ill-defined margins in the right hepatic lobe measuring up to 4.8 cm in diameter. This lesion is indeterminate but concerning for an area of phlegmonous change and potential developing abscess   within the liver. Additional indeterminate 1.3 cm low-attenuation lesion left hepatic lobe. MRI could be considered in further evaluation. Otherwise close follow-up in the liver is recommended to exclude any underlying lesion or progressive process.     6.  Small tubular shaped low-density structure seen in the right hepatic lobe is suggestive of a partially thrombosed intrahepatic vein.

## 2021-09-24 NOTE — PROGRESS NOTES
[facility]  Vascular Medicine Progress Note          Assessment and Plan:       Perforated sigmoid diverticulitis with neena-diverticular abscess  Developing right hepatic lobe abscess  Likely partially thrombosed intrahepatic vein in the right hepatic lobe     -S/P abdominal abscess drain placement in IR 09/22. Fevers 09/22 to 103.2F. ID is following and has him on antibiotics. Afebrile last 24 hours .    -D dimer was elevated, but this is not helpful in the setting of abscesses and diverticulitis. There is not a high risk of extension of thrombus in the intrahepatic vein currently.     -Eliquis started 09/22, but then had fevers as above. Switched to IV UFH for the time being in case emergent surgical procedures required.  He was placed on Eliquis 10 mg PO BID for one week, then 5 mg PO BID thereafter on 09/24. Orders placed in discharge navigator. Discharge per ID and hospitalist. Vascular Medicine will sign off. Please call us back at [phone] should our furhter input be of assistance.     -F/U with Lovely Kennedy PA-C one to two weeks after discharge. In 3 months, obtain CT venogram of the abdomen as well as a d dimer and have him follow-up in our clinic to discuss possible cessation of anticoagulation. Likely duration of AC will be three months.                Interval History:   doing well; no cp, sob, n/v/d, or abd pain currnetly. Fevers last night, now resolved.              Review of Systems:   The 10 point Review of Systems is negative other than noted in the HPI             Medications:       cefTRIAXone  2 g Intravenous Q24H     meperidine  25 mg Intravenous Once     metroNIDAZOLE  500 mg Oral BID     sodium chloride (PF)  10 mL Irrigation Q8H     sodium chloride (PF)  3 mL Intracatheter Q8H                  Physical Exam:     Patient Vitals for the past 24 hrs:   BP Temp Temp src Pulse Resp SpO2   09/24/21 0854 (!) 155/92 97.1  F (36.2  C) Oral 64 14 96 %   09/24/21 0400 (!) 149/82  97.9  F (36.6  C) Oral 67 14 95 %   09/23/21 1900 138/78 -- Oral 74 17 --   09/23/21 1500 (!) 143/76 97.8  F (36.6  C) Oral 78 17 96 %   09/23/21 1159 (!) 145/81 98.1  F (36.7  C) Oral 67 16 96 %     Wt Readings from Last 4 Encounters:   09/22/21 91.1 kg (200 lb 12.8 oz)   09/20/21 89.4 kg (197 lb)       Intake/Output Summary (Last 24 hours) at 9/23/2021 0957  Last data filed at 9/23/2021 0900  Gross per 24 hour   Intake 1704 ml   Output 1745 ml   Net -41 ml     Constitutional: normal  Eyes: normal  ENT: normal  Neck: Supple, symmetrical, trachea midline, no adenopathy, thyroid symmetric, not enlarged and no tenderness, skin normal.  Hematologic / Lymphatic: normal  Back: normal  Lungs: No increased work of breathing, good air exchange, clear to auscultation bilaterally, no crackles or wheezing.  Cardiovascular: Regular rate and rhythm, normal S1 and S2, no S3 or S4, and no murmur noted.  Chest / Breast: normal  Abdomen: No scars, normal bowel sounds, soft, non-distended, non-tender, no masses palpated, no hepatosplenomegaly, drain in place  Genitourinary: normal  Musculoskeletal: normal  Neurologic: normal  Neuropsychiatric: normal  Skin: normal           Data:     Results for orders placed or performed during the hospital encounter of 09/21/21 (from the past 24 hour(s))   Partial thromboplastin time   Result Value Ref Range    aPTT 88 (H) 22 - 38 Seconds   Partial thromboplastin time   Result Value Ref Range    aPTT 75 (H) 22 - 38 Seconds   CBC with platelets differential    Narrative    The following orders were created for panel order CBC with platelets differential.  Procedure                               Abnormality         Status                     ---------                               -----------         ------                     CBC with platelets and d...[[id number]]  Abnormal            Final result                 Please view results for these tests on the individual orders.   Basic metabolic panel    Result Value Ref Range    Sodium 137 133 - 144 mmol/L    Potassium 3.7 3.4 - 5.3 mmol/L    Chloride 105 94 - 109 mmol/L    Carbon Dioxide (CO2) 23 20 - 32 mmol/L    Anion Gap 9 3 - 14 mmol/L    Urea Nitrogen 12 7 - 30 mg/dL    Creatinine 1.16 0.66 - 1.25 mg/dL    Calcium 8.6 8.5 - 10.1 mg/dL    Glucose 90 70 - 99 mg/dL    GFR Estimate 68 >60 mL/min/1.73m2   Partial thromboplastin time   Result Value Ref Range    aPTT 70 (H) 22 - 38 Seconds   CBC with platelets and differential   Result Value Ref Range    WBC Count 9.2 4.0 - 11.0 10e3/uL    RBC Count 4.58 4.40 - 5.90 10e6/uL    Hemoglobin 12.7 (L) 13.3 - 17.7 g/dL    Hematocrit 37.9 (L) 40.0 - 53.0 %    MCV 83 78 - 100 fL    MCH 27.7 26.5 - 33.0 pg    MCHC 33.5 31.5 - 36.5 g/dL    RDW 13.2 10.0 - 15.0 %    Platelet Count 263 150 - 450 10e3/uL    % Neutrophils 76 %    % Lymphocytes 13 %    % Monocytes 9 %    % Eosinophils 1 %    % Basophils 0 %    % Immature Granulocytes 1 %    NRBCs per 100 WBC 0 <1 /100    Absolute Neutrophils 7.0 1.6 - 8.3 10e3/uL    Absolute Lymphocytes 1.2 0.8 - 5.3 10e3/uL    Absolute Monocytes 0.8 0.0 - 1.3 10e3/uL    Absolute Eosinophils 0.1 0.0 - 0.7 10e3/uL    Absolute Basophils 0.0 0.0 - 0.2 10e3/uL    Absolute Immature Granulocytes 0.1 (H) <=0.0 10e3/uL    Absolute NRBCs 0.0 10e3/uL

## 2021-09-24 NOTE — PLAN OF CARE
Care Plan Nursing Note    Patient Information  Name: Boom Daniel  Age: 61 year old  Reason for admission: Fever, S/P abdominal abscess drain placement     Patient Assessment   DATE & TIME: 09/23/2021,    Cognitive Concerns/ Orientation : A & O times four  BEHAVIOR & AGGRESSION TOOL COLOR: calm  ABNL VS/O2: VSS, room air  MOBILITY: Independent  PAIN MANAGMENT: mild drain site discomfort  DIET: Full liquid  BOWEL/BLADDER: continent  ABNL LAB/BG: on heparin gtt. Monitor PTT  DRAIN/DEVICES: PIV, drain  SKIN: WDL  TESTS/PROCEDURES: Lab  D/C DATE: Pending  OTHER IMPORTANT INFO:  on heparin gtt, Patient is in the GOAL rate at this time, please follow up with next PTT. Continue to monitor.

## 2021-09-24 NOTE — PROGRESS NOTES
[facility]ON [facility]  PROGRESS NOTE    September 24, 2021    SUBJECTIVE:  Minimal abdominal pain. No further fevers, vitals stable. Tolerating full liquid diet, no n/v. + flatus + BM. Ambulating. HAYDEE drain with 38cc out. WBC 9.2 from 11.2. Hgb 12.7, stable. Cr continues to improve. No growth on blood cultures.     OBJECTIVE:  Temp:  [97.8  F (36.6  C)-98.9  F (37.2  C)] 97.9  F (36.6  C)  Pulse:  [67-78] 67  Resp:  [14-18] 14  BP: (138-149)/(76-82) 149/82  SpO2:  [95 %-96 %] 95 %    Intake/Output Summary (Last 24 hours) at 9/24/2021 0806  Last data filed at 9/24/2021 0636  Gross per 24 hour   Intake --   Output 1778 ml   Net -1778 ml       GENERAL:  Awake, alert, no acute distress  HEAD: Normocephalic atraumatic  SCLERA: Anicteric  EXTREMITIES: Warm and well perfused  ABDOMEN:  Soft, non-tender, non-distended. No guarding, rigidity, or peritoneal signs. HAYDEE with bloody drainage.    LABS:  Lab Results   Component Value Date    WBC 9.2 09/24/2021     Lab Results   Component Value Date    HGB 12.7 09/24/2021     Lab Results   Component Value Date    HCT 37.9 09/24/2021     Lab Results   Component Value Date     09/24/2021     Last Basic Metabolic Panel:  Lab Results   Component Value Date     09/24/2021      Lab Results   Component Value Date    POTASSIUM 3.7 09/24/2021     Lab Results   Component Value Date    CHLORIDE 105 09/24/2021     Lab Results   Component Value Date    BEREKET 8.6 09/24/2021     Lab Results   Component Value Date    CO2 23 09/24/2021     Lab Results   Component Value Date    BUN 12 09/24/2021     Lab Results   Component Value Date    CR 1.16 09/24/2021     Lab Results   Component Value Date    GLC 90 09/24/2021       ASSESSMENT/PLAN: 61-year-old male admitted with likely complicated sigmoid diverticulitis with abscess and possible developing hepatic abscess. Percutaneous drain placed 9/22, RRT for rigors afterward, stable and feeling better today. WBC improved. Having bowel function and  tolerating full liquids, ok for low fiber. No surgical intervention indicated at this time, recommend continuing conservative management. ID and vascular following. Discharging today. Needs a repeat CT abd/pelvis and sinogram in one week to reassess liver lesion and abscess. Will arrange clinic follow up with Dr. Catherine in 2 weeks and colonoscopy in 4-6 weeks.     1. Low fiber diet  2. PRN pain meds  3. Continue IVF  4. Abx per ID  5. Vascular following, on heparin, recommending starting Eliquis at discharge  6. OOB, ambulate  7. Ok to discharge from CRS perspective, will continue to follow while inpatient  8. Repeat CT abd/pelvis and CT sinogram ordered for 9/30. Will arrange outpatient CRS follow up and colonoscopy.     Discussed with Dr. Catherine.    For questions/paging, please contact the CRS office at [phone].    Dean Alvarado PA-C  Colorectal Physician Assistant    [facility]  [address] [address]  T: [phone]  F: [phone]

## 2021-09-25 VITALS
TEMPERATURE: 97.5 F | DIASTOLIC BLOOD PRESSURE: 83 MMHG | WEIGHT: 200.8 LBS | OXYGEN SATURATION: 95 % | RESPIRATION RATE: 19 BRPM | SYSTOLIC BLOOD PRESSURE: 142 MMHG | HEIGHT: 71 IN | BODY MASS INDEX: 28.11 KG/M2 | HEART RATE: 64 BPM

## 2021-09-25 LAB
ACINETOBACTER SPECIES: NOT DETECTED
APTT PPP: 48 SECONDS (ref 22–38)
CITROBACTER SPECIES: NOT DETECTED
CTX-M: NORMAL
ENTEROBACTER SPECIES: NOT DETECTED
ESCHERICHIA COLI: NOT DETECTED
IMP: NORMAL
KLEBSIELLA OXYTOCA: NOT DETECTED
KLEBSIELLA PNEUMONIAE: NOT DETECTED
KPC: NORMAL
NDM: NORMAL
OXA (DETECTED/NOT DETECTED): NORMAL
PROTEUS SPECIES: NOT DETECTED
PSEUDOMONAS AERUGINOSA: NOT DETECTED
VIM: NORMAL

## 2021-09-25 PROCEDURE — 36415 COLL VENOUS BLD VENIPUNCTURE: CPT | Performed by: INTERNAL MEDICINE

## 2021-09-25 PROCEDURE — 85730 THROMBOPLASTIN TIME PARTIAL: CPT | Performed by: INTERNAL MEDICINE

## 2021-09-25 PROCEDURE — 250N000013 HC RX MED GY IP 250 OP 250 PS 637: Performed by: INTERNAL MEDICINE

## 2021-09-25 PROCEDURE — 250N000011 HC RX IP 250 OP 636: Performed by: SPECIALIST

## 2021-09-25 PROCEDURE — 250N000013 HC RX MED GY IP 250 OP 250 PS 637: Performed by: SPECIALIST

## 2021-09-25 PROCEDURE — 99239 HOSP IP/OBS DSCHRG MGMT >30: CPT | Performed by: INTERNAL MEDICINE

## 2021-09-25 PROCEDURE — 258N000003 HC RX IP 258 OP 636: Performed by: NURSE PRACTITIONER

## 2021-09-25 RX ORDER — SACCHAROMYCES BOULARDII 250 MG
250 CAPSULE ORAL 2 TIMES DAILY
Qty: 56 CAPSULE | Refills: 0 | Status: SHIPPED | OUTPATIENT
Start: 2021-09-25 | End: 2021-10-23

## 2021-09-25 RX ADMIN — SODIUM CHLORIDE, POTASSIUM CHLORIDE, SODIUM LACTATE AND CALCIUM CHLORIDE: 600; 310; 30; 20 INJECTION, SOLUTION INTRAVENOUS at 03:40

## 2021-09-25 RX ADMIN — METRONIDAZOLE 500 MG: 500 TABLET ORAL at 08:05

## 2021-09-25 RX ADMIN — SODIUM CHLORIDE, POTASSIUM CHLORIDE, SODIUM LACTATE AND CALCIUM CHLORIDE: 600; 310; 30; 20 INJECTION, SOLUTION INTRAVENOUS at 11:10

## 2021-09-25 RX ADMIN — CEFTRIAXONE SODIUM 2 G: 2 INJECTION, POWDER, FOR SOLUTION INTRAMUSCULAR; INTRAVENOUS at 08:05

## 2021-09-25 RX ADMIN — APIXABAN 10 MG: 5 TABLET, FILM COATED ORAL at 08:05

## 2021-09-25 RX ADMIN — AMOXICILLIN AND CLAVULANATE POTASSIUM 1 TABLET: 875; 125 TABLET, FILM COATED ORAL at 14:15

## 2021-09-25 ASSESSMENT — ACTIVITIES OF DAILY LIVING (ADL)
ADLS_ACUITY_SCORE: 4
ADLS_ACUITY_SCORE: 4
ADLS_ACUITY_SCORE: 5
ADLS_ACUITY_SCORE: 4

## 2021-09-25 NOTE — PROGRESS NOTES
Bedside RN alerted care coordinator to appointments needed.  Noted there was an order placed,    Prior to discharge, discharging RN to call [phone] to schedule the patient to see Lovely Kennedy PA-C in 10 to 14 days at [facility] ([address] [place] [address], [address]) to address hospital f/u of hepatic vein thrombosis.  [phone]        Unfortunately as with all specialty appointments on the weekends, these phone numbers go to the after-hours emergency on-call physician paging service, not to schedulers.   Patient will need to call to schedule the appointment during normal business hours on Monday morning.      I did add phone numbers to the instructions to follow up with other specialty providers, for pt reference:     Colorectal Surgery will call you to arrange follow up, [facility] [phone]   Follow up with Dr. Barnes (Infectious Disease) in 2 weeks; her clinic should call you on Monday; [facility] [phone]     Bedside RN also says pt needs outpatient CT scan.  There is no outpatient CT scan order.  Added contact information for CT scan scheduling but a provider needs to enter an order for one before pt can be successful in scheduling;     If there is an Outpatient Order already in place for your follow-up CT scan, you can call to schedule it: [phone]  (If there is no order, you will need to see or talk to your doctor for the order first.)      Padmini Cao RN, BSN, PHN  [facility]  Inpatient Care Management - FLOAT  Mobile: [phone] 09/25/21 until 4pm  (after today's date, please call the patient's unit)

## 2021-09-25 NOTE — DISCHARGE SUMMARY
[facility]  Hospitalist Discharge Summary      Date of Admission:  9/21/2021  Date of Discharge:  9/25/2021  Discharging Provider: Alma Delia Toney MD    Discharge Diagnoses   1. Sepsis due to perforated sigmoid diverticulitis with multiple neena-diverticular abscesses and hepatic phlegmon, s/p CT-guided drain placement to intra-abdominal abscess on 9/22/21  2. History of MRSA (nasal swab)  3. 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 CHANA on CKD stage II  5. Hyponatremia  6. Possible COPD  7. RML pulmonary nodule, incidental finding  8. Essential hypertension  9. JARED     Follow-ups Needed After Discharge   Follow-up Appointments     Follow-up and recommended labs and tests       Colorectal Surgery will call you to arrange follow up  Follow up with Dr. Barnes (Infectious Disease) in 2 weeks; her clinic should   call you on Monday           Unresulted Labs Ordered in the Past 30 Days of this Admission     Date and Time Order Name Status Description    9/22/2021  4:16 PM Blood Culture Hand, Right Preliminary     9/22/2021  4:16 PM Blood Culture Arm, Left Preliminary     9/22/2021  1:56 PM Aspirate Aerobic Bacterial Culture Routine Preliminary     9/22/2021  1:56 PM Anaerobic Bacterial Culture Routine Preliminary     9/21/2021  9:56 PM Blood Culture Arm, Left Preliminary     9/21/2021  9:56 PM Blood Culture Peripheral Blood Preliminary       These results will be followed up by me    Discharge Disposition   Discharged to home  Condition at discharge: Stable    Hospital Course   Boom Daniel is a 61 year old male with hx of JARED and former tobacco use who was admitted on 9/21/2021 for sepsis due to perforated diverticulitis with abscesses, and a hepatic phlegmon. He was also found to have a non-occlusive thrombus of the hepatic vein during this hospitalization    Sepsis due to perforated sigmoid diverticulitis with multiple neena-diverticular abscesses and hepatic phlegmon, s/p  CT-guided drain placement to intra-abdominal abscess on 9/22/21  History of MRSA (nasal swab)  Presented with fever, cough, and shortness of breath. Fever to 103.2 and leukocytosis (15.2) present on arrival. CXR on arrival was clear. CT chest/abd/pelvis on arrival showed sigmoid diverticulosis with mild wall thickening, multiple complex fluid collections in the lower pelvis concerning for abscesses, and 4.8 cm lesion in the liver suggestive of evolving abscess. In the ED, he was initiated on IV pip-tazo, which was changed to ceftriaxone and metronidazole per ID upon admission. IR was consulted, and patient underwent CT-guided drain placement on 9/22. Hepatic abscess could not be drained due to its small size. Colorectal Surgery was also consulted, and recommended medical management.   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 On admission, he was initiated on IV heparin. Vascular Medicine was consulted, and recommended apixaban   - He will be discharged with apixaban 10 mg BID x1 week then 5 mg BID  - He will follow up with Lovely Kennedy PA-C at the [facility] 1-2 weeks after discharge with CT venogram in 3 months to determine if anticoagulation can be discontinued    CHANA on CKD stage II, Resolved  Creatinine up to 1.53 from baseline 1.1-1.2. Resolved with IV fluids    Hyponatremia, Resolved  Na 131 on admission. Resolved with IV fluids     Possible COPD  Presented with cough and shortness of breath with no acute findings on CXR or CT. Emphysematous changes were noted, and given smoking history, likely has some  degree of COPD  - Recommend outpatient PFTs    RML pulmonary nodule, incidental finding  Solitary pulmonary nodule 4 mm incidentally noted on admission CT  - Repeat chest CT w/contrast in 12 months or sooner if concerning symptoms arise    Essential hypertension  - Resume PTA losartan at discharge    JARED  Chronic and stable on CPAP    Consultations This Hospital Stay   1. COLORECTAL SURGERY IP CONSULT  2. INFECTIOUS DISEASES IP CONSULT  3. [facility] IP CONSULT    Code Status   Full Code    Time Spent on this Encounter   I, Alma Delia Toney MD, personally saw the patient today and spent 35 minutes discharging this patient.       Alma Delia Toney MD  [facility]  [address] [address]  Phone: [phone]  ______________________________________________________________________    Physical Exam   Vital Signs: Temp: 97.5  F (36.4  C) Temp src: Oral BP: (!) 142/83 Pulse: 64   Resp: 19 SpO2: 95 % O2 Device: None (Room air)    Weight: 200 lbs 12.8 oz    Constitutional: Appears comfortable, NAD  Respiratory: Breathing non-labored. Lungs CTAB - no wheezes/crackles/rhonchi  Cardiovascular: Heart RRR, no m/r/g. No pedal edema.   GI: +BS. Abd soft/NT. HAYDEE drain with scant bloody drainage  Skin: Warm and dry. No rash.  Musculoskeletal: Normal muscle bulk and tone  Neurologic: Alert and appropriate. SANTOS  Psychiatric: Calm and cooperative    Primary Care Physician   [facility]    Discharge Orders      Brief Discharge Instructions    Prior to discharge, discharging RN to call [phone] to schedule the patient to see Lovely Kennedy PA-C in 10 to 14 days at [facility] ([address], [address]) to address hospital f/u of hepatic vein thrombosis.     Reason for your hospital stay    Perforated diverticulitis causing abscesses. She were treated with IV antibiotics     Follow-up and recommended labs and tests      Colorectal Surgery will call you to arrange follow up  Follow up with Dr. Barnes (Infectious Disease) in 2 weeks; her clinic should call you on Monday     Activity    Your activity upon discharge: activity as tolerated     Diet    Follow this diet upon discharge:  Low Fiber Diet       Significant Results and Procedures   Most Recent 3 CBC's:Recent Labs   Lab Test 09/24/21  0538 09/23/21  1012 09/23/21  0622   WBC 9.2 11.2* 11.2*   HGB 12.7* 11.7* 11.3*   MCV 83 85 84    214 211     Most Recent 3 BMP's:Recent Labs   Lab Test 09/24/21  0538 09/23/21  0622 09/22/21  1628    136 140   POTASSIUM 3.7 3.7 3.8   CHLORIDE 105 106 108   CO2 23 22 19*   BUN 12 14 15   CR 1.16 1.20 1.31*   ANIONGAP 9 8 13   BEREKET 8.6 8.3* 8.4*   GLC 90 89 81     Most Recent 2 LFT's:Recent Labs   Lab Test 09/22/21  1628 09/21/21  2143   AST 27 35   ALT 44 53   ALKPHOS 131 148   BILITOTAL 1.0 0.7   ,   Results for orders placed or performed during the hospital encounter of 09/21/21   CT Chest (PE) Abdomen Pelvis w Contrast    Narrative    EXAM: CT CHEST PE ABDOMEN PELVIS W CONTRAST  LOCATION: [facility]  DATE/TIME: 9/22/2021 12:26 AM    INDICATION: Shortness of breath and cough. Fever, diarrhea, abdominal pain.  COMPARISON: None.  TECHNIQUE: CT chest pulmonary angiogram and routine CT abdomen pelvis with IV contrast. Arterial phase through the chest and venous phase through the abdomen and pelvis. Multiplanar reformats and MIP reconstructions were performed. Dose reduction   techniques were used.   CONTRAST: 99 mL [id number]    FINDINGS:  ANGIOGRAM CHEST: Pulmonary arteries are normal caliber and negative for pulmonary emboli. Thoracic aorta is negative for dissection. No CT evidence of right heart strain.     LUNGS AND PLEURA: No acute infiltrates or consolidation. Mild dependent atelectasis in the lower lungs. Mild paraseptal emphysematous changes in the upper lungs. Indeterminate 4 mm nodule right middle lobe  on series 6 image 150. No pleural effusions.    MEDIASTINUM/AXILLAE: No adenopathy. Normal heart size. No pericardial effusion. Normal caliber thoracic aorta. Small hiatal hernia. Axillary regions are unremarkable.    CORONARY ARTERY CALCIFICATION: None.    HEPATOBILIARY: There is an indeterminate heterogeneous low-attenuation lesion with ill-defined borders in the right hepatic lobe posteriorly measuring approximately 4.8 x 4.7 x 3.9 cm. Given the findings described below, this is concerning for potential   developing phlegmonous reaction/abscess within the liver. There is an adjacent small tubular shaped low-density structure seen adjacent to this process which could represent a partially thrombosed hepatic vein. Additional indeterminate 1.3 cm   low-attenuation lesion in the left hepatic lobe superiorly on image 21. No calcified gallstones or biliary dilatation.    PANCREAS: Normal.    SPLEEN: Normal.    ADRENAL GLANDS: Normal.    KIDNEYS/BLADDER: Normal.    BOWEL: Sigmoid diverticulosis. There appears to be some mild low-density thickening in the wall of the sigmoid colon. Immediately anterior to the sigmoid colon in the lower pelvis anteriorly, there is a complex peripherally enhancing fluid collection   with mildly thickened walls measuring up to 5.0 cm in diameter. This is seen situated just superior to the bladder and is most suggestive of an abscess collection. Additional 3.5 cm less well-circumscribed fluid collection adjacent to the sigmoid colon   on image 154 may represent an additional developing abscess. Findings would be most suggestive of diverticulitis with peridiverticular abscesses. No free air and no evidence for bowel obstruction. Appendix is normal. Close imaging follow-up is   recommended to monitor for resolution and exclude any underlying lesions.    LYMPH NODES: Normal.    VASCULATURE: Aortic calcification without aneurysm.    PELVIC ORGANS: Normal.    MUSCULOSKELETAL: No significant bony  findings. Benign-appearing area of sclerosis in the left supra-acetabular region likely represents a benign bone island. Degenerative changes both hips.      Impression    IMPRESSION:  1.  Negative for pulmonary embolism. No acute findings in the chest.    2.  Technically indeterminate 4 mm pulmonary nodule in the right middle lobe. Recommend follow-up CT in one year for reevaluation.    3.  Mild paraseptal emphysematous changes in the upper lungs.    4.  Sigmoid diverticulosis with mild wall thickening. Complex fluid collections are seen in the lower pelvis anteriorly situated between the sigmoid colon and superior aspect of the bladder with the largest collection measuring up to 5.0 cm. These are   concerning for abscesses and constellation of findings is most suggestive of diverticulitis with peridiverticular abscesses. Clinical correlation. Close imaging follow-up is recommended to monitor for resolution of these findings and exclude any   underlying lesions.    5.  Indeterminate heterogeneous low-attenuation lesion with ill-defined margins in the right hepatic lobe measuring up to 4.8 cm in diameter. This lesion is indeterminate but concerning for an area of phlegmonous change and potential developing abscess   within the liver. Additional indeterminate 1.3 cm low-attenuation lesion left hepatic lobe. MRI could be considered in further evaluation. Otherwise close follow-up in the liver is recommended to exclude any underlying lesion or progressive process.    6.  Small tubular shaped low-density structure seen in the right hepatic lobe is suggestive of a partially thrombosed intrahepatic vein.    Findings called to Dr. Astudillo 9/22/2021 12:50 AM.   CT Abdomen Peritonium Abscess Drainage    Narrative    CT ABDOMEN PERITONEUM ABSCESS DRAIN WITH CATHETER PLACE  9/22/2021  2:34 PM     HISTORY: Liver abscess, multiple colon abscesses. Percutaneous  drainage requested by primary service.    COMPARISON: None.      TECHNIQUE: Volumetric helical acquisition of CT images were acquired  without contrast. Radiation dose for this scan was reduced using  automated exposure control, adjustment of the mA and/or kV according  to patient size, or iterative reconstruction technique.    MEDICATIONS:  2mg versed, 100mcg fentanyl given by RORY Manrique;  10ml  lidocaine given by Dr Deleon    FINDINGS: After written and oral informed consent was obtained, and  pause for the cause correctly identified the patient and procedure,  the patient was scanned in supine position for procedural planning.  The subcutaneous tissues overlying the fluid collection in the pelvis  was identified, marked, prepped and draped in the usual sterile  fashion, and anesthetized with 10 mL of 1% subcutaneous lidocaine. The  fluid collection was accessed with a hollow bore needle, and purulent  material was aspirated. An 0.035 floppy tipped wire was advanced into  the fluid collection and the needle was removed over the wire. The  tract was serially dilated.  A 10 [place] locking pigtail catheter was  advanced into the abscess and its position was verified with  postprocedural imaging. The catheter was fixed to the overlying skin  using an adhesive bandage. There were no immediate complications and  patient left the CT suite in satisfactory condition.     CONSCIOUS SEDATION NOTE: After obtaining consent for sedation,  conscious sedation was induced using IV Versed and IV fentanyl.  Patient was monitored by nurse under my direct supervision throughout  the exam. There were no complications of the sedation. 15 minutes  face-to-face time.      Impression    IMPRESSION:   1. Technically successful CT guided abscess drain placement.   2. Conscious sedation.    NICCI DELEON MD         SYSTEM ID:  [id number]   US Liver    Narrative    US LIVER  9/24/2021 4:10 PM     HISTORY: Indeterminant liver lesions on CT.    COMPARISON: CT from 9/21/2021.    FINDINGS: Two small  hypoechoic areas in the posterior dome of the  liver with the larger of the two measuring 2.9 x 2.5 x 2.8 cm noted,  conceivably, these may be tiny abscesses. Solid lesions not excluded.  Well-circumscribed 1.4 cm probable hemangioma noted in the liver.      Impression    IMPRESSION: Two small hypoechoic lesions in the upper posterior right  lobe of the liver, the larger of the two measuring 2.9 x 2.5 x 2.8 cm.  Small abscesses or solid lesions are considerations in the  differential.     NICCI GERMAN MD         SYSTEM ID:  [id number]       Discharge Medications   Current Discharge Medication List      START taking these medications    Details   amoxicillin-clavulanate (AUGMENTIN) 875-125 MG tablet Take 1 tablet by mouth 2 times daily for 28 days  Qty: 56 tablet, Refills: 0    Associated Diagnoses: Diverticulitis of large intestine with abscess, unspecified bleeding status; Sepsis, due to unspecified organism, unspecified whether acute organ dysfunction present (H)      !! apixaban ANTICOAGULANT (ELIQUIS) 5 MG tablet Take 2 tablets (10 mg) by mouth 2 times daily for 14 doses  Qty: 28 tablet, Refills: 0    Associated Diagnoses: Hepatic vein thrombosis (H)      !! apixaban ANTICOAGULANT (ELIQUIS) 5 MG tablet Take 1 tablet (5 mg) by mouth 2 times daily  Qty: 180 tablet, Refills: 1    Associated Diagnoses: Hepatic vein thrombosis (H)       !! - Potential duplicate medications found. Please discuss with provider.      CONTINUE these medications which have NOT CHANGED    Details   losartan (COZAAR) 50 MG tablet Take 50 mg by mouth daily         STOP taking these medications       doxycycline hyclate (VIBRAMYCIN) 100 MG capsule Comments:   Reason for Stopping:             Allergies   No Known Allergies

## 2021-09-25 NOTE — PLAN OF CARE
A&Ox4. Independent in room. Contact precautions. HAYDEE drain flushed. Output-5 ml. Low fiber diet. VSS on RA. Mildly hypertensive. PIV infusing LR. Denies pain.

## 2021-09-25 NOTE — PROGRESS NOTES
COLON & RECTAL SURGERY  PROGRESS NOTE    September 25, 2021    SUBJECTIVE:  No abdominal pain. No further fevers, vitals stable. Tolerating LFD, no N/V. + flatus + BM. Ambulating. HAYDEE drain with 15cc out. Liver US yesterday with possible other developing abscess.    OBJECTIVE:  Temp:  [97.4  F (36.3  C)-97.8  F (36.6  C)] 97.4  F (36.3  C)  Pulse:  [63-72] 63  Resp:  [12-14] 12  BP: (147-152)/(76-82) 151/81  SpO2:  [95 %-99 %] 95 %      Intake/Output Summary (Last 24 hours) at 9/25/2021 1130  Last data filed at 9/25/2021 0800  Gross per 24 hour   Intake 7016 ml   Output 17 ml   Net 6999 ml         GENERAL:  Awake, alert, no acute distress  HEAD: Normocephalic atraumatic  SCLERA: Anicteric  EXTREMITIES: Warm and well perfused  ABDOMEN:  Soft, non-tender, non-distended. No guarding, rigidity, or peritoneal signs. HAYDEE with minimal serosanguinous drainage.    LABS:  WBC/Hgb/Hct/Plts:  9.2/12.7/37.9/-- (09/24 0538)  BUN/Cr/glu/ALT/AST/amyl/lip:  --/1.16/--/--/--/--/-- (09/24 0538)    ASSESSMENT/PLAN: 61-year-old male admitted with likely complicated sigmoid diverticulitis with abscess and possible developing hepatic abscess. Percutaneous drain placed 9/22, RRT for rigors afterward, stable and feeling better today. WBC improved. Having bowel function and tolerating LFD. No surgical intervention indicated at this time, recommend continuing conservative management. ID and vascular following. Needs a repeat CT abd/pelvis and sinogram in one week to reassess liver lesion and abscess. Will arrange clinic follow up with Dr. Catherine in 2 weeks and colonoscopy in 4-6 weeks.     1. Low fiber diet  2. PRN pain meds  3. Continue IVF  4. Abx per ID  5. Vascular following, on heparin, recommending starting Eliquis at discharge  6. OOB, ambulate  7. Ok to discharge from CRS perspective, will continue to follow while inpatient  8. Repeat CT abd/pelvis and CT sinogram ordered for 9/30. Will arrange outpatient CRS follow up and  colonoscopy.    Edwige Montoya MD  Colon & Rectal Surgery Fellow  [facility]  [phone]

## 2021-09-25 NOTE — PLAN OF CARE
A&OX4, VSS on RA, afebrile this shift. All discharge instructions and meds reviewed with pt and spouse, both verbalized understanding. Pt stable at discharge. All belongings including discharge meds and drain care supplies given to pt.

## 2021-09-25 NOTE — PROGRESS NOTES
Diverticulitis w abscess. Hx MRSA, precaution maintained. Alert Ox4. Independent. HAYDEE drain minimal output. Low fiber diet. VSS, elevated bp asymptomatic. LR running. Continue to monitor.

## 2021-09-25 NOTE — PLAN OF CARE
A&OX4, VSS on RA, afebrile this shift. Denies pain. HAYDEE drain with minimal serosanguinous output. Education given on drain care. Abd drain cx growing gram negative bacilli. On flagyl and IV ceftriaxone. US of the liver completed, please see results. Weaned off of hep gtt, on Eliquis now. Tolerating low fiber diet. Up ind. , IR, ID & Vascular following. Continue to monitor.

## 2021-09-25 NOTE — DISCHARGE INSTRUCTIONS
Pelvic drain home care instructions     1) Change the gauze and tape dressing every 2-3 days. If stay fix dressing used it can stay on for a week as long as it doesn't become wet.   -Wash the skin with soap and water and allow to air dry before re dressing  -Please cover with plastic (saran wrap) prior to showering and change it the dressing gets wet.     2) Flush the drain with 5 mL Normal Saline (NS) twice daily, morning and evening. (See instructions below)   -Empty, measure and record the outputs daily. Subtract the NS flush amount from the total    Flushing your tube with Saline  1) Clean your work area and wash your hands with soap and water or foam them with antibacterial .   Gather a NS syringe and alcohol pad    2) Take the clear package off the syringe. Remove the cap from the syringe and hold the syringe so it points upwards. Gently push the plunger until the air comes out and you have the right amount of saline.   Replace the syringe cap.     3) Flush the drain  A. Make sure the off arm of the stopcock if pointing to the flush port as shown below.   B. Clean the flush port with an alcohol wipe  C. Screw the tip of the syringe into the flush port        D. Turn the off arm of the stopcock toward the drainage bag (see below)   E. Slowly push in the plunger to inject the saline. If you feel pain or resistance (if it is hard to inject the saline) stop.     F. Turn the off arm of the stopcock back toward the flush port (see below)   G. Unscrew and remove the syringe. If you have a cap put it back on the flush port.       Please call Diane STRONG RN clinician at  or Janet STRONG NP at  for questions or concerns about the tube. You can leave a voicemail. We work Monday through Friday [phone].     An alternative number to call for questions is Interventional Radiology at           If there is an Outpatient Order already in place for your follow-up CT scan, you can call to  schedule it: [phone]  (If there is no order, you will need to see or talk to your doctor for the order first.)

## 2021-09-26 DIAGNOSIS — Z71.89 OTHER SPECIFIED COUNSELING: ICD-10-CM

## 2021-09-27 ENCOUNTER — PATIENT OUTREACH (OUTPATIENT)
Dept: CARE COORDINATION | Facility: CLINIC | Age: 61
End: 2021-09-27

## 2021-09-27 LAB
BACTERIA BLD CULT: NO GROWTH

## 2021-09-27 NOTE — PROGRESS NOTES
Clinic Care Coordination Contact  [facility]: Post-Discharge Note  SITUATION                                                      Admission:    Admission Date: 09/21/21   Reason for Admission: Sepsis due to perforated sigmoid diverticulitis with multiple neena-diverticular abscesses and hepatic phlegmon, s/p CT-guided drain placement to intra-abdominal abscess on 9/22/21  Discharge:   Discharge Date: 09/25/21  Discharge Diagnosis: Sepsis due to perforated sigmoid diverticulitis with multiple neena-diverticular abscesses and hepatic phlegmon, s/p CT-guided drain placement to intra-abdominal abscess on 9/22/21    BACKGROUND                                                      61 year old male with hx of JARED and former tobacco use who was admitted on 9/21/2021 for sepsis due to perforated diverticulitis with abscesses, and a hepatic phlegmon. He was also found to have a non-occlusive thrombus of the hepatic vein during this hospitalization     Sepsis due to perforated sigmoid diverticulitis with multiple neena-diverticular abscesses and hepatic phlegmon, s/p CT-guided drain placement to intra-abdominal abscess on 9/22/21  History of MRSA (nasal swab)  Presented with fever, cough, and shortness of breath. Fever to 103.2 and leukocytosis (15.2) present on arrival. CXR on arrival was clear. CT chest/abd/pelvis on arrival showed sigmoid diverticulosis with mild wall thickening, multiple complex fluid collections in the lower pelvis concerning for abscesses, and 4.8 cm lesion in the liver suggestive of evolving abscess. In the ED, he was initiated on IV pip-tazo, which was changed to ceftriaxone and metronidazole per ID upon admission. IR was consulted, and patient underwent CT-guided drain placement on 9/22. Hepatic abscess could not be drained due to its small size. Colorectal Surgery was also consulted, and recommended medical management.   - HAYDEE drain in place; care instructions included with AVS  - Liver US prior to  discharge showed 2 tiny abscesses  - He will be discharged with Augmentin 875/125 mg BID x4 weeks as per ID  - He has a repeat CT abd/pelvis and CT sinogram on 9/30, and Colorectal Surgery will arrange follow-up  - He will follow up with ID in 2 weeks      Non-occlusive thrombus of the right hepatic vein   Noted on admission CT, likely related to hepatic abscess. On admission, he was initiated on IV heparin. Vascular Medicine was consulted, and recommended apixaban   - He will be discharged with apixaban 10 mg BID x1 week then 5 mg BID  - He will follow up with Lovely Kennedy PA-C at the [facility] 1-2 weeks after discharge with CT venogram in 3 months to determine if anticoagulation can be discontinued            ASSESSMENT           Discharge Assessment  How are you doing now that you are home?: Good  How are your symptoms? (Red Flag symptoms escalate to triage hotline per guidelines): Improved  Do you feel your condition is stable enough to be safe at home until your provider visit?: Yes  Does the patient have their discharge instructions? : Yes  Were you started on any new medications or were there changes to any of your previous medications? : Yes (no questions)  Does the patient have all of their medications?: Yes  Do you have questions regarding any of your medications? : No  Discharge follow-up appointment scheduled within 14 calendar days? : No (Still waiting for clinics to call., will route a reminder to scheduling.)  Is patient agreeable to assistance with scheduling? : No    Post-op ([facility] Only)  If the patient had a surgery or procedure, do they have any questions for a nurse?: Yes (see comment) (Percutaneous drain)    Post-op (Clinicians Only)  Fever: No  Chills: No  Eating & Drinking: eating and drinking without complaints/concerns  PO Intake: low fiber  Bowel Function: loose stools  Date of last BM: 09/27/21  Urinary Status: voiding without complaint/concerns        PLAN                                                       Outpatient Plan:    Colorectal Surgery will call you to arrange follow up  Follow up with Dr. Barnes (Infectious Disease) in 2 weeks; her clinic should call you on Monday            No future appointments.      For any urgent concerns, please contact our 24 hour nurse triage line: [phone] ([phone])         Ange Lara MA

## 2021-09-28 DIAGNOSIS — Z11.59 ENCOUNTER FOR SCREENING FOR OTHER VIRAL DISEASES: ICD-10-CM

## 2021-09-29 ENCOUNTER — HOSPITAL ENCOUNTER (INPATIENT)
Facility: CLINIC | Age: 61
LOS: 3 days | Discharge: HOME | End: 2021-10-03
Attending: EMERGENCY MEDICINE | Admitting: INTERNAL MEDICINE
Payer: COMMERCIAL

## 2021-09-29 ENCOUNTER — APPOINTMENT (OUTPATIENT)
Dept: CT IMAGING | Facility: CLINIC | Age: 61
End: 2021-09-29
Attending: EMERGENCY MEDICINE
Payer: COMMERCIAL

## 2021-09-29 DIAGNOSIS — K57.20 DIVERTICULITIS OF LARGE INTESTINE WITH ABSCESS, UNSPECIFIED BLEEDING STATUS: ICD-10-CM

## 2021-09-29 DIAGNOSIS — K75.0 LIVER ABSCESS: ICD-10-CM

## 2021-09-29 DIAGNOSIS — A41.9 SEPSIS, DUE TO UNSPECIFIED ORGANISM, UNSPECIFIED WHETHER ACUTE ORGAN DYSFUNCTION PRESENT (H): ICD-10-CM

## 2021-09-29 DIAGNOSIS — A41.9 SEPSIS WITHOUT ACUTE ORGAN DYSFUNCTION, DUE TO UNSPECIFIED ORGANISM (H): ICD-10-CM

## 2021-09-29 LAB
ALBUMIN SERPL-MCNC: 2.9 G/DL (ref 3.4–5)
ALP SERPL-CCNC: 138 U/L (ref 40–150)
ALT SERPL W P-5'-P-CCNC: 45 U/L (ref 0–70)
ANION GAP SERPL CALCULATED.3IONS-SCNC: 8 MMOL/L (ref 3–14)
AST SERPL W P-5'-P-CCNC: 20 U/L (ref 0–45)
BACTERIA ASPIRATE CULT: ABNORMAL
BACTERIA ASPIRATE CULT: NO GROWTH
BACTERIA BLD CULT: ABNORMAL
BACTERIA BLD CULT: ABNORMAL
BASOPHILS # BLD AUTO: 0 10E3/UL (ref 0–0.2)
BASOPHILS NFR BLD AUTO: 0 %
BILIRUB SERPL-MCNC: 0.6 MG/DL (ref 0.2–1.3)
BUN SERPL-MCNC: 14 MG/DL (ref 7–30)
CALCIUM SERPL-MCNC: 8.6 MG/DL (ref 8.5–10.1)
CHLORIDE BLD-SCNC: 100 MMOL/L (ref 94–109)
CO2 SERPL-SCNC: 25 MMOL/L (ref 20–32)
CREAT SERPL-MCNC: 1.35 MG/DL (ref 0.66–1.25)
EOSINOPHIL # BLD AUTO: 0.1 10E3/UL (ref 0–0.7)
EOSINOPHIL NFR BLD AUTO: 1 %
ERYTHROCYTE [DISTWIDTH] IN BLOOD BY AUTOMATED COUNT: 13.1 % (ref 10–15)
GFR SERPL CREATININE-BSD FRML MDRD: 56 ML/MIN/1.73M2
GLUCOSE BLD-MCNC: 98 MG/DL (ref 70–99)
HCT VFR BLD AUTO: 40.6 % (ref 40–53)
HGB BLD-MCNC: 13.1 G/DL (ref 13.3–17.7)
IMM GRANULOCYTES # BLD: 0.1 10E3/UL
IMM GRANULOCYTES NFR BLD: 1 %
LACTATE SERPL-SCNC: 2 MMOL/L (ref 0.7–2)
LYMPHOCYTES # BLD AUTO: 0.6 10E3/UL (ref 0.8–5.3)
LYMPHOCYTES NFR BLD AUTO: 5 %
MCH RBC QN AUTO: 27.8 PG (ref 26.5–33)
MCHC RBC AUTO-ENTMCNC: 32.3 G/DL (ref 31.5–36.5)
MCV RBC AUTO: 86 FL (ref 78–100)
MONOCYTES # BLD AUTO: 0.1 10E3/UL (ref 0–1.3)
MONOCYTES NFR BLD AUTO: 1 %
NEUTROPHILS # BLD AUTO: 11 10E3/UL (ref 1.6–8.3)
NEUTROPHILS NFR BLD AUTO: 92 %
NRBC # BLD AUTO: 0 10E3/UL
NRBC BLD AUTO-RTO: 0 /100
PLATELET # BLD AUTO: 412 10E3/UL (ref 150–450)
POTASSIUM BLD-SCNC: 3.7 MMOL/L (ref 3.4–5.3)
PROT SERPL-MCNC: 7.9 G/DL (ref 6.8–8.8)
RBC # BLD AUTO: 4.71 10E6/UL (ref 4.4–5.9)
SODIUM SERPL-SCNC: 133 MMOL/L (ref 133–144)
WBC # BLD AUTO: 11.9 10E3/UL (ref 4–11)

## 2021-09-29 PROCEDURE — 36415 COLL VENOUS BLD VENIPUNCTURE: CPT | Performed by: EMERGENCY MEDICINE

## 2021-09-29 PROCEDURE — 85025 COMPLETE CBC W/AUTO DIFF WBC: CPT | Performed by: EMERGENCY MEDICINE

## 2021-09-29 PROCEDURE — 83605 ASSAY OF LACTIC ACID: CPT | Performed by: EMERGENCY MEDICINE

## 2021-09-29 PROCEDURE — C9803 HOPD COVID-19 SPEC COLLECT: HCPCS

## 2021-09-29 PROCEDURE — 83735 ASSAY OF MAGNESIUM: CPT | Performed by: INTERNAL MEDICINE

## 2021-09-29 PROCEDURE — 80053 COMPREHEN METABOLIC PANEL: CPT | Performed by: EMERGENCY MEDICINE

## 2021-09-29 PROCEDURE — 258N000003 HC RX IP 258 OP 636: Performed by: EMERGENCY MEDICINE

## 2021-09-29 PROCEDURE — 96361 HYDRATE IV INFUSION ADD-ON: CPT

## 2021-09-29 PROCEDURE — 99285 EMERGENCY DEPT VISIT HI MDM: CPT | Mod: 25

## 2021-09-29 PROCEDURE — 250N000011 HC RX IP 250 OP 636: Performed by: EMERGENCY MEDICINE

## 2021-09-29 PROCEDURE — 250N000013 HC RX MED GY IP 250 OP 250 PS 637

## 2021-09-29 PROCEDURE — 74177 CT ABD & PELVIS W/CONTRAST: CPT

## 2021-09-29 RX ORDER — ACETAMINOPHEN 500 MG
1000 TABLET ORAL ONCE
Status: COMPLETED | OUTPATIENT
Start: 2021-09-29 | End: 2021-09-29

## 2021-09-29 RX ORDER — ACETAMINOPHEN 325 MG/1
TABLET ORAL
Status: COMPLETED
Start: 2021-09-29 | End: 2021-09-29

## 2021-09-29 RX ORDER — IOPAMIDOL 755 MG/ML
99 INJECTION, SOLUTION INTRAVASCULAR ONCE
Status: COMPLETED | OUTPATIENT
Start: 2021-09-29 | End: 2021-09-29

## 2021-09-29 RX ADMIN — Medication 975 MG: at 22:33

## 2021-09-29 RX ADMIN — SODIUM CHLORIDE 1000 ML: 9 INJECTION, SOLUTION INTRAVENOUS at 22:59

## 2021-09-29 RX ADMIN — IOPAMIDOL 99 ML: 755 INJECTION, SOLUTION INTRAVENOUS at 23:58

## 2021-09-29 RX ADMIN — ACETAMINOPHEN 975 MG: 325 TABLET, FILM COATED ORAL at 22:33

## 2021-09-29 ASSESSMENT — ENCOUNTER SYMPTOMS
BLOOD IN STOOL: 0
VOMITING: 0
COUGH: 1
ABDOMINAL DISTENTION: 1
SHORTNESS OF BREATH: 1
FATIGUE: 1
FEVER: 1
CHILLS: 1

## 2021-09-29 ASSESSMENT — MIFFLIN-ST. JEOR: SCORE: 1720.72

## 2021-09-30 ENCOUNTER — TELEPHONE (OUTPATIENT)
Dept: MEDSURG UNIT | Facility: CLINIC | Age: 61
End: 2021-09-30

## 2021-09-30 PROBLEM — K75.0 LIVER ABSCESS: Status: ACTIVE | Noted: 2021-09-30

## 2021-09-30 LAB
HOLD SPECIMEN: NORMAL
MAGNESIUM SERPL-MCNC: 2 MG/DL (ref 1.6–2.3)
SARS-COV-2 RNA RESP QL NAA+PROBE: NEGATIVE

## 2021-09-30 PROCEDURE — 96367 TX/PROPH/DG ADDL SEQ IV INF: CPT

## 2021-09-30 PROCEDURE — 99223 1ST HOSP IP/OBS HIGH 75: CPT | Mod: AI | Performed by: INTERNAL MEDICINE

## 2021-09-30 PROCEDURE — 250N000011 HC RX IP 250 OP 636: Performed by: EMERGENCY MEDICINE

## 2021-09-30 PROCEDURE — 250N000013 HC RX MED GY IP 250 OP 250 PS 637: Performed by: SPECIALIST

## 2021-09-30 PROCEDURE — 96365 THER/PROPH/DIAG IV INF INIT: CPT

## 2021-09-30 PROCEDURE — 99207 PR NO CHARGE LOS: CPT | Performed by: INTERNAL MEDICINE

## 2021-09-30 PROCEDURE — 250N000011 HC RX IP 250 OP 636: Performed by: INTERNAL MEDICINE

## 2021-09-30 PROCEDURE — 87040 BLOOD CULTURE FOR BACTERIA: CPT | Performed by: EMERGENCY MEDICINE

## 2021-09-30 PROCEDURE — 258N000003 HC RX IP 258 OP 636: Performed by: INTERNAL MEDICINE

## 2021-09-30 PROCEDURE — C9113 INJ PANTOPRAZOLE SODIUM, VIA: HCPCS | Performed by: INTERNAL MEDICINE

## 2021-09-30 PROCEDURE — 36415 COLL VENOUS BLD VENIPUNCTURE: CPT | Performed by: EMERGENCY MEDICINE

## 2021-09-30 PROCEDURE — 250N000009 HC RX 250: Performed by: EMERGENCY MEDICINE

## 2021-09-30 PROCEDURE — 120N000001 HC R&B MED SURG/OB

## 2021-09-30 PROCEDURE — 250N000013 HC RX MED GY IP 250 OP 250 PS 637: Performed by: INTERNAL MEDICINE

## 2021-09-30 PROCEDURE — 87635 SARS-COV-2 COVID-19 AMP PRB: CPT | Performed by: EMERGENCY MEDICINE

## 2021-09-30 RX ORDER — LIDOCAINE 40 MG/G
CREAM TOPICAL
Status: DISCONTINUED | OUTPATIENT
Start: 2021-09-30 | End: 2021-10-03 | Stop reason: HOSPADM

## 2021-09-30 RX ORDER — NALOXONE HYDROCHLORIDE 0.4 MG/ML
0.2 INJECTION, SOLUTION INTRAMUSCULAR; INTRAVENOUS; SUBCUTANEOUS
Status: DISCONTINUED | OUTPATIENT
Start: 2021-09-30 | End: 2021-10-03 | Stop reason: HOSPADM

## 2021-09-30 RX ORDER — LANOLIN ALCOHOL/MO/W.PET/CERES
3 CREAM (GRAM) TOPICAL
Status: DISCONTINUED | OUTPATIENT
Start: 2021-09-30 | End: 2021-10-03 | Stop reason: HOSPADM

## 2021-09-30 RX ORDER — ONDANSETRON 2 MG/ML
4 INJECTION INTRAMUSCULAR; INTRAVENOUS EVERY 6 HOURS PRN
Status: DISCONTINUED | OUTPATIENT
Start: 2021-09-30 | End: 2021-10-03 | Stop reason: HOSPADM

## 2021-09-30 RX ORDER — SACCHAROMYCES BOULARDII 250 MG
250 CAPSULE ORAL 2 TIMES DAILY
Status: DISCONTINUED | OUTPATIENT
Start: 2021-09-30 | End: 2021-10-03 | Stop reason: HOSPADM

## 2021-09-30 RX ORDER — CEFTRIAXONE 2 G/1
2 INJECTION, POWDER, FOR SOLUTION INTRAMUSCULAR; INTRAVENOUS ONCE
Status: COMPLETED | OUTPATIENT
Start: 2021-09-30 | End: 2021-09-30

## 2021-09-30 RX ORDER — HYDROMORPHONE HCL IN WATER/PF 6 MG/30 ML
0.2 PATIENT CONTROLLED ANALGESIA SYRINGE INTRAVENOUS
Status: DISCONTINUED | OUTPATIENT
Start: 2021-09-30 | End: 2021-10-03 | Stop reason: HOSPADM

## 2021-09-30 RX ORDER — AMPICILLIN AND SULBACTAM 2; 1 G/1; G/1
3 INJECTION, POWDER, FOR SOLUTION INTRAMUSCULAR; INTRAVENOUS ONCE
Status: DISCONTINUED | OUTPATIENT
Start: 2021-09-30 | End: 2021-09-30

## 2021-09-30 RX ORDER — OXYCODONE HYDROCHLORIDE 5 MG/1
5 TABLET ORAL EVERY 4 HOURS PRN
Status: DISCONTINUED | OUTPATIENT
Start: 2021-09-30 | End: 2021-10-03 | Stop reason: HOSPADM

## 2021-09-30 RX ORDER — CALCIUM CARBONATE 500 MG/1
1000 TABLET, CHEWABLE ORAL 4 TIMES DAILY PRN
Status: DISCONTINUED | OUTPATIENT
Start: 2021-09-30 | End: 2021-10-03 | Stop reason: HOSPADM

## 2021-09-30 RX ORDER — AMOXICILLIN 250 MG
1 CAPSULE ORAL 2 TIMES DAILY PRN
Status: DISCONTINUED | OUTPATIENT
Start: 2021-09-30 | End: 2021-10-03 | Stop reason: HOSPADM

## 2021-09-30 RX ORDER — PROCHLORPERAZINE MALEATE 10 MG
10 TABLET ORAL EVERY 6 HOURS PRN
Status: DISCONTINUED | OUTPATIENT
Start: 2021-09-30 | End: 2021-10-03 | Stop reason: HOSPADM

## 2021-09-30 RX ORDER — PROCHLORPERAZINE 25 MG
25 SUPPOSITORY, RECTAL RECTAL EVERY 12 HOURS PRN
Status: DISCONTINUED | OUTPATIENT
Start: 2021-09-30 | End: 2021-10-03 | Stop reason: HOSPADM

## 2021-09-30 RX ORDER — ACETAMINOPHEN 325 MG/1
650 TABLET ORAL EVERY 6 HOURS PRN
Status: DISCONTINUED | OUTPATIENT
Start: 2021-09-30 | End: 2021-10-03 | Stop reason: HOSPADM

## 2021-09-30 RX ORDER — ONDANSETRON 4 MG/1
4 TABLET, ORALLY DISINTEGRATING ORAL EVERY 6 HOURS PRN
Status: DISCONTINUED | OUTPATIENT
Start: 2021-09-30 | End: 2021-10-03 | Stop reason: HOSPADM

## 2021-09-30 RX ORDER — ACETAMINOPHEN 650 MG/1
650 SUPPOSITORY RECTAL EVERY 6 HOURS PRN
Status: DISCONTINUED | OUTPATIENT
Start: 2021-09-30 | End: 2021-10-03 | Stop reason: HOSPADM

## 2021-09-30 RX ORDER — NALOXONE HYDROCHLORIDE 0.4 MG/ML
0.4 INJECTION, SOLUTION INTRAMUSCULAR; INTRAVENOUS; SUBCUTANEOUS
Status: DISCONTINUED | OUTPATIENT
Start: 2021-09-30 | End: 2021-10-03 | Stop reason: HOSPADM

## 2021-09-30 RX ORDER — CEFTRIAXONE 2 G/1
2 INJECTION, POWDER, FOR SOLUTION INTRAMUSCULAR; INTRAVENOUS EVERY 24 HOURS
Status: DISCONTINUED | OUTPATIENT
Start: 2021-10-01 | End: 2021-10-02

## 2021-09-30 RX ORDER — METRONIDAZOLE 500 MG/1
500 TABLET ORAL 2 TIMES DAILY
Status: DISCONTINUED | OUTPATIENT
Start: 2021-09-30 | End: 2021-10-02

## 2021-09-30 RX ORDER — AMOXICILLIN 250 MG
2 CAPSULE ORAL 2 TIMES DAILY PRN
Status: DISCONTINUED | OUTPATIENT
Start: 2021-09-30 | End: 2021-10-03 | Stop reason: HOSPADM

## 2021-09-30 RX ADMIN — CEFTRIAXONE SODIUM 2 G: 2 INJECTION, POWDER, FOR SOLUTION INTRAMUSCULAR; INTRAVENOUS at 02:05

## 2021-09-30 RX ADMIN — METRONIDAZOLE 500 MG: 500 TABLET ORAL at 20:32

## 2021-09-30 RX ADMIN — PANTOPRAZOLE SODIUM 40 MG: 40 INJECTION, POWDER, FOR SOLUTION INTRAVENOUS at 08:38

## 2021-09-30 RX ADMIN — CALCIUM CARBONATE (ANTACID) CHEW TAB 500 MG 1000 MG: 500 CHEW TAB at 04:22

## 2021-09-30 RX ADMIN — Medication 250 MG: at 08:38

## 2021-09-30 RX ADMIN — Medication 250 MG: at 20:32

## 2021-09-30 RX ADMIN — SODIUM CHLORIDE 68 ML: 900 INJECTION INTRAVENOUS at 00:00

## 2021-09-30 RX ADMIN — DEXTROSE AND SODIUM CHLORIDE: 5; 900 INJECTION, SOLUTION INTRAVENOUS at 05:27

## 2021-09-30 RX ADMIN — DEXTROSE AND SODIUM CHLORIDE: 5; 900 INJECTION, SOLUTION INTRAVENOUS at 15:28

## 2021-09-30 RX ADMIN — METRONIDAZOLE 500 MG: 500 INJECTION, SOLUTION INTRAVENOUS at 02:43

## 2021-09-30 RX ADMIN — SODIUM CHLORIDE 1000 ML: 9 INJECTION, SOLUTION INTRAVENOUS at 04:06

## 2021-09-30 RX ADMIN — METRONIDAZOLE 500 MG: 500 TABLET ORAL at 12:46

## 2021-09-30 ASSESSMENT — ACTIVITIES OF DAILY LIVING (ADL)
ADLS_ACUITY_SCORE: 3
ADLS_ACUITY_SCORE: 3
ADLS_ACUITY_SCORE: 7
ADLS_ACUITY_SCORE: 3
ADLS_ACUITY_SCORE: 3

## 2021-09-30 NOTE — PROGRESS NOTES
Patient seen and examined today, he is resting comfortably, discussed about IR plan to drain the liver abscess possibly 10/1, initiated patient on clear liquid diet, discussed with him that we can advance his diet to regular diet until midnight tonight if he really needs, he mentioned that he does not have a good appetite.  Appreciate ID input, plan to continue ceftriaxone and metronidazole, his prior pelvis fluid culture culture grew Fusobacterium, appreciate colorectal input, conservative management to continue, his drain present.  Denied any pain, currently afebrile.  He had a few episodes of loose stool, discussed with nursing, if it is associated with abdominal cramping or if persist we could test for C. difficile colitis.

## 2021-09-30 NOTE — ED NOTES
[facility]  ED Nurse Handoff Report    ED Chief complaint: Fever      ED Diagnosis:   Final diagnoses:   Liver abscess   Sepsis without acute organ dysfunction, due to unspecified organism (H)       Code Status: to be assessed by admitting provider     Allergies: No Known Allergies    Patient Story: Patient arrives to the ED today due to having a fever of 102 at home. Patient recently was found to have an abscess in his colon and was admitted to the hospital where he had a drain placed. Patient was released from the hospital and states that he had a few good days but then this afternoon patient began to feel like he was spiking a temperature and was found to have a temperature of 102. Patient reports he also began to have chills, shortness of breath and loose stools. CT scan shows an abscess forming on patients liver.   Focused Assessment:    Resp: Patient reports shortness of breath when he started to have a fever   Cardiac: WDL   Neuro: WDL   GI: Known abscess in his colon. Abscess found on the liver   :WDL     Treatments and/or interventions provided: Abx, fluids   Patient's response to treatments and/or interventions: Maintains status     To be done/followed up on inpatient unit:  Continue to monitor     Does this patient have any cognitive concerns?: none     Activity level - Baseline/Home:  Independent  Activity Level - Current:   Independent    Patient's Preferred language: English   Needed?: No    Isolation: None  Infection: Not Applicable  Patient tested for COVID 19 prior to admission: YES  Bariatric?: No    Vital Signs:   Vitals:    09/30/21 0130 09/30/21 0152 09/30/21 0200 09/30/21 0300   BP: 107/65  116/68 110/72   Pulse: 85  85 76   Resp:       Temp:  99.6  F (37.6  C)     TempSrc:  Oral     SpO2: 93%  94% 93%   Weight:       Height:           Cardiac Rhythm:     Was the PSS-3 completed:   Yes  What interventions are required if any?               Family Comments: wife at  bedside   OBS brochure/video discussed/provided to patient/family: N/A              Name of person given brochure if not patient: n.a               Relationship to patient: n.a     For the majority of the shift this patient's behavior was Green.   Behavioral interventions performed were n.a .    ED NURSE PHONE NUMBER: *[phone]

## 2021-09-30 NOTE — PLAN OF CARE
A&Ox4, VSS on RA, afebrile. NPO at breakfast. Independent in room. No c/o pain. Tums given for c/o heartburn. HAYDEE dressing CDI. Blood cultures x2 pending. PIV infusing. Plan pending consults to see.

## 2021-09-30 NOTE — CONSULTS
"CLINICAL NUTRITION SERVICES  -  ASSESSMENT NOTE      Recommendations Ordered by Registered Dietitian (RD):   Advance diet as indicated by colorectal surgery   Malnutrition:  % Weight Loss:  Weight loss does not meet criteria for malnutrition (previous wt loss intentional)  % Intake:  Decreased intake does not meet criteria at this time  Subcutaneous Fat Loss:  None observed  Muscle Loss:  None observed  Fluid Retention:  None noted    Malnutrition Diagnosis: does not meet criteria for malnutrition at this time        REASON FOR ASSESSMENT  Boom Daniel is a 61 year old male seen by Registered Dietitian for Admission Nutrition Risk Screen:  Have you recently lost weight without trying? Yes 24-33#  Have you been eating poorly because of a decreased appetite? no        NUTRITION HISTORY  - visited with pt today  PTA, pt eating well with good appetite  Has been intentionally losing wt slowly over last 6mo+   Has cut out beer \"THAT one was hard!\"  Also eliminated most dairy and breads, replaced these items with fruit  \"I still eat my protein the same, I like to cook chicken\"  States that his wt from 3/22 clinic of 233# sounds \"about right\"  He usually eats 2 meals/day and has not really been snacking since beginning wt loss  Pt says he did not experience any loss of appetite before becoming sick, which was \"1 hour before I got here\"   \"I usually eat less when I'm sick\"    [facility] diverticulitis   Chart review documents pt has had poor appetite over past wks, however denies this to me today    CURRENT NUTRITION ORDERS  Diet Order:     NPO      Current Intake/Tolerance:  At visit today, pt has good appetite, is excited to be able to eat -- \"I'm SO hungry!!\"  Pt has not been able to eat due to NPO, but is confident that he will return to regular eating once diet is advanced    NUTRITION FOCUSED PHYSICAL ASSESSMENT FOR DIAGNOSING MALNUTRITION)  Yes - visual only           Information obtained from chart:  sigmoid colon is " "noninflamed with no evidence of acute diverticulitis per colorectal surgery    ANTHROPOMETRICS  Height: 5' 11\"  Weight: 197 lbs 0 oz / 89.5 kg  Body mass index is 27.48 kg/m .  Weight Status:  Overweight BMI 25-29.9  IBW: 78.2kg  % IBW: 115%  Weight History: Last wt from clinic visit on 3/22 was 105.9 kg, indicating 16% wt loss in last 6 mo. Pt states wt loss has been intentional    Wt Readings from Last 10 Encounters:   09/29/21 89.4 kg (197 lb)   09/22/21 91.1 kg (200 lb 12.8 oz)   09/20/21 89.4 kg (197 lb)       LABS  Labs reviewed    MEDICATIONS  Medications reviewed  dextrose 5% and 0.9% NaCl infusion 1000mL/hr new bag on 10/1 @ 0005    ASSESSED NUTRITION NEEDS PER APPROVED PRACTICE GUIDELINES:    Dosing Weight 89.4kg  Estimated Energy Needs: 2526-5982 kcals (25-30 Kcal/Kg)  Justification: overweight  Estimated Protein Needs:  grams protein (1-1.2 g pro/Kg)  Justification: maintenance  Estimated Fluid Needs: 1mL/kcal or per MD  Justification: maintenance    MALNUTRITION:  % Weight Loss:  Weight loss does not meet criteria for malnutrition (previous wt loss intentional)  % Intake:  Decreased intake does not meet criteria at this time  Subcutaneous Fat Loss:  None observed  Muscle Loss:  None observed  Fluid Retention:  None noted    Malnutrition Diagnosis: does not meet criteria for malnutrition at this time    NUTRITION DIAGNOSIS:  Inadequate oral intake related to restricted diet order for procedure as evidenced by inadequate PO intake for last 2 days.       NUTRITION INTERVENTIONS  Recommendations / Nutrition Prescription  Advance diet as indicated by colorectal surgery  .  Implementation  Nutrition education: Per Provider order if indicated   .    Nutrition Goals  Pt diet to advance beyond NPO or CL in next 24 hours  Pt to consume at least 75% of meals in next 3-5 days      MONITORING AND EVALUATION:  Progress towards goals will be monitored and evaluated per protocol and Practice " Guidelines      Nancy Corley  Dietetic Intern

## 2021-09-30 NOTE — TELEPHONE ENCOUNTER
Pre-Procedure Negative COVID Test Results    Results Reviewed  The patient has a negative COVID test result within the required timeframe for the scheduled procedure.     No COVID pre-call needed.     Natalie Shah RN

## 2021-09-30 NOTE — ED PROVIDER NOTES
History   Chief Complaint:  Fever       The history is provided by the patient.      Boom Daniel is a 61 year old male with history of recent sepsis due to perforated diverticulitis with neena-diverticular abdominal abscesses and hepatic phlegmon s/p HAYDEE drain placement on 9/22, CKD, hyperlipidemia, MRSA, and GERD on apixaban who presents with recurrent fever and chills. The patient has an abscess in his abdomen due to diverticulitis and a drain is in place for this. He was recently released from the hospital on 09/25/2021 with antibiotics and has been feeling fine until today. He reports he has been taking the Augmentin as prescribed. Today he began to feel fatigued and at 2030 he started to spike a fever. He also began to have chills and felt short of breath. Upon arrival he also reports some abdominal distention and states he has had a cough for the past 2 weeks. He denies any vomiting, or blood in the stool. The patient has been having loose stools every day consistently. No significant abdominal pain. He denies chest pain. He denies any other symptoms. He has had minimal output from his HAYDEE drain.    Review of Systems   Constitutional: Positive for chills, fatigue and fever.   Respiratory: Positive for cough and shortness of breath.    Gastrointestinal: Positive for abdominal distention. Negative for blood in stool and vomiting.   All other systems reviewed and are negative.      Allergies:  No Known Allergies    Medications:  amoxicillin-clavulanate   apixaban  losartan   saccharomyces boulardii    Past Medical History:    Diverticulitis of large intestine with abscess  Sepsis    Hyperopia with presbyopia of both eyes  Cataract, nuclear sclerotic, both eyes  Choroidal nevus, left  CKD (chronic kidney disease) stage 2  Acute gout involving toe of left foot  Obstructive sleep apnea on CPAP  Alexander's esophagus  Chronic rhinitis  Hyperlipidemia  Tobacco use disorder  Deviated nasal septum  MRSA (methicillin  "resistant staph aureus) culture positive  GERD (gastroesophageal reflux disease)    Past Surgical History:    Tonsillectomy  Upper gastrointestinal endoscopy     Family History:    heart attack   Gout  High cholesterol   Obesity prostate cancer    Social History:  Presents with wife.   PCP: Medicine, [facility]     Physical Exam     Patient Vitals for the past 24 hrs:   BP Temp Temp src Pulse Resp SpO2 Height Weight   09/30/21 0300 110/72 -- -- 76 -- 93 % -- --   09/30/21 0200 116/68 -- -- 85 -- 94 % -- --   09/30/21 0152 -- 99.6  F (37.6  C) Oral -- -- -- -- --   09/30/21 0130 107/65 -- -- 85 -- 93 % -- --   09/30/21 0120 111/65 -- -- -- -- 93 % -- --   09/30/21 0030 113/65 -- -- 94 -- 93 % -- --   09/30/21 0010 119/57 -- -- -- -- 96 % -- --   09/29/21 2350 -- -- -- -- -- 94 % -- --   09/29/21 2340 -- -- -- -- -- 94 % -- --   09/29/21 2330 114/69 -- -- 97 -- 95 % -- --   09/29/21 2300 120/82 -- -- 101 -- 97 % -- --   09/29/21 2227 106/57 (!) 102.9  F (39.4  C) -- 114 18 94 % 1.803 m (5' 11\") 89.4 kg (197 lb)       Physical Exam  General: Well appearing, nontoxic. Resting comfortably  Head:  Scalp, face, and head appear normal  Eyes:  Pupils are equal, round    Conjunctivae non-injected and sclerae white  ENT:    The external nose is normal    Pinnae are normal  Neck:  Normal range of motion    There is no rigidity noted    Trachea is in the midline  CV:  Regular rate and rhythm     Normal S1/S2, no S3/S4    No murmur or rub. Radial pulses 2+ bilaterally.  Resp:  Lungs are clear and equal bilaterally  There is no tachypnea    No increased work of breathing    No rales, wheezing, or rhonchi  GI:  Abdomen is soft, no rigidity or guarding    Mild distension. No mass    No tenderness or rebound tenderness.  Midline inferior abdominal HAYDEE drain clean dry and intact.  Scant amount (<5mL) of serosanguineous drainage in the bulb.  MS:  Normal muscular tone    Symmetric motor strength    No lower extremity " edema  Skin:  No rash or acute skin lesions noted  Neuro: Awake and alert  Speech is normal and fluent  Moves all extremities spontaneously  Psych:  Normal affect. Appropriate interactions.    Emergency Department Course   Imaging:  CT Abdomen Pelvis w/ IV contrast:  1. A 5 cm irregular-shaped low-attenuation mass in the right lobe of the liver, slightly increased in size since the recent comparison study dated 09/22/2021. This likely represents a hepatic abscess. There is also a tiny 1.3 cm low-attenuation structure    in the central aspect of the left lobe of liver that is too small to characterize, but may be slightly increased in size since the comparison study. This is nonspecific, but could also represent a tiny developing abscess.   2. No other acute abnormality identified in the abdomen or pelvis.  Per radiology.    Laboratory:  CBC: WBC 11.9 (H), HGB 13.1 (L),    CMP: Albumin 2.9 (L), GFR 56 (L) o/w WNL (Creatinine 1.35 (H))     Lactic acid (result time 2301) 2.0  Asymptomatic COVID19 Virus PCR by nasopharyngeal swab: negative     Blood culture x2: Pending     Emergency Department Course:    Reviewed:  I reviewed nursing notes, vitals, past medical history and care everywhere    Assessments:  2322 I obtained history and examined the patient as noted above.   0210 I rechecked the patient and explained findings.     Consults:   0155 I consulted with Dr. Mcgee of the hospitalist services who is in agreement to accept the patient for admission.   0230 I consulted with Dr. Fletcher, General Surgery, regarding the patient's history and presentation here in the emergency department.    Interventions:  2233 Acetaminophen, 975 mg, PO  2259 0.9% sodium chloride bolus, 1,000 mL, IV  0205 Ceftriaxone, 2 g, IV  0243 Metronidazole, 500 mg, IV    Disposition:  The patient was admitted to the hospital under the care of Dr. Mcgee.     Impression & Plan   Medical Decision Making:  Boom Daniel is a 61 year old male  who presents for evaluation of recurrent fever and chills and fatigue in the setting of recent perforated diverticulitis with intra-abdominal abscess status post HAYDEE drain placement and known hepatic phlegmon/abscess which was not previously drained.  Patient has been on Augmentin since discharge from the hospital and he reports compliance with this.  On my evaluation he is well-appearing, hemodynamically stable but presents to the emergency department febrile.  Abdominal exam is benign without evidence of peritonitis or acute surgical emergency.  Scant output from the HAYDEE drain.  A broad differential diagnosis is considered.  Ultimately work-up in the emergency department reveals evidence of worsening hepatic abscess with enlargement today compared to previous on CT scan.  The other abdominal abscesses appear to have resolved.  There is no residual abscess around the HAYDEE drain.  No respiratory symptoms to suggest pneumonia or respiratory infection.  Labs today reveal mild CHANA with creatinine increased to 1.35.  CBC with mild leukocytosis otherwise stable.  Prior cultures from the abscess grew Fusobacterium nucleatum which is pansensitive.  Given recurrent fevers and mild sepsis with persistent liver abscess despite outpatient antibiotics patient will require admission to the hospital for ongoing monitoring evaluation and treatment.  The case was discussed with the hospitalist as well as general surgery who will see the patient in consultation.  No indication for acute surgical intervention emergently at this time.  Patient was admitted in stable condition.      Covid-19  Boom Daniel was evaluated during a global COVID-19 pandemic, which necessitated consideration that the patient might be at risk for infection with the SARS-CoV-2 virus that causes COVID-19.   Applicable protocols for evaluation were followed during the patient's care.   COVID-19 was considered as part of the patient's evaluation.    Diagnosis:     ICD-10-CM    1. Liver abscess  K75.0    2. Sepsis without acute organ dysfunction, due to unspecified organism (H)  A41.9        Scribe Disclosure:  I, Paige Jiménez, am serving as a scribe at 11:06 PM on 9/29/2021 to document services personally performed by Shahbaz Pena MD based on my observations and the provider's statements to me.              Shahbaz Pena MD  09/30/21 1840

## 2021-09-30 NOTE — CONSULTS
Hospitalist consult placed for this complicated 61-year-old gentleman who was admitted from September 21 to September 25 with complicated diverticulitis with pelvic abscess as well as hepatic phlegmon.  Patient was followed while inpatient by the colorectal surgery service.  IR had placed a CT-guided drain in the abdominal abscess on 22 September.  Was discharged home on antibiotics with appropriate follow-up.  Apparently we presented to the hospital according to the chart notes due to ongoing fevers.  He was evaluated in the emergency department including a CT scan of the abdomen pelvis which shows progression of the hepatic phlegmon into abscess.  Appropriate consultation for interventional radiology to attempt image directed drainage.  If this is unsuccessful patient will require evaluation by hepatobiliary surgery likely at the [facility].  That said general surgery has no further input into this case and defer management of pelvic drain to colorectal surgery.  Please call if there are other questions

## 2021-09-30 NOTE — CONSULTS
[facility]  Colon and Rectal Surgery Consult Note  Name: Boom Daniel    MRN: [id number]  YOB: 1960    Age: 61 year old  Date of admission: 9/29/2021  Primary care provider: Medicine, [facility]     Requesting Physician:  Dr. Gore  Reason for consult:  Diverticulitis with pelvic and liver abscess           History of Present Illness:   Boom Daniel is a 61 year old male, seen at the request of Dr. Gore, who presents with recurrent fevers, chills and malaise. He has a past medical history of CKD 2, dyslipidemia, JARED on CPAP machine and history of tobacco use with cessation 1.5 years ago.  He was hospitalized at [facility] from 9 21-9 25 in which she was found to have acute diverticulitis with a pelvic abscess and hepatic phlegmon.  At the time he was also found to have a right hepatic vein thrombosis.  He underwent percutaneous drainage with IR of the pelvic abscess.  The phlegmon in the liver was not well-defined and unable to drain during that admission.  He received multiple days of IV antibiotics in the hospital and was ultimately discharged on oral antibiotics, Augmentin.  He states initially after discharge he did quite well at home.  He has been flushing his pelvic drain twice daily, with minimal outputs.  He was planned to have a CT sinogram as an outpatient today.  However yesterday evening he started developing fevers and chills as well as malaise.  He reports fevers up to 102.  In the emergency department he was noted to have a fever of 102.9 and was tachycardic at 114.  CT the abdomen pelvis with IV contrast was obtained which showed a 5 cm irregular shaped low-attenuation mass in the right lobe of the liver which is increased in size since recent CT scan on 922.  This is likely representing a hepatic abscess.  The pelvic fluid collection appears to be resolved.  Labs are significant for a white count of 11.9, hemoglobin 13.1 and platelets 412.  His  "albumin was 2.9 and his creatinine is 1.35 otherwise CMP was within normal limits.  He was admitted to the hospitalist service and CRS, ID and IR were consulted for further management.      Colonoscopy History:  He believes he had a colonoscopy between 5 and 10 years ago and that this was normal.  He does not recall the exact date of colonoscopy.  He believes this was done at [facility], however I am unable to find the records in care everywhere.    Past abdominal surgery: none            Past Medical History:   No past medical history on file.          Past Surgical History:   No past surgical history on file.            Social History:     Social History     Tobacco Use     Smoking status: Not on file   Substance Use Topics     Alcohol use: Not on file             Family History:   No family history on file.          Allergies:   No Known Allergies          Medications:       [START ON 10/1/2021] cefTRIAXone  2 g Intravenous Q24H     metroNIDAZOLE  500 mg Intravenous Q12H     pantoprazole (PROTONIX) IV  40 mg Intravenous Daily with breakfast     saccharomyces boulardii  250 mg Oral BID     sodium chloride (PF)  3 mL Intracatheter Q8H             Review of Systems:   A comprehensive greater than 10 system review of systems was carried out.  Pertinent positives and negatives are noted above.  Otherwise negative for contributory info.            Physical Exam:     Blood pressure 135/75, pulse 80, temperature 99  F (37.2  C), temperature source Oral, resp. rate 16, height 1.803 m (5' 11\"), weight 89.4 kg (197 lb), SpO2 94 %.    Intake/Output Summary (Last 24 hours) at 9/30/2021 1005  Last data filed at 9/30/2021 0527  Gross per 24 hour   Intake 1000 ml   Output --   Net 1000 ml     Exam:  General - Awake alert and oriented, appears stated age  Pulm - Non-labored breathing with normal respiratory effort  CVS - reg rate and rhythm, no peripheral edema  Abd - Soft, non tender, non distended.  No guarding, rigidity or " peritoneal signs. HAYDEE drain with small amount serosanguinous fluid present.   Rectal exam was not performed.   Neuro - CN II-XII grossly intact  Musculoskeletal - extremities with no clubbing, cyanosis or edema; able to ambulate  Psych - responsive, alert, cooperative; oriented x3; appropriate mood and affect  External/skin - inspection reveals no rashes, lesions or ulcers, normal coloring         Data Reviewed:     Recent Results (from the past 24 hour(s))   CT Abdomen Pelvis w Contrast    Narrative    EXAM: CT ABDOMEN AND PELVIS WITH CONTRAST  LOCATION: [facility]  DATE/TIME: 9/29/2021 11:45 PM    INDICATION: Recent perforated diverticulitis. Recurrent fever.  COMPARISON: 09/22/2021 - CT chest, abdomen and pelvis.    TECHNIQUE: CT scan of the abdomen and pelvis was performed following injection of IV contrast. Multiplanar reformats were obtained. Dose reduction techniques were used.  CONTRAST: 99 mL Isovue-370.    FINDINGS:    LOWER CHEST: Unremarkable.    HEPATOBILIARY: A 5.3 x 4.1 cm irregular-shaped mass is present in the posterior aspect of the right lobe of liver (series 3 image 41). The mass has areas of central homogeneous low-attenuation with a relatively thick periphery that is slightly higher in   attenuation, but hypoenhancing relative to the background liver. This mass has mildly increased in size since 09/22/2021 when it measured 4.8 x 3.9 cm. A linear low-attenuation structure coursing to the inferior aspect of the mass (series 3 image 49) was   present previously and likely represents an associated adjacent thrombosed intrahepatic vein. 1.3 cm low-attenuation focus in the central aspect of the left lobe of the liver (series 3 image 28), possibly slightly increased in size since the comparison   study when it measured 1.1 cm.    SPLEEN: Unremarkable.    PANCREAS: Unremarkable.    ADRENAL GLANDS: Unremarkable.    KIDNEYS/BLADDER: Unremarkable.    BOWEL: Very small hiatal  hernia. A few colonic diverticula are present without convincing evidence of acute diverticulitis. The appendix is unremarkable.    LYMPH NODES: Unremarkable.    PELVIC ORGANS: No acute findings.    MUSCULOSKELETAL: No acute findings.    OTHER: A percutaneous pigtail drainage catheter is present entering the midline anterior pelvic wall with distal tip in the anterior aspect of the intraperitoneal space, adjacent to the mid sigmoid colon. No fluid collection is present about the   catheter. Atherosclerotic calcification in the abdominal aorta.      Impression    IMPRESSION:   1. A 5 cm irregular-shaped low-attenuation mass in the right lobe of the liver, slightly increased in size since the recent comparison study dated 09/22/2021. This likely represents a hepatic abscess. There is also a tiny 1.3 cm low-attenuation structure   in the central aspect of the left lobe of liver that is too small to characterize, but may be slightly increased in size since the comparison study. This is nonspecific, but could also represent a tiny developing abscess.  2. No other acute abnormality identified in the abdomen or pelvis.       Recent Labs   Lab 09/29/21 2243 09/24/21  0538 09/23/21  1012   WBC 11.9* 9.2 11.2*   HGB 13.1* 12.7* 11.7*   HCT 40.6 37.9* 34.7*   MCV 86 83 85    263 214     Recent Labs   Lab 09/29/21 2242 09/24/21  0538    137   POTASSIUM 3.7 3.7   CHLORIDE 100 105   CO2 25 23   ANIONGAP 8 9   GLC 98 90   BUN 14 12   CR 1.35* 1.16   GFRESTIMATED 56* 68   BEREKET 8.6 8.6   MAG 2.0  --    PROTTOTAL 7.9  --    ALBUMIN 2.9*  --    BILITOTAL 0.6  --    ALKPHOS 138  --    AST 20  --    ALT 45  --          Assessment and Plan:   Boom Daniel is a 61 year old male with PMHx CKD 2, dyslipidemia, and JARED who presented with recurrent fevers and chills after recent admission from 9/21-9/25.  A repeat CT of the abdomen pelvis showed a more well-defined hepatic abscess.  From diverticulitis standpoint, his sigmoid  colon is noninflamed with no evidence of acute diverticulitis.  It appears that the previous fluid collection in the pelvis has now resolved.  From a colorectal surgery standpoint would recommend CT sinogram in next 1 to 2 days pending anticoagulation status.  If no fistula, the drain can likely be removed.  If there is a fistula to the bowel, would recommend keeping drain in place.  Will need prolonged IV antibiotics, appreciate ID recommendations.  Hepatic abscess resolved he will ultimately a colonoscopy and discussion of possible surgery in an elective setting.     Plan:  1. Admit to hospitalist service.   2. Surgery: No indication for urgent surgery at this time.  3. Diet: okay for diet from CRS standpoint, needs to be NPO pending timing of IR drainage of hepatic abscess  4. IV Fluids: Continue  5. Antibiotics:  Continue per ID  6. Medications:  Hold blood thinners  7. I&O s:  strict I&O s   8. Labs:   - Reviewed: Yes  - Ordered: none   9. Imaging:   - Dr. Catherine and myself have personally viewed: CT abd/pelvis  - Ordered:  CT sinogram  10. Activity:  OOB, ambulate as able  11. DVT prophylaxis: SCD s  12. This plan has been discussed with Dr. Catherine    Patient specific identified risk factors considered as part of today s evaluation include: CKD2, JARED, recent admission      Additional history obtained from chart review.  Time spent on consultation: 30 minutes, greater than 50% spent on counseling and/or coordination of care.    Ghada Langston (Hermes), JAZMIN  Colorectal Physician Assistant    [facility]  [address]  [place] [address]  T: [phone]  F: [phone]

## 2021-09-30 NOTE — PROGRESS NOTES
RECEIVING UNIT ED HANDOFF REVIEW    ED Nurse Handoff Report was reviewed by: Jia Connor RN on September 30, 2021 at 3:10 AM

## 2021-09-30 NOTE — PROVIDER NOTIFICATION
MD Notification    Notified Person: MD    Notified Person Name:  [place]    Notification Date/Time: 9/30/21 at 1235    Notification Interaction: pager    Purpose of Notification: Infectious Disease MD indicating  in her note that pt will need a midline placement by tomorrow. Can you place order for it?  Also, pt reports loose stools, do you think testing him for C-diff is appropriate?     Orders Received: Per Dr. Childress, ID will place order for midline. Order to hold off on C-diff testing at this time, and if pt starts to develop stomach/abdominal pain or cramping we should test pt for C-diff at that time.     Comments:

## 2021-09-30 NOTE — CONSULTS
[facility]    Infectious Disease Consultation     Date of Admission:  9/29/2021  Date of Consult : 09/30/21      Assessment:  1. Perforated sigmoid diverticulitis with neena diverticular abscess between sigmoid colon and bladder. S/p IR drain placement , pelvic fluid cx with Fusobacterium nucleatum  2. Right hepatic lobe abscess is probably the cause for fever. Additional lesion noted in the left hepatic lobe which will need to be followed.   3. Fever/ leukocytosis   4. Partially thrombosed intrahepatic vein in the right hepatic lobe  5. Incidental note of pulmonary nodule which will need follow up.  6. Tobacco abuse hitory and pulmonary emphysema  7. CKD with current creatinine of 1.35     Recommendations:  1. IR to assess for drainage of liver abscess  2. Ceftriaxone / Metronidazole -transition to PO Metronidazole  3. Will need IV antibiotics at discharge  4. Plan midline by tomorrow if blood cxs remain negative  5. Colorectal surgery following    Marlen Barnes MD    Reason for Consult    I was asked to evaluate this patient for antimicrobial recommendations for liver abscess , perforated diverticulitis.    Primary Care Physician   [facility]    Chief Complaint   Fever and chills    History is obtained from the patient and medical records    History of Present Illness   Boom Daniel is a 61 year old male with CKD and sleep apnea who was hospitalized from 9/21-9/25 for diarrhea and fever related to perforated diverticulitis with liver phlegmon. He was treated with IV antibiotics, had an abdominal drain placed -cx grew Fusobacterium nucleatum , IV antibiotics were discussed for home discharge but patient had no insurance and wanted to discharge on oral therapy. He was discharged on Augmentin and did well for a few days, but developed fever and chills again yesterday and presented for admission. He has mild leukocytosis and CT scan showed increasing size of liver abscess. He  has been initiated on Ceftriaxone/ Metronidazole and ID has been asked to assist with antibiotic management.    He denies abdominal pain, nausea or vomiting, but did have some more diarrhea along with fever and chills.    Antimicrobial therapy  9/22-9/24, 9/29 Ceftriaxone / Metronidazole  Prior  9/24-9/29 augmentin  9/22 Zosyn    Past Medical History   I have reviewed this patient's medical history and updated it with pertinent information if needed.   No past medical history on file.    Past Surgical History   I have reviewed this patient's surgical history and updated it with pertinent information if needed.  No past surgical history on file.    Prior to Admission Medications   Prior to Admission Medications   Prescriptions Last Dose Informant Patient Reported? Taking?   amoxicillin-clavulanate (AUGMENTIN) 875-125 MG tablet   No No   Sig: Take 1 tablet by mouth 2 times daily for 28 days   apixaban ANTICOAGULANT (ELIQUIS) 5 MG tablet   No No   Sig: Take 2 tablets (10 mg) by mouth 2 times daily for 14 doses   apixaban ANTICOAGULANT (ELIQUIS) 5 MG tablet   No No   Sig: Take 1 tablet (5 mg) by mouth 2 times daily   losartan (COZAAR) 50 MG tablet   Yes No   Sig: Take 50 mg by mouth daily   saccharomyces boulardii (FLORASTOR) 250 MG capsule   No No   Sig: Take 1 capsule (250 mg) by mouth 2 times daily for 28 days      Facility-Administered Medications: None     Allergies   No Known Allergies    Immunization History     There is no immunization history on file for this patient.    Social History   I have reviewed this patient's social history and updated it with pertinent information if needed. Boom Daniel      Family History   I have reviewed this patient's family history and updated it with pertinent information if needed.   No family history on file.    Review of Systems   The 10 point Review of Systems is negative other than noted in the HPI or here.     Physical Exam   Temp: 99  F (37.2  C) Temp src: Oral BP:  135/75 Pulse: 80   Resp: 16 SpO2: 94 % O2 Device: None (Room air)    Vital Signs with Ranges  Temp:  [98.1  F (36.7  C)-102.9  F (39.4  C)] 99  F (37.2  C)  Pulse:  [] 80  Resp:  [16-18] 16  BP: (106-138)/(57-82) 135/75  SpO2:  [93 %-97 %] 94 %  197 lbs 0 oz  Body mass index is 27.48 kg/m .    GENERAL APPEARANCE:  awake  EYES: Eyes grossly normal to inspection, PERRL and conjunctivae and sclerae normal  NECK: symmetric  RESP: lungs clear to auscultation - no rales, rhonchi or wheezes  CV: regular rates and rhythm, normal S1 S2, no S3 or S4 and no murmur, click or rub  ABDOMEN: soft, non tender, abdominal drain in place  MS: extremities normal- no gross deformities noted  SKIN: no suspicious lesions or rashes      Data   All laboratory data reviewed  Component      Latest Ref Rng & Units 9/29/2021   WBC      4.0 - 11.0 10e3/uL 11.9 (H)   RBC Count      4.40 - 5.90 10e6/uL 4.71   Hemoglobin      13.3 - 17.7 g/dL 13.1 (L)   Hematocrit      40.0 - 53.0 % 40.6   MCV      78 - 100 fL 86   MCH      26.5 - 33.0 pg 27.8   MCHC      31.5 - 36.5 g/dL 32.3   RDW      10.0 - 15.0 % 13.1   Platelet Count      150 - 450 10e3/uL 412     Component      Latest Ref Rng & Units 9/29/2021   Sodium      133 - 144 mmol/L 133   Potassium      3.4 - 5.3 mmol/L 3.7   Chloride      94 - 109 mmol/L 100   Carbon Dioxide      20 - 32 mmol/L 25   Anion Gap      3 - 14 mmol/L 8   Urea Nitrogen      7 - 30 mg/dL 14   Creatinine      0.66 - 1.25 mg/dL 1.35 (H)   Calcium      8.5 - 10.1 mg/dL 8.6   Glucose      70 - 99 mg/dL 98   Alkaline Phosphatase      40 - 150 U/L 138   AST      0 - 45 U/L 20   ALT      0 - 70 U/L 45   Protein Total      6.8 - 8.8 g/dL 7.9   Albumin      3.4 - 5.0 g/dL 2.9 (L)   Bilirubin Total      0.2 - 1.3 mg/dL 0.6   GFR Estimate      >60 mL/min/1.73m2 56 (L)     Micro  9/30 blood cx pending  prior  9/22 abdominal aspirate  Culture 4+ Fusobacterium nucleatumAbnormal             Resulting Agency: [facility]        Susceptibility     Fusobacterium nucleatum     MAYTE     Amoxicillin/Clavulanate 0.016 ug/mL Susceptible     Cefotaxime 0.023 ug/mL Susceptible     Clindamycin 0.38 ug/mL Susceptible     metronidazole <=0.016 ug/mL Susceptible     Penicillin <=0.016 ug/mL Susceptible           Imaging  9/29 Ct abdomen/pelvis  EXAM: CT ABDOMEN AND PELVIS WITH CONTRAST  LOCATION: [facility]  DATE/TIME: 9/29/2021 11:45 PM     INDICATION: Recent perforated diverticulitis. Recurrent fever.  COMPARISON: 09/22/2021 - CT chest, abdomen and pelvis.     TECHNIQUE: CT scan of the abdomen and pelvis was performed following injection of IV contrast. Multiplanar reformats were obtained. Dose reduction techniques were used.  CONTRAST: 99 mL Isovue-370.     FINDINGS:     LOWER CHEST: Unremarkable.     HEPATOBILIARY: A 5.3 x 4.1 cm irregular-shaped mass is present in the posterior aspect of the right lobe of liver (series 3 image 41). The mass has areas of central homogeneous low-attenuation with a relatively thick periphery that is slightly higher in   attenuation, but hypoenhancing relative to the background liver. This mass has mildly increased in size since 09/22/2021 when it measured 4.8 x 3.9 cm. A linear low-attenuation structure coursing to the inferior aspect of the mass (series 3 image 49) was   present previously and likely represents an associated adjacent thrombosed intrahepatic vein. 1.3 cm low-attenuation focus in the central aspect of the left lobe of the liver (series 3 image 28), possibly slightly increased in size since the comparison   study when it measured 1.1 cm.     SPLEEN: Unremarkable.     PANCREAS: Unremarkable.     ADRENAL GLANDS: Unremarkable.     KIDNEYS/BLADDER: Unremarkable.     BOWEL: Very small hiatal hernia. A few colonic diverticula are present without convincing evidence of acute diverticulitis. The appendix is unremarkable.     LYMPH NODES: Unremarkable.     PELVIC ORGANS: No acute  findings.     MUSCULOSKELETAL: No acute findings.     OTHER: A percutaneous pigtail drainage catheter is present entering the midline anterior pelvic wall with distal tip in the anterior aspect of the intraperitoneal space, adjacent to the mid sigmoid colon. No fluid collection is present about the   catheter. Atherosclerotic calcification in the abdominal aorta.                                                                      IMPRESSION:   1. A 5 cm irregular-shaped low-attenuation mass in the right lobe of the liver, slightly increased in size since the recent comparison study dated 09/22/2021. This likely represents a hepatic abscess. There is also a tiny 1.3 cm low-attenuation structure   in the central aspect of the left lobe of liver that is too small to characterize, but may be slightly increased in size since the comparison study. This is nonspecific, but could also represent a tiny developing abscess.  2. No other acute abnormality identified in the abdomen or pelvis.

## 2021-09-30 NOTE — H&P
[facility]    History and Physical - Hospitalist Service       Date of Admission:  9/29/2021    Assessment & Plan      Boom Daniel is a 61 year old male admitted on 9/29/2021. He presents to the emergency department with recurrent fevers and shaking chills after recent hospitalization for complicated diverticulitis with intra-abdominal abscess formation and drain as well as hepatic phlegmon.  On imaging, phlegmon has developed into an approximately 5 cm hepatic abscess.    Hepatic abscess with associated severe sepsis: Tachycardia to 115, fevers of 102.9, shaking chills, leukocytosis to 11,9K, lactic acid of 2.  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  At last admission was discharged with plan for 4 weeks twice daily Augmentin, but when that time, patient had interval improvement and subsequent recurrence of fevers and chills now with enlarging hepatic abscess despite antibiotic treatments.  -Holding prior to admission apixaban.  Patient did take his 9/29 evening dose  -IR consult placed for hepatic abscess drainage; will need to contact IR team in a.m. regarding timing of drainage in the setting of apixaban.  This will help determine duration of n.p.o. status and initiation of heparin while awaiting procedure.  If patient remains clinically stable, I anticipate heparinization over Thursday with IR procedure and drainage on Friday.  Patient aware of this likelihood as well.  Addendum: discussed w/ IR 9/30 AM; no procedure today. Okay for diet and heparin ggt (would initiate when next due for apixiban). Discussed with oncoming physician.  -With interval increasing hepatic abscess, changing Augmentin/Unasyn to clindamycin and metronidazole.  Discussed with Dr. Pena in the emergency department (did not receive initial unasyn dose ordered in ER).  As above, most likely causative agent is Fusobacterium from prior positive blood culture, though this  should be sensitive to metronidazole as well   -Consult infectious disease re: antibiotic change.  Was followed at last admission as well. Note history of MRSA; this was actually off of a right finger wound/abscess (not a nasal swab) through [facility] in 2012.  Resistant to Bactrim, penicillin, oxacillin.  Has not had recurrent skin abscesses and low suspicion that hepatic abscess is MRSA in the clinical context of recent perforated diverticulitis.  -Blood cultures x2 pending  -Currently stable, though with hepatic abscess, anticipate patient may have quite labile blood pressures and fevers during episodes of bacteremia.  Discussed with patient as well as his wife at bedside, and patient apparently had some of these events during last admission as well.  Note that patient may require IMC status following interventional procedure, as it would not be uncommon to have acute clinical worsening/bacteremia with drainage attempt.  -Trend CMP  -Appreciate cultures from hepatic abscess drainage if able  -General surgery consulted given potential septation of hepatic abscess.  Will first primarily attempt interventional drainage     Hypertension: Currently normotensive, borderline low blood pressures with fevers  -Prior to admission losartan to be held given risk of intermittent hypotension with bacteremia.  Did have labile blood pressures during last admission as well    Complicated sigmoid diverticulitis with abscess: Hospitalized 9/21-9/25 with CT-guided drain placement to intra-abdominal abscess 9/22  -At last admission was followed by colorectal surgery.  With general surgery consult for hepatic abscess as above, have not reconsulted colorectal surgery.  Will request general surgery assess pelvic drain for potential removal with or without CT sinogram  -Outpatient colonoscopy is being arranged by colorectal surgery as of last admission  -Switching Augmentin to ceftriaxone and Flagyl for now     Right hepatic  "vein thrombosis: Followed by vascular medicine at last admission with plan for apixaban anticoagulation and follow-up venogram of abdomen and D-dimer in 3 months to determine ability to discontinue anticoagulation. Thought to have \"not high risk\" of thrombus extension into intrahepatic vein at last admission per vascular medicine.  -Prior to admission apixaban on hold for IR procedure.  Took his  evening dose.    -Following discussion with interventional radiology in a.m. re: drain timing, anticipate initiation of heparin until procedural drainage 10/1.      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  Tells me that his father  of a heart attack in his 40s.  Elevated triglycerides and total cholesterol with low HDL on  lipid panel through outside system  -Discussed importance of establishing primary care with patient in the emergency department  -Following recovery from hepatic abscess, patient should have lipid panel checked, likely initiation on statin therapy  -Currently anticoagulated with apixaban, though anticipate initiation of low-dose aspirin with early coronary artery disease family history, hypertension, prior hyperlipidemia history when apixaban likely discontinued in 3 months.  Await lipid panel as above for risk calculation, though anticipate patient will fall into the 20% 10-year risk range by AHA ASCVD risk calculator even if you exclude smoker related to his recent tobacco cessation.    Acute kidney injury, stage II chronic kidney disease: Patient with a creatinine of 1.35.  At discharge creatinine was 1.16.  Wife and patient report poor appetite over the past week.  -D5 normal saline at 100/h.  Note that patient had a single hypoglycemic episode during last admission likely related to hypermetabolic state and hepatic abscess.  -1 L normal saline bolus   -Trend CMP as " above    GERD: Takes Pepcid at home.  For the past 3 weeks has had an intermittent cough.  He does tell me, however that he has been off of his Pepcid as he has not been feeling well with his diverticulitis, and acid reflux might be contributing to cough.  No infiltrate noted in the lung bases on CT abdomen or on CT chest at last admission.  -IV Protonix daily with Tums available as needed       Diet:  Regular.diet as tolerated, n.p.o. at breakfast  DVT Prophylaxis: Pneumatic Compression Devices.  Apixaban held, anticipate initiation of heparin preprocedure following discussion with IR team  Rausch Catheter: Not present  Central Lines: None  Code Status:  Full code.  Confirmed with patient on admission    Clinically Significant Risk Factors Present on Admission             # Coagulation Defect: home medication list includes an anticoagulant medication       Disposition Plan   Expected discharge: 10/3/21 recommended to prior living arrangement once antibiotic plan established and SIRS/Sepsis treated.     The patient's care was discussed with the Patient and Dr. Pena in the emergency department.    Isaias Mcgee MD  [facility]  Securely message with the Vocera Web Console (learn more here)  Text page via [employer] Paging/Directory      ______________________________________________________________________    Chief Complaint   Fevers and chills, malaise    History is obtained from patient, patient's wife at bedside, discussion with Dr. Pena in the emergency department, chart review    History of Present Illness   Boom Daniel is a 61 year old male who presents to the emergency department for evaluation of fevers and shaking chills, temperatures in the 102 range as well as malaise.  Patient was recently hospitalized 9/21-9/25 here at [facility] for diverticulitis complicated by pelvic abscess and hepatic phlegmon later identified as ultrasound as small abscesses not amenable to  IR drainage.  Pelvic abscess was drained, patient was followed by colorectal surgery with plan for reassessment of pelvic drain 9/30 including sinogram and possible removal, followed also by infectious disease service with plan for 4 weeks of Augmentin and outpatient follow-up for hepatic abscesses.  During admission, patient had Fusobacterium bacteremia, pansensitive.  Patient also was noted to have right hepatic vein thrombosis for which vascular medicine followed, and patient was initiated on apixaban for likely 3-month course prior to repeat assessment of thrombosis and potential discontinuation.    Patient tells me that when he went home, he was feeling better, continued to feel better for several days until 9/29 when he again developed fevers into the 102 range and shaking chills.  He continues to have poor appetite over the past weeks, and is weak from his recent hospitalization as compared to his baseline, though generally felt he was improving.  With recurrence of fevers and chills, patient represented to the emergency department where CT imaging was notable for essentially resolved pelvic abscess, though enlargement of hepatic abscess now to approximately 5 cm.  Admission was requested for likely interventional radiology drainage of hepatic abscess.  On review of imaging, concern for possible septation versus 2 confluent abscesses of liver.  Dr. Pena did discuss with surgery as well regarding this as he might require procedural intervention if septated abscess or multiple adjacent abscesses.    Patient did take his apixaban 9/29 evening.  As such, anticipate interventional drainage might need to wait until 10/1 with interval heparinization following discussion with IR in a.m.  Patient and wife aware and understanding.    Patient currently feels well.  He has minimal to no abdominal pain.  He notes that he had minimal pain during last admission as well.  Pelvic drain with no significant drainage at this  juncture.    Patient does have a cough which wife reports is productive for some white sputum intermittently.  I am told he has had this for approximately 3 weeks.  Had CT imaging of the chest last admission without pneumonia.  Patient does have a history of acid reflux and has been off of his over-the-counter Prilosec.  GERD might be contributing.  Could also consider diaphragmatic irritation with liver abscess.  Patient tells me that he has not been on his Protonix first because he was not eating well when ill, though after medications were initiated during hospitalization, he was uncertain if it was still appropriate to take this medication in combination with his antibiotics and anticoagulant.  He does not have a primary care provider.    Review of Systems    The 10 point Review of Systems is negative other than noted in the HPI or here.  No nausea or vomiting  Poor appetite remains  Malaise present    Past Medical History    I have reviewed this patient's medical history and updated it with pertinent information if needed.   Complicated diverticulitis    Past Surgical History   I have reviewed this patient's surgical history and updated it with pertinent information if needed.  Intra-abdominal drain placed for abscess from perforated diverticulitis; reports no other prior surgeries    Social History   I have reviewed this patient's social history and updated it with pertinent information if needed.   Social History     Tobacco Use     Smoking status:  Former smoker, quit 1-1/2 years ago   Substance Use Topics     Alcohol use: Several times per week, ~2 drinks per sitting. Not daily, no withdrawal     Drug use: No       Family History     Mother is in her mid 90s and healthy  Father  of a myocardial infarction in his 40s    Prior to Admission Medications   Prior to Admission Medications   Prescriptions Last Dose Informant Patient Reported? Taking?   amoxicillin-clavulanate (AUGMENTIN) 875-125 MG tablet   No  No   Sig: Take 1 tablet by mouth 2 times daily for 28 days   apixaban ANTICOAGULANT (ELIQUIS) 5 MG tablet   No No   Sig: Take 2 tablets (10 mg) by mouth 2 times daily for 14 doses   apixaban ANTICOAGULANT (ELIQUIS) 5 MG tablet   No No   Sig: Take 1 tablet (5 mg) by mouth 2 times daily   losartan (COZAAR) 50 MG tablet   Yes No   Sig: Take 50 mg by mouth daily   saccharomyces boulardii (FLORASTOR) 250 MG capsule   No No   Sig: Take 1 capsule (250 mg) by mouth 2 times daily for 28 days      Facility-Administered Medications: None     Allergies   No Known Allergies    Physical Exam   Vital Signs: Temp: 99.6  F (37.6  C) Temp src: Oral BP: 107/65 Pulse: 85   Resp: 18 SpO2: 93 %      Weight: 197 lbs 0 oz    General Appearance: Generally well-appearing 61-year-old male resting comfortably in bed.  He does not appear ill or toxic, though previously with very high fever with shaking chills and appears quite unwell during these events.  Somewhat typical for hepatic abscess and anticipated transient bacteremia episodes  Eyes: No scleral icterus or injection  HEENT: Normocephalic, atraumatic; mild hoarseness of voice reported which might be related to untreated GERD, though not overtly identified by provider without this history  Respiratory: Breath sounds are clear bilateral to auscultation, no wheezes or crackles.  Patient occasionally has a cough which is nonproductive  Cardiovascular: Regular rate and rhythm with heart rate in the 80s.  No appreciable murmur  GI: Abdomen soft, nontender to palpation.  Some abdominal obesity is present.  No rebound, no rigidity, no guarding.  No palpable mass.  Bowel sounds are normal active.  Patient with a bulb drain placed suprapubic with currently scant drainage in tubing and no irritation surrounding drain placement site  Lymph/Hematologic: No lower extremity edema  Skin: No rashes, no jaundice, no petechiae.  Patient with a scab overlying his left mid shin which does not appear  erythematous or infectious; reports recent minor trauma from bumping into something  Musculoskeletal: Muscular tone and bulk intact and appropriate for age  Neurologic: Alert, conversant, appropriate conversation.  Mental status grossly intact  Psychiatric: Very pleasant, normal affect    Data   Data reviewed today: I reviewed all medications, new labs and imaging results over the last 24 hours. I personally reviewed the abdominal CT image(s) showing Hypoattenuation likely representing abscess in dome of liver, question septation mid abscess/lesion.  Approximately 5 cm diameter.    Recent Labs   Lab 09/29/21 2243 09/29/21  2242 09/24/21  0538 09/23/21  1012 09/23/21  0622 09/23/21  0622   WBC 11.9*  --  9.2 11.2*   < > 11.2*   HGB 13.1*  --  12.7* 11.7*   < > 11.3*   MCV 86  --  83 85   < > 84     --  263 214   < > 211   NA  --  133 137  --   --  136   POTASSIUM  --  3.7 3.7  --   --  3.7   CHLORIDE  --  100 105  --   --  106   CO2  --  25 23  --   --  22   BUN  --  14 12  --   --  14   CR  --  1.35* 1.16  --   --  1.20   ANIONGAP  --  8 9  --   --  8   BEREKET  --  8.6 8.6  --   --  8.3*   GLC  --  98 90  --   --  89   ALBUMIN  --  2.9*  --   --   --   --    PROTTOTAL  --  7.9  --   --   --   --    BILITOTAL  --  0.6  --   --   --   --    ALKPHOS  --  138  --   --   --   --    ALT  --  45  --   --   --   --    AST  --  20  --   --   --   --     < > = values in this interval not displayed.

## 2021-10-01 ENCOUNTER — HOME INFUSION (PRE-WILLOW HOME INFUSION) (OUTPATIENT)
Dept: PHARMACY | Facility: CLINIC | Age: 61
End: 2021-10-01

## 2021-10-01 ENCOUNTER — APPOINTMENT (OUTPATIENT)
Dept: CT IMAGING | Facility: CLINIC | Age: 61
End: 2021-10-01
Attending: PHYSICIAN ASSISTANT
Payer: COMMERCIAL

## 2021-10-01 ENCOUNTER — APPOINTMENT (OUTPATIENT)
Dept: ULTRASOUND IMAGING | Facility: CLINIC | Age: 61
End: 2021-10-01
Attending: INTERNAL MEDICINE
Payer: COMMERCIAL

## 2021-10-01 LAB
ALBUMIN SERPL-MCNC: 2.4 G/DL (ref 3.4–5)
ALP SERPL-CCNC: 110 U/L (ref 40–150)
ALT SERPL W P-5'-P-CCNC: 31 U/L (ref 0–70)
ANION GAP SERPL CALCULATED.3IONS-SCNC: 8 MMOL/L (ref 3–14)
AST SERPL W P-5'-P-CCNC: 14 U/L (ref 0–45)
BILIRUB SERPL-MCNC: 0.4 MG/DL (ref 0.2–1.3)
BUN SERPL-MCNC: 10 MG/DL (ref 7–30)
CALCIUM SERPL-MCNC: 8.2 MG/DL (ref 8.5–10.1)
CHLORIDE BLD-SCNC: 109 MMOL/L (ref 94–109)
CO2 SERPL-SCNC: 20 MMOL/L (ref 20–32)
CREAT SERPL-MCNC: 1.19 MG/DL (ref 0.66–1.25)
ERYTHROCYTE [DISTWIDTH] IN BLOOD BY AUTOMATED COUNT: 13.2 % (ref 10–15)
GFR SERPL CREATININE-BSD FRML MDRD: 66 ML/MIN/1.73M2
GLUCOSE BLD-MCNC: 114 MG/DL (ref 70–99)
HCT VFR BLD AUTO: 36.3 % (ref 40–53)
HGB BLD-MCNC: 11.8 G/DL (ref 13.3–17.7)
MCH RBC QN AUTO: 27.8 PG (ref 26.5–33)
MCHC RBC AUTO-ENTMCNC: 32.5 G/DL (ref 31.5–36.5)
MCV RBC AUTO: 86 FL (ref 78–100)
PLATELET # BLD AUTO: 358 10E3/UL (ref 150–450)
POTASSIUM BLD-SCNC: 4 MMOL/L (ref 3.4–5.3)
PROT SERPL-MCNC: 7 G/DL (ref 6.8–8.8)
RBC # BLD AUTO: 4.24 10E6/UL (ref 4.4–5.9)
SODIUM SERPL-SCNC: 137 MMOL/L (ref 133–144)
WBC # BLD AUTO: 10.1 10E3/UL (ref 4–11)

## 2021-10-01 PROCEDURE — 999N000154 HC STATISTIC RADIOLOGY XRAY, US, CT, MAR, NM

## 2021-10-01 PROCEDURE — 250N000013 HC RX MED GY IP 250 OP 250 PS 637: Performed by: SPECIALIST

## 2021-10-01 PROCEDURE — 80053 COMPREHEN METABOLIC PANEL: CPT | Performed by: INTERNAL MEDICINE

## 2021-10-01 PROCEDURE — 272N000433 HC KIT CATH IV 18 OR 20G CM, POWERGLIDE W MAX BARRIER

## 2021-10-01 PROCEDURE — 99152 MOD SED SAME PHYS/QHP 5/>YRS: CPT

## 2021-10-01 PROCEDURE — 87070 CULTURE OTHR SPECIMN AEROBIC: CPT | Performed by: INTERNAL MEDICINE

## 2021-10-01 PROCEDURE — 0F913ZZ DRAINAGE OF RIGHT LOBE LIVER, PERCUTANEOUS APPROACH: ICD-10-PCS | Performed by: RADIOLOGY

## 2021-10-01 PROCEDURE — 36569 INSJ PICC 5 YR+ W/O IMAGING: CPT

## 2021-10-01 PROCEDURE — 250N000011 HC RX IP 250 OP 636: Performed by: PHYSICIAN ASSISTANT

## 2021-10-01 PROCEDURE — 36415 COLL VENOUS BLD VENIPUNCTURE: CPT | Performed by: INTERNAL MEDICINE

## 2021-10-01 PROCEDURE — 85027 COMPLETE CBC AUTOMATED: CPT | Performed by: INTERNAL MEDICINE

## 2021-10-01 PROCEDURE — 250N000009 HC RX 250: Performed by: SPECIALIST

## 2021-10-01 PROCEDURE — 250N000011 HC RX IP 250 OP 636: Performed by: INTERNAL MEDICINE

## 2021-10-01 PROCEDURE — 250N000013 HC RX MED GY IP 250 OP 250 PS 637: Performed by: INTERNAL MEDICINE

## 2021-10-01 PROCEDURE — 250N000009 HC RX 250: Performed by: RADIOLOGY

## 2021-10-01 PROCEDURE — 99233 SBSQ HOSP IP/OBS HIGH 50: CPT | Performed by: INTERNAL MEDICINE

## 2021-10-01 PROCEDURE — C9113 INJ PANTOPRAZOLE SODIUM, VIA: HCPCS | Performed by: INTERNAL MEDICINE

## 2021-10-01 PROCEDURE — 120N000001 HC R&B MED SURG/OB

## 2021-10-01 PROCEDURE — 20501 NJX SINUS TRACT DIAGNOSTIC: CPT

## 2021-10-01 PROCEDURE — 258N000003 HC RX IP 258 OP 636: Performed by: INTERNAL MEDICINE

## 2021-10-01 PROCEDURE — 272N000431 US DRAIN OF SEROMA/HEMATOMA/ABSCESS/CYST

## 2021-10-01 PROCEDURE — 87075 CULTR BACTERIA EXCEPT BLOOD: CPT | Performed by: INTERNAL MEDICINE

## 2021-10-01 RX ORDER — NALOXONE HYDROCHLORIDE 0.4 MG/ML
0.4 INJECTION, SOLUTION INTRAMUSCULAR; INTRAVENOUS; SUBCUTANEOUS
Status: DISCONTINUED | OUTPATIENT
Start: 2021-10-01 | End: 2021-10-01

## 2021-10-01 RX ORDER — FENTANYL CITRATE 50 UG/ML
25-50 INJECTION, SOLUTION INTRAMUSCULAR; INTRAVENOUS EVERY 5 MIN PRN
Status: DISCONTINUED | OUTPATIENT
Start: 2021-10-01 | End: 2021-10-01

## 2021-10-01 RX ORDER — LIDOCAINE HYDROCHLORIDE 10 MG/ML
10 INJECTION, SOLUTION EPIDURAL; INFILTRATION; INTRACAUDAL; PERINEURAL ONCE
Status: COMPLETED | OUTPATIENT
Start: 2021-10-01 | End: 2021-10-01

## 2021-10-01 RX ORDER — LOSARTAN POTASSIUM 50 MG/1
50 TABLET ORAL DAILY
Status: DISCONTINUED | OUTPATIENT
Start: 2021-10-01 | End: 2021-10-03 | Stop reason: HOSPADM

## 2021-10-01 RX ORDER — DIPHENHYDRAMINE HYDROCHLORIDE 50 MG/ML
25 INJECTION INTRAMUSCULAR; INTRAVENOUS EVERY 6 HOURS PRN
Status: DISCONTINUED | OUTPATIENT
Start: 2021-10-01 | End: 2021-10-03 | Stop reason: HOSPADM

## 2021-10-01 RX ORDER — NALOXONE HYDROCHLORIDE 0.4 MG/ML
0.2 INJECTION, SOLUTION INTRAMUSCULAR; INTRAVENOUS; SUBCUTANEOUS
Status: DISCONTINUED | OUTPATIENT
Start: 2021-10-01 | End: 2021-10-01

## 2021-10-01 RX ORDER — DIPHENHYDRAMINE HCL 25 MG
25 CAPSULE ORAL EVERY 6 HOURS PRN
Status: DISCONTINUED | OUTPATIENT
Start: 2021-10-01 | End: 2021-10-03 | Stop reason: HOSPADM

## 2021-10-01 RX ORDER — FLUMAZENIL 0.1 MG/ML
0.2 INJECTION, SOLUTION INTRAVENOUS
Status: DISCONTINUED | OUTPATIENT
Start: 2021-10-01 | End: 2021-10-01

## 2021-10-01 RX ADMIN — DEXTROSE AND SODIUM CHLORIDE: 5; 900 INJECTION, SOLUTION INTRAVENOUS at 00:05

## 2021-10-01 RX ADMIN — PANTOPRAZOLE SODIUM 40 MG: 40 INJECTION, POWDER, FOR SOLUTION INTRAVENOUS at 08:51

## 2021-10-01 RX ADMIN — Medication 250 MG: at 21:59

## 2021-10-01 RX ADMIN — Medication 250 MG: at 08:51

## 2021-10-01 RX ADMIN — LIDOCAINE HYDROCHLORIDE ANHYDROUS 2 ML: 10 INJECTION, SOLUTION INFILTRATION at 21:20

## 2021-10-01 RX ADMIN — FENTANYL CITRATE 50 MCG: 50 INJECTION, SOLUTION INTRAMUSCULAR; INTRAVENOUS at 09:50

## 2021-10-01 RX ADMIN — LOSARTAN POTASSIUM 50 MG: 50 TABLET, FILM COATED ORAL at 18:29

## 2021-10-01 RX ADMIN — METRONIDAZOLE 500 MG: 500 TABLET ORAL at 21:59

## 2021-10-01 RX ADMIN — DEXTROSE AND SODIUM CHLORIDE: 5; 900 INJECTION, SOLUTION INTRAVENOUS at 22:01

## 2021-10-01 RX ADMIN — METRONIDAZOLE 500 MG: 500 TABLET ORAL at 08:51

## 2021-10-01 RX ADMIN — LIDOCAINE HYDROCHLORIDE 10 ML: 10 INJECTION, SOLUTION EPIDURAL; INFILTRATION; INTRACAUDAL; PERINEURAL at 09:50

## 2021-10-01 RX ADMIN — MIDAZOLAM 1 MG: 1 INJECTION INTRAMUSCULAR; INTRAVENOUS at 09:49

## 2021-10-01 RX ADMIN — CEFTRIAXONE SODIUM 2 G: 2 INJECTION, POWDER, FOR SOLUTION INTRAMUSCULAR; INTRAVENOUS at 00:01

## 2021-10-01 ASSESSMENT — ACTIVITIES OF DAILY LIVING (ADL)
ADLS_ACUITY_SCORE: 3

## 2021-10-01 NOTE — PROVIDER NOTIFICATION
Prescriber Notification Note    The pharmacist has communicated with this patient's provider regarding a concern or therapy recommendation.    Notified Person: Dr. Childress  Date/Time of Notification: 10/1/21 17:30  Interaction: phone  Concern/Recommendation: Clarified with Dr. Childress that patient is not on heparin, heparin (or other anticoagulant) has not been administered during this admission.     Comments/Additional Details: Hold on anticoagulation this evening post liver aspiration and drain removal. Leave notification for hospitalist following patient tomorrow to evaluate if appropriate to resume apixaban or initiate heparin..

## 2021-10-01 NOTE — PROGRESS NOTES
Radiology Note:    Order has been received in radiology department for a liver abscess drain placement. Imaging was reviewed and procedure approved by Dr Allison. Lab results are acceptable to proceed with procedure.   Recent Labs   Lab 10/01/21  0701      HGB 11.8*   CR 1.19     Recent Labs   Lab Test 09/22/21  1002   INR 1.23*     - COVID status: negative 9/30/21  - NPO status: Appropriately NPO for procedure.   - Anticoagulation status: Patient was on Eliquis PTA, last dose was 9/29 evening. Given clinical picture, benefit of proceeding with drain placement outweighs risk of holding one additional dose. Dr Allison ok to proceed.      After discussing the procedure, risks, benefits, recovery and expectations with the patient, consent will obtained prior to procedure. Sedation assessment to be documented separately.     Procedure will be performed in the US department with procedural sedation this morning.    Gina Bautista PA-C  Radiology  Pager #: [phone]

## 2021-10-01 NOTE — PLAN OF CARE
Pt is A and O X4. VSS on RA. Denies pain, nausea, and lightheadedness. Pt had ultrasound guided needle aspiration of liver abscess, site to RUQ covered with band-aid, all CDI. Diet advanced to Low fiber this afternoon, tolerating diet well.  No wheezing noted today. HAYDEE drain also removed today, dressing CDI.  PIV  infusing D5+NS at 100 ml/hr. Will need midline for long term IV Abx treatment. Plan for mid-line placement tonight at 19:00-pt aware.  ID following.  Discharge pending.

## 2021-10-01 NOTE — PROGRESS NOTES
[facility]    Infectious Disease Progress Note    Date of Service : 10/01/2021     Assessment:  1. Perforated sigmoid diverticulitis with neena diverticular abscess between sigmoid colon and bladder. S/p IR drain placement , pelvic fluid cx with Fusobacterium nucleatum  2. Right hepatic lobe abscess s/p aspiration today. Drain could not be placed.   3. Fever/ leukocytosis   4. Partially thrombosed intrahepatic vein in the right hepatic lobe  5. Incidental note of pulmonary nodule which will need follow up.  6. Tobacco abuse hitory and pulmonary emphysema  7. CKD with current creatinine of 1.35     Recommendations:  1. IV Ceftriaxone + PO  Metronidazole   2. Midline  3. Will need IV antibiotics at discharge for a few weeks before oral transition  4. Going for Ct drain check today  5. Follow culture data  6. Colorectal surgery following      Marlen Barnes MD    Interval History   Fever and leukocytosis have resolved, hungry this morning, no abdominal pain, no new complaints, tolerating antibiotics.    Antimicrobial therapy  9/22-9/24, 9/29 Ceftriaxone / Metronidazole  Prior  9/24-9/29 augmentin  9/22 Zosyn    Physical Exam   Temp: 97.2  F (36.2  C) Temp src: Oral BP: 131/71 Pulse: 68   Resp: 16 SpO2: 95 % O2 Device: None (Room air) Oxygen Delivery: 3 LPM  Vitals:    09/29/21 2227   Weight: 89.4 kg (197 lb)     Vital Signs with Ranges  Temp:  [96.7  F (35.9  C)-98.5  F (36.9  C)] 97.2  F (36.2  C)  Pulse:  [65-72] 68  Resp:  [16-17] 16  BP: (109-141)/(54-80) 131/71  SpO2:  [95 %-97 %] 95 %    Constitutional: Awake, alert, cooperative, no apparent distress  Lungs: Clear to auscultation bilaterally, no crackles or wheezing  Cardiovascular: Regular rate and rhythm, normal S1 and S2, and no murmur noted  Abdomen: Normal bowel sounds, soft, non-distended, non-tender abdominal drain in place  Skin: No rashes, no cyanosis, no edema  Other:    Medications     dextrose 5% and 0.9% NaCl 100 mL/hr at  10/01/21 0005       cefTRIAXone  2 g Intravenous Q24H     metroNIDAZOLE  500 mg Oral BID     pantoprazole (PROTONIX) IV  40 mg Intravenous Daily with breakfast     saccharomyces boulardii  250 mg Oral BID     sodium chloride (PF)  10 mL Irrigation Q8H     sodium chloride (PF)  3 mL Intracatheter Q8H       Data   All microbiology laboratory data reviewed.  Recent Labs   Lab Test 10/01/21  0701 09/29/21  2243 09/24/21  0538   WBC 10.1 11.9* 9.2   HGB 11.8* 13.1* 12.7*   HCT 36.3* 40.6 37.9*   MCV 86 86 83    412 263     Recent Labs   Lab Test 10/01/21  0701 09/29/21  2242 09/24/21  0538   CR 1.19 1.35* 1.16         Micro  9/30 blood cx pending  prior  9/22 abdominal aspirate  Culture 4+ Fusobacterium nucleatumAbnormal               Resulting Agency: [facility]         Susceptibility              Fusobacterium nucleatum       MAYTE       Amoxicillin/Clavulanate 0.016 ug/mL Susceptible       Cefotaxime 0.023 ug/mL Susceptible       Clindamycin 0.38 ug/mL Susceptible       metronidazole <=0.016 ug/mL Susceptible       Penicillin <=0.016 ug/mL Susceptible              Imaging  9/29 Ct abdomen/pelvis  EXAM: CT ABDOMEN AND PELVIS WITH CONTRAST  LOCATION: [facility]  DATE/TIME: 9/29/2021 11:45 PM     INDICATION: Recent perforated diverticulitis. Recurrent fever.  COMPARISON: 09/22/2021 - CT chest, abdomen and pelvis.     TECHNIQUE: CT scan of the abdomen and pelvis was performed following injection of IV contrast. Multiplanar reformats were obtained. Dose reduction techniques were used.  CONTRAST: 99 mL Isovue-370.     FINDINGS:     LOWER CHEST: Unremarkable.     HEPATOBILIARY: A 5.3 x 4.1 cm irregular-shaped mass is present in the posterior aspect of the right lobe of liver (series 3 image 41). The mass has areas of central homogeneous low-attenuation with a relatively thick periphery that is slightly higher in   attenuation, but hypoenhancing relative to the background liver. This mass has mildly  increased in size since 09/22/2021 when it measured 4.8 x 3.9 cm. A linear low-attenuation structure coursing to the inferior aspect of the mass (series 3 image 49) was   present previously and likely represents an associated adjacent thrombosed intrahepatic vein. 1.3 cm low-attenuation focus in the central aspect of the left lobe of the liver (series 3 image 28), possibly slightly increased in size since the comparison   study when it measured 1.1 cm.     SPLEEN: Unremarkable.     PANCREAS: Unremarkable.     ADRENAL GLANDS: Unremarkable.     KIDNEYS/BLADDER: Unremarkable.     BOWEL: Very small hiatal hernia. A few colonic diverticula are present without convincing evidence of acute diverticulitis. The appendix is unremarkable.     LYMPH NODES: Unremarkable.     PELVIC ORGANS: No acute findings.     MUSCULOSKELETAL: No acute findings.     OTHER: A percutaneous pigtail drainage catheter is present entering the midline anterior pelvic wall with distal tip in the anterior aspect of the intraperitoneal space, adjacent to the mid sigmoid colon. No fluid collection is present about the   catheter. Atherosclerotic calcification in the abdominal aorta.                                                                      IMPRESSION:   1. A 5 cm irregular-shaped low-attenuation mass in the right lobe of the liver, slightly increased in size since the recent comparison study dated 09/22/2021. This likely represents a hepatic abscess. There is also a tiny 1.3 cm low-attenuation structure   in the central aspect of the left lobe of liver that is too small to characterize, but may be slightly increased in size since the comparison study. This is nonspecific, but could also represent a tiny developing abscess.  2. No other acute abnormality identified in the abdomen or pelvis.

## 2021-10-01 NOTE — PRE-PROCEDURE
GENERAL PRE-PROCEDURE:   Procedure:  Liver abscess drainage    Written consent obtained?: Yes    Risks and benefits: Risks, benefits and alternatives were discussed    Consent given by:  Patient  Patient states understanding of procedure being performed: Yes    Patient's understanding of procedure matches consent: Yes    Procedure consent matches procedure scheduled: Yes    Expected level of sedation:  Moderate  Appropriately NPO:  Yes  Mallampati  :  Grade 1- soft palate, uvula, tonsillar pillars, and posterior pharyngeal wall visible  Lungs:  Lungs clear with good breath sounds bilaterally  Heart:  Normal heart sounds and rate  History & Physical reviewed:  History and physical reviewed and no updates needed  Statement of review:  I have reviewed the lab findings, diagnostic data, medications, and the plan for sedation

## 2021-10-01 NOTE — PROGRESS NOTES
Therapy: IV ABX  Insurance: Jose Daniel    Ded: $2800  Met: $0  Co-Insurance: 90/10  Max Out of Pocket: $3500  Met: $0    [facility] in reference to admission date 09/29/2021 to check IV ABX coverage.    Please contact Intake with any questions, [phone] or In Basket pool, [facility] ([phone]).

## 2021-10-01 NOTE — PROVIDER NOTIFICATION
MD Notification    Notified Person: after hour hospitalist      Notified Person Name: MD. Brody     Notification Date/Time: 10/1/21 at 1853    Notification Interaction: phone with     Purpose of Notification: Rash all over his back    Orders Received: Benadryl prn ordered    Comments: Pt refuses med at this time

## 2021-10-01 NOTE — PROGRESS NOTES
COLON & RECTAL SURGERY  PROGRESS NOTE    October 1, 2021    SUBJECTIVE: doing well. Had CT sinogram today and no residual fluid collection or abscess. Drain removed in IR. Liver aspiration completed today as well. Patient is hungry and requesting diet. No abdominal pain. No n/v.     OBJECTIVE:  Temp:  [96.7  F (35.9  C)-99.3  F (37.4  C)] 99.3  F (37.4  C)  Pulse:  [65-72] 67  Resp:  [16-17] 16  BP: (109-145)/(54-80) 145/79  SpO2:  [95 %-97 %] 95 %    Intake/Output Summary (Last 24 hours) at 10/1/2021 1717  Last data filed at 10/1/2021 0600  Gross per 24 hour   Intake 635 ml   Output 5 ml   Net 630 ml       GENERAL:  Awake, alert, no acute distress  HEAD: Normocephalic atraumatic  SCLERA: Anicteric  EXTREMITIES: Warm and well perfused  ABDOMEN:  Soft, non-tender, non-distended. No guarding, rigidity, or peritoneal signs.     LABS:  Lab Results   Component Value Date    WBC 10.1 10/01/2021     Lab Results   Component Value Date    HGB 11.8 10/01/2021     Lab Results   Component Value Date    HCT 36.3 10/01/2021     Lab Results   Component Value Date     10/01/2021     Last Basic Metabolic Panel:  Lab Results   Component Value Date     10/01/2021      Lab Results   Component Value Date    POTASSIUM 4.0 10/01/2021     Lab Results   Component Value Date    CHLORIDE 109 10/01/2021     Lab Results   Component Value Date    EBREKET 8.2 10/01/2021     Lab Results   Component Value Date    CO2 20 10/01/2021     Lab Results   Component Value Date    BUN 10 10/01/2021     Lab Results   Component Value Date    CR 1.19 10/01/2021     Lab Results   Component Value Date     10/01/2021       ASSESSMENT/PLAN: 60 yo M with diverticulitis with pelvic and liver abscess  Hospitalized from 9/21-9/25. Readmitted for fevers and chills found to have more developed liver abscess. Pelvic abscess initial drain placement 9/22, CT sinogram today was normal, drain removed. Liver aspiration today completed. Will need long term  antibiotics, defer to ID for abx plan.     1. Low fiber diet  2. PRN pain meds  3. Will need prolonged IV abx, defer to ID  4. OOB, ambulate  5. Okay for discharge from CRS standpoint once stable and abx plan in place from ID.     Seen and examined with Dr. Catherine.     For questions/paging, please contact the CRS office at [phone].    Ghada Langston (Hermes), PA-C  Colorectal Physician Assistant    [facility]  [address] [place] [address]  T: [phone]  F: [phone]

## 2021-10-01 NOTE — PROGRESS NOTES
Report taken by receiving RN    50 mcg fentanyl and 1 mg versed given for 10 minutes sedation time

## 2021-10-01 NOTE — PLAN OF CARE
Pt A&O x 4, VSS on RA other than mildly hypertensive. Pt denies pain or nausea, endorses abdominal bloating and fullness. Clear liquid diet until midnight, then NPO for drain placement this afternoon by IR. Has prior right HAYDEE drain with little output, serosanguineous. Blood cultures pending. IVF infusing @ 100mlhr. Plan is for midline to be placed for long term abx treatment. CT did not occur overnight so should be completed this am. Up independently in the room for voiding. Contact precautions maintained. No reported loose stools overnight. Discharge plan pending.        English

## 2021-10-01 NOTE — PROGRESS NOTES
Diverticular abscess drain placed last week. Outputs minimal. CT sinogram done today which demonstrated a small fluid cavity almost completely resolved, no communication to the bowel so request to remove the drain was done.     After explaining the procedure and answering questions the drain was removed intact with minimal pain and no complications.     Gauze and tape dressing placed and should be changed daily until healed.     Thanks [facility] Interventional Radiology CNP ([phone]) (phone [phone])

## 2021-10-01 NOTE — OP NOTE
S/p ultrasound guided liver abscess aspiration, without complication. Patient left the department in stable condition.

## 2021-10-01 NOTE — PROGRESS NOTES
MD Notification    Notified Person: MD    Notified Person Name: Dr. Childress    Notification Date/Time: 10/1/21 at 1145    Notification Interaction: pager    Purpose of Notification:Pt would like to eat. He had a ultrasound guided liver abscess aspiration this morning. Wondering if he can it at this time as he feels hungry.     Orders Received: Pt was advanced to Clear liquid diet    Comments:

## 2021-10-01 NOTE — PLAN OF CARE
Pt is A and O X4. VSS on RA, Tmax of 99.0 F this am. Denies pain, nausea, and lightheadedness. Advanced to Clear liq diet today. Expiratory wheezing noted to L lung. HAYDEE drain dressing CDI, small to output. Blood cultures results pending. PIV  infusing D5+NS at 100 ml/hr.   Will need midline for long term IV Abx treatment.  ID following.  Plan for CT tonight, or tomorrow am, and drain placement  tomorrow afternoon, per IR. Discharge pending.

## 2021-10-01 NOTE — PROGRESS NOTES
[facility]    Hospitalist Progress Note    Assessment & Plan   Boom Daniel is a 61 year old male admitted on 9/29/2021. He presents to the emergency department with recurrent fevers and shaking chills after recent hospitalization for complicated diverticulitis with intra-abdominal abscess formation and drain as well as hepatic phlegmon.  On imaging, phlegmon has developed into an approximately 5 cm hepatic abscess.     Hepatic abscess with associated severe sepsis: Tachycardia to 115, fevers of 102.9, shaking chills, leukocytosis to 11,9K, lactic acid of 2.  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  At last admission was discharged with plan for 4 weeks twice daily Augmentin, but when that time, patient had interval improvement and subsequent recurrence of fevers and chills now with enlarging hepatic abscess despite antibiotic treatments.  -His apixaban is on hold since 9/29, seen by interventional radiology this admission, had aspiration of the hepatic abscess on 10/1, patient was on heparin drip meantime, continue heparin drip, transition to apixaban possibly 10/2.  Diet initiated.  -Seen by infectious disease and colorectal surgery, current plan is to continue with Rocephin and metronidazole, white count improved, patient remains afebrile.  Cultures pending.  -Blood cultures negative so far, monitor.       Hypertension: Currently normotensive, borderline low blood pressures with fevers  -Prior to admission losartan to be held given risk of intermittent hypotension with bacteremia.  Can restart losartan.     Complicated sigmoid diverticulitis with abscess: Hospitalized 9/21-9/25 with CT-guided drain placement to intra-abdominal abscess 9/22  -Patient seen by colorectal surgery, repeat CT here shows improvement in the abscess, the drain is draining much less now, colorectal surgery to recommend timing of pulling the drain.  -Outpatient colonoscopy is  "being arranged by colorectal surgery as of last admission       Right hepatic vein thrombosis: Followed by vascular medicine at last admission with plan for apixaban anticoagulation and follow-up venogram of abdomen and D-dimer in 3 months to determine ability to discontinue anticoagulation. Thought to have \"not high risk\" of thrombus extension into intrahepatic vein at last admission per vascular medicine.  -Prior to admission apixaban on hold for IR procedure.  Took his  evening dose.    -Following discussion with interventional radiology patient was transitioned to heparin drip, plan is to start back apixaban 10/2 if no active bleeding noted after procedure today.  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  Tells me that his father  of a heart attack in his 40s.  Elevated triglycerides and total cholesterol with low HDL on  lipid panel through outside system  -Discussed importance of establishing primary care with patient in the emergency department  -Following recovery from hepatic abscess, patient should have lipid panel checked, likely initiation on statin therapy  -Currently anticoagulated with apixaban, transition to heparin due to liver abscess aspiration, can transition back to apixaban possibly 10/2.     Acute kidney injury, stage II chronic kidney disease: Patient with a creatinine of 1.35 on admission, baseline creatinine is 1.16.   -Creatinine improved to baseline with IV hydration here.    GERD: Takes Pepcid at home.  For the past 3 weeks has had an intermittent cough.   No infiltrate noted in the lung bases on CT abdomen or on CT chest at last admission.  -IV Protonix daily with Tums available as needed  Code Status: Full Code   DVT prophylaxis: On a heparin drip, PTA on apixaban, transition to apixaban possibly tomorrow a.m.  Disposition: Expected discharge in 1 to 2 " days depending on the culture results and antibiotic choices.    Diana Childress MD  Text Page   (7am to 6pm)    Interval History   With patient after he returned back from liver abscess aspiration, no drain was placed instead abscess was aspirated with ultrasound help.  Denies any pain, he feels quite hungry, afebrile overnight.    -Data reviewed today: I reviewed all new labs and imaging results over the last 24 hours,  Physical Exam   Temp: 97.2  F (36.2  C) Temp src: Oral BP: 131/71 Pulse: 68   Resp: 16 SpO2: 95 % O2 Device: None (Room air) Oxygen Delivery: 3 LPM  Vitals:    09/29/21 2227   Weight: 89.4 kg (197 lb)     Vital Signs with Ranges  Temp:  [96.7  F (35.9  C)-98.5  F (36.9  C)] 97.2  F (36.2  C)  Pulse:  [65-72] 68  Resp:  [16-17] 16  BP: (109-141)/(54-80) 131/71  SpO2:  [95 %-97 %] 95 %  I/O last 3 completed shifts:  In: 635 [P.O.:120; I.V.:515]  Out: 5 [Drains:5]    Constitutional: Awake, alert, cooperative, no apparent distress  Respiratory: Clear to auscultation bilaterally, no crackles or wheezing  Cardiovascular: Regular rate and rhythm, normal S1 and S2, and no murmur noted  GI: Normal bowel sounds, soft, tender right upper quadrant, drain noted  Skin/Integumen: No rashes, no cyanosis, no edema  Neuro : moving all 4 extremities, no focal deficit noted     Medications     dextrose 5% and 0.9% NaCl 100 mL/hr at 10/01/21 0005       cefTRIAXone  2 g Intravenous Q24H     metroNIDAZOLE  500 mg Oral BID     pantoprazole (PROTONIX) IV  40 mg Intravenous Daily with breakfast     saccharomyces boulardii  250 mg Oral BID     sodium chloride (PF)  10 mL Irrigation Q8H     sodium chloride (PF)  3 mL Intracatheter Q8H       Data   Recent Labs   Lab 10/01/21  0701 09/29/21  2243 09/29/21  2242   WBC 10.1 11.9*  --    HGB 11.8* 13.1*  --    MCV 86 86  --     412  --      --  133   POTASSIUM 4.0  --  3.7   CHLORIDE 109  --  100   CO2 20  --  25   BUN 10  --  14   CR 1.19  --  1.35*   ANIONGAP 8  --   8   BEREKET 8.2*  --  8.6   *  --  98   ALBUMIN 2.4*  --  2.9*   PROTTOTAL 7.0  --  7.9   BILITOTAL 0.4  --  0.6   ALKPHOS 110  --  138   ALT 31  --  45   AST 14  --  20     Recent Labs   Lab 10/01/21  0701 09/29/21  2242   * 98       Imaging:   Recent Results (from the past 24 hour(s))   US Drainage Seroma/Hematoma Abscess/Cyst    Narrative    EXAM:  1. PERCUTANEOUS ASPIRATION OF LIVER ABSCESS  2. ULTRASOUND GUIDANCE  3. CONSCIOUS SEDATION  DATE/TIME: 10/1/2021 10:22 AM    INDICATION: Liver abscess, fever    PROCEDURE: Informed consent obtained. Site marked. Prior images  reviewed. Required items made available. Patient identity confirmed  verbally and with arm band. Patient reevaluated immediately before  administering sedation. Universal protocol was followed. Time out  performed. The site was prepped and draped in sterile fashion. 10 mL  of 1% lidocaine was infused into the local soft tissues. Using  standard technique and under real-time ultrasound guidance, a 5 Wolof  catheter was advanced into the posterior right hepatic lobe abscess.  Purulent fluid was aspirated and the catheter.    SPECIMEN: 6 mL of purulent fluid was aspirated and sent to lab for  cultures and Gram stain.    BLOOD LOSS: Minimal.    The patient tolerated the procedure well. No immediate complications.    RADIOLOGIC SUPERVISION AND INTERPRETATION:   ULTRASOUND GUIDANCE: Images demonstrate the needle in good position.  The amount of fluid in the abscess is quite small, measuring 1.5 x 0.8  cm. Remainder of the abnormality on CT is edematous surrounding liver.    SEDATION: Versed 1 mg. Fentanyl 50 mcg. The procedure was performed  with administration intravenous conscious sedation with appropriate  preoperative, intraoperative, and postoperative evaluation.    5 minutes of supervised face to face conscious sedation time was  provided by a radiology nurse under my direct supervision.      Impression    IMPRESSION:  1.  Successful  ultrasound-guided aspiration of a small right hepatic  lobe abscess, yielding 6 mL of pus. The abscess was too small to hold  a drain.    DILAN GRAHAM MD         SYSTEM ID:  [id number]

## 2021-10-01 NOTE — CONSULTS
Care Management Initial Consult    General Information  Assessment completed with: Patient,    Type of CM/SW Visit: CM Role Introduction    Primary Care Provider verified and updated as needed: Yes   Readmission within the last 30 days: other (see comments) (excacerbation of diag)   Return Category: Exacerbation of disease  Reason for Consult: discharge planning           Communication Assessment  Patient's communication style: spoken language (English or Bilingual)    Hearing Difficulty or Deaf: no   Wear Glasses or Blind: no    Cognitive  Cognitive/Neuro/Behavioral: WDL                      Living Environment:   People in home: spouse  Olga  Current living Arrangements: house      Able to return to prior arrangements: yes       Family/Social Support:  Care provided by: self  Provides care for:    Marital Status:   Wife  Olga       Description of Support System: Supportive, Involved    Support Assessment: Adequate family and caregiver support, Adequate social supports    Current Resources:   Patient receiving home care services: No     Community Resources:    Equipment currently used at home: none  Supplies currently used at home: Other (had drain )    Employment/Financial:  Employment Status:          Financial Concerns: other (see comments) (patient with new insurance effective 10/01/01 has coins)   Referral to Financial Counselor: No       Lifestyle & Psychosocial Needs:  Social Determinants of Health     Tobacco Use:      Smoking Tobacco Use:      Smokeless Tobacco Use:    Alcohol Use:      Frequency of Alcohol Consumption:      Average Number of Drinks:      Frequency of Binge Drinking:    Financial Resource Strain:      Difficulty of Paying Living Expenses:    Food Insecurity:      Worried About Running Out of Food in the Last Year:      Ran Out of Food in the Last Year:    Transportation Needs:      Lack of Transportation (Medical):      Lack of Transportation (Non-Medical):    Physical  Activity:      Days of Exercise per Week:      Minutes of Exercise per Session:    Stress:      Feeling of Stress :    Social Connections:      Frequency of Communication with Friends and Family:      Frequency of Social Gatherings with Friends and Family:      Attends [employer] Services:      Active Member of Clubs or Organizations:      Attends Club or Organization Meetings:      Marital Status:    Intimate Partner Violence:      Fear of Current or Ex-Partner:      Emotionally Abused:      Physically Abused:      Sexually Abused:    Depression:      PHQ-2 Score:    Housing Stability:      Unable to Pay for Housing in the Last Year:      Number of Places Lived in the Last Year:      Unstable Housing in the Last Year:        Functional Status:  Prior to admission patient needed assistance:indedpendent           Additional Information:  Writer met with the patient at the bedside regarding discharge planning.  [facility] Liaison was also available during this introduction.  Writer explained role and scope of safe discharge planning.  Patient is currently awaiting plan for discharge and is being followed by IR/ID and Surgery. Plan for possible line placement with extended IV antibiotics.  Patient has benefits for home infusion and was agreeable to speak with [facility] Liaison. Per Liaison there were questions about insurance coverage as the patient is currently under a new insurance as of 10/01/21 and has deductible/copay and out of pocket max's that would apply.  Patient currently has a drain in place (which he had prior to this admission) and per bedside minimal output and may be removed prior to discharge home.  Patient has a follow up with Vascular on 10/05 this will be kept as scheduled unless notified otherwise.  Patient is aware that we will continue to monitor for additional needs as identified.  [facility] will continue to follow.    Avril Negron RN

## 2021-10-02 PROCEDURE — 250N000011 HC RX IP 250 OP 636: Performed by: INTERNAL MEDICINE

## 2021-10-02 PROCEDURE — 250N000013 HC RX MED GY IP 250 OP 250 PS 637: Performed by: INTERNAL MEDICINE

## 2021-10-02 PROCEDURE — C9113 INJ PANTOPRAZOLE SODIUM, VIA: HCPCS | Performed by: INTERNAL MEDICINE

## 2021-10-02 PROCEDURE — 250N000011 HC RX IP 250 OP 636: Performed by: SPECIALIST

## 2021-10-02 PROCEDURE — 99232 SBSQ HOSP IP/OBS MODERATE 35: CPT | Performed by: INTERNAL MEDICINE

## 2021-10-02 PROCEDURE — 250N000013 HC RX MED GY IP 250 OP 250 PS 637: Performed by: SPECIALIST

## 2021-10-02 PROCEDURE — 120N000001 HC R&B MED SURG/OB

## 2021-10-02 PROCEDURE — 250N000013 HC RX MED GY IP 250 OP 250 PS 637: Performed by: HOSPITALIST

## 2021-10-02 PROCEDURE — 258N000003 HC RX IP 258 OP 636: Performed by: INTERNAL MEDICINE

## 2021-10-02 PROCEDURE — 99207 PR MOONLIGHTING INDICATOR: CPT | Performed by: INTERNAL MEDICINE

## 2021-10-02 RX ORDER — METRONIDAZOLE 500 MG/1
500 TABLET ORAL 2 TIMES DAILY
Qty: 42 TABLET | Refills: 0 | Status: SHIPPED | OUTPATIENT
Start: 2021-10-02 | End: 2021-10-03

## 2021-10-02 RX ORDER — ERTAPENEM 1 G/1
1 INJECTION, POWDER, LYOPHILIZED, FOR SOLUTION INTRAMUSCULAR; INTRAVENOUS EVERY 24 HOURS
Status: DISCONTINUED | OUTPATIENT
Start: 2021-10-02 | End: 2021-10-03 | Stop reason: HOSPADM

## 2021-10-02 RX ORDER — ERTAPENEM 1 G/1
1 INJECTION, POWDER, LYOPHILIZED, FOR SOLUTION INTRAMUSCULAR; INTRAVENOUS EVERY 24 HOURS
Qty: 270 ML | Refills: 0 | DISCHARGE
Start: 2021-10-02 | End: 2021-10-29

## 2021-10-02 RX ORDER — PANTOPRAZOLE SODIUM 40 MG/1
40 TABLET, DELAYED RELEASE ORAL
Status: DISCONTINUED | OUTPATIENT
Start: 2021-10-03 | End: 2021-10-03 | Stop reason: HOSPADM

## 2021-10-02 RX ORDER — CEFTRIAXONE 2 G/1
2 INJECTION, POWDER, FOR SOLUTION INTRAMUSCULAR; INTRAVENOUS EVERY 24 HOURS
Qty: 360 ML | Refills: 0 | DISCHARGE
Start: 2021-10-03 | End: 2021-10-02

## 2021-10-02 RX ADMIN — PANTOPRAZOLE SODIUM 40 MG: 40 INJECTION, POWDER, FOR SOLUTION INTRAVENOUS at 09:06

## 2021-10-02 RX ADMIN — DEXTROSE AND SODIUM CHLORIDE: 5; 900 INJECTION, SOLUTION INTRAVENOUS at 07:42

## 2021-10-02 RX ADMIN — DIPHENHYDRAMINE HYDROCHLORIDE 25 MG: 25 CAPSULE ORAL at 20:31

## 2021-10-02 RX ADMIN — DEXTROSE AND SODIUM CHLORIDE: 5; 900 INJECTION, SOLUTION INTRAVENOUS at 18:58

## 2021-10-02 RX ADMIN — APIXABAN 5 MG: 5 TABLET, FILM COATED ORAL at 20:28

## 2021-10-02 RX ADMIN — METRONIDAZOLE 500 MG: 500 TABLET ORAL at 09:06

## 2021-10-02 RX ADMIN — Medication 250 MG: at 20:28

## 2021-10-02 RX ADMIN — Medication 250 MG: at 09:06

## 2021-10-02 RX ADMIN — ERTAPENEM SODIUM 1 G: 1 INJECTION, POWDER, LYOPHILIZED, FOR SOLUTION INTRAMUSCULAR; INTRAVENOUS at 16:06

## 2021-10-02 RX ADMIN — LOSARTAN POTASSIUM 50 MG: 50 TABLET, FILM COATED ORAL at 09:06

## 2021-10-02 RX ADMIN — CEFTRIAXONE SODIUM 2 G: 2 INJECTION, POWDER, FOR SOLUTION INTRAMUSCULAR; INTRAVENOUS at 00:45

## 2021-10-02 RX ADMIN — DIPHENHYDRAMINE HYDROCHLORIDE 25 MG: 25 CAPSULE ORAL at 00:51

## 2021-10-02 ASSESSMENT — ACTIVITIES OF DAILY LIVING (ADL)
ADLS_ACUITY_SCORE: 3

## 2021-10-02 NOTE — PROGRESS NOTES
"X cover 1913    Called by nursing for \"rash all over his back\"    - seen and examined patient  - has maculopapular rash on his back; no mucosal or oral lesions; no rashes on the front or extremities  - vitals stable; denies any shortness of breath  - no new medications; has been on Rocephin/flagyl, started on losartan today (PTA med); no known allergies  - will monitor clinically  - benadryl prn for itching  "

## 2021-10-02 NOTE — PROGRESS NOTES
Writer informed [facility] that patient will be discharging home today and will plan to get a teach this evening prior to his Midnight dose of Rocephin (cefTRIAXone)-LAST TAKEN: 2 g on October 2, 2021 12:45 AM. Midline has been placed. Dr Shields placing Home Infusion orders and RN Home Care order.    1500-Writer notified [facility] that patient will not be discharging today due to a rash. Care Coordinator will continue to follow for discharge planning.

## 2021-10-02 NOTE — PROGRESS NOTES
"Colon & Rectal Surgery Progress Note             Interval History:   Diverticulitis with liver abscess. abd drain removed and liver aspirated. cuntures NGTD  Tolerating diet. + bm, no pain.                 Medications:   I have reviewed this patient's current medications               Physical Exam:   Blood pressure 137/82, pulse 61, temperature (!) 96.2  F (35.7  C), temperature source Oral, resp. rate 15, height 1.803 m (5' 11\"), weight 89.4 kg (197 lb), SpO2 98 %.    Intake/Output Summary (Last 24 hours) at 10/2/2021 0914  Last data filed at 10/1/2021 1800  Gross per 24 hour   Intake 236 ml   Output --   Net 236 ml     GEN:  lert  ABD:  Soft, non-tender         Data:        Lab Results   Component Value Date     10/01/2021    Lab Results   Component Value Date    CHLORIDE 109 10/01/2021    Lab Results   Component Value Date    BUN 10 10/01/2021      Lab Results   Component Value Date    POTASSIUM 4.0 10/01/2021    Lab Results   Component Value Date    CO2 20 10/01/2021    Lab Results   Component Value Date    CR 1.19 10/01/2021    CR 1.35 09/29/2021        Lab Results   Component Value Date    HGB 11.8 (L) 10/01/2021    HGB 13.1 (L) 09/29/2021     Lab Results   Component Value Date     10/01/2021     09/29/2021     Lab Results   Component Value Date    WBC 10.1 10/01/2021    WBC 11.9 (H) 09/29/2021            Assessment and Plan:   Low fiber diet.  abx and discharge per ID and hospitalist. No plan for surgery.     Angeles Gallagher MD  [facility]  Office Phone # [phone]      "

## 2021-10-02 NOTE — PROVIDER NOTIFICATION
MD Notification    Notified Person: MD    Notified Person Name:  Dr. Shields    Notification Date/Time: 10/2/21 at 1350    Notification Interaction: face to face    Purpose of Notification:  Pt's rash to back is expanding.     Orders Received: Dr. Shields ordered writer to notify Infectious Disease about the rash.     Comments:

## 2021-10-02 NOTE — PLAN OF CARE
Patient A&0x4. VSS on RA. Up ad rosendo. Ambulating in his room. Iso for MRSA.  Tolerating low fiber diet. Dressing on incision is CDI. Dressing on left flank is CDI. Rash noted on back - MD aware. Benadryl given for itching. Lungs clear. No edema. Denies pain.  Midline IV placed . D5 NS running at 100ml/hr. Discharge home pending.

## 2021-10-02 NOTE — PLAN OF CARE
Pt is A and O X 4. VSS on RA. Denies pain, nausea, and lightheadedness. Pt had ultrasound guided needle aspiration of liver abscess yesterday, site to RUQ ( R lateral) covered with band-aid, all CDI. Low fiber diet tolerating diet well.  Lungs clear. Bowels active, passing gas. Mid lower abdomen dressing CDI.  Midline in placed,  CDI, infusing D5+NS at 100 ml/hr.  Up at rosendo. Rash to pt's back expanding, pt reports some mild itching- had benadryl overnight. ID notified, and Abx treatment plan was changed.  Will need midline for long term IV Abx treatment. Plan to discharge with midline to home for IV  Abx treatment with [facility]. Per ID, keep patient overnight for assessment of new antibiotic tolerance .

## 2021-10-02 NOTE — PROVIDER NOTIFICATION
MD Notification    Notified Person: MD- Infectious Disease    Notified Person Name:   Dr. Cameron Gee    Notification Date/Time: 10/2/21 at 1358    Notification Interaction: phone with     Purpose of Notification: Pt's rash to back expending, to arms and abdomen.     Orders Received: Abx treatment changed per ID    Comments:

## 2021-10-02 NOTE — PROGRESS NOTES
[facility]    Infectious Disease Progress Note    Date of Service : 10/02/2021     Assessment:  1. Perforated sigmoid diverticulitis with pelvic abscess between sigmoid colon and bladder. S/p IR drain placement , pelvic fluid cx with Fusobacterium nucleatum. CT sinogram appeared stable, drain removed   2. Right hepatic lobe abscess s/p aspiration. Cxs negative thus far  3. Fever/ leukocytosis resolved  4. Partially thrombosed intrahepatic vein in the right hepatic lobe  5. Incidental note of pulmonary nodule which will need follow up.  6. Tobacco abuse hitory and pulmonary emphysema  7. CKD with current creatinine of 1.35     Recommendations:  1. IV Ceftriaxone + PO  Metronidazole -please give prescription for oral metronidazole for 3 weeks  2. Midline in place  3. Will need IV antibiotics at discharge for a several weeks before oral transition  4. Follow culture data  OPIV antibiotic orders in Epic, ID FUP in 2-3  weeks with repeat CT abdomen/pelvis w contrast to reassess liver abscess    OK to discharge from the ID stand point once antibiotics are arranged    Marlen Barnes MD    Interval History   Feels well, remained afebrile, tolerating antibiotics without side effects, no abdominal pain/diarrhea    Antimicrobial therapy  9/22-9/24, 9/29 Ceftriaxone / Metronidazole  Prior  9/24-9/29 augmentin  9/22 Zosyn    Physical Exam   Temp: (!) 96.2  F (35.7  C) Temp src: Oral BP: 137/82 Pulse: 61   Resp: 15 SpO2: 98 % O2 Device: None (Room air)    Vitals:    09/29/21 2227   Weight: 89.4 kg (197 lb)     Vital Signs with Ranges  Temp:  [96  F (35.6  C)-99.3  F (37.4  C)] 96.2  F (35.7  C)  Pulse:  [61-75] 61  Resp:  [15-16] 15  BP: (102-145)/(55-82) 137/82  SpO2:  [95 %-98 %] 98 %    Constitutional: Awake, alert, cooperative, no apparent distress  Lungs: Clear to auscultation bilaterally, no crackles or wheezing  Cardiovascular: Regular rate and rhythm, normal S1 and S2, and no murmur noted  Abdomen:  Normal bowel sounds, soft, non-distended, non-tender   Skin: No rashes, no cyanosis, no edema    Other:    Medications     dextrose 5% and 0.9% NaCl 100 mL/hr at 10/02/21 0742       cefTRIAXone  2 g Intravenous Q24H     losartan  50 mg Oral Daily     metroNIDAZOLE  500 mg Oral BID     pantoprazole (PROTONIX) IV  40 mg Intravenous Daily with breakfast     saccharomyces boulardii  250 mg Oral BID     sodium chloride (PF)  10 mL Irrigation Q8H     sodium chloride (PF)  10 mL Intracatheter Q8H     sodium chloride (PF)  3 mL Intracatheter Q8H     Data   All microbiology laboratory data reviewed.  Recent Labs   Lab Test 10/01/21  0701 09/29/21  2243 09/24/21  0538   WBC 10.1 11.9* 9.2   HGB 11.8* 13.1* 12.7*   HCT 36.3* 40.6 37.9*   MCV 86 86 83    412 263     Recent Labs   Lab Test 10/01/21  0701 09/29/21  2242 09/24/21  0538   CR 1.19 1.35* 1.16     Micro  10/1 liver aspirate cx pending   9/30 blood cx no growth to date,   prior  9/22 abdominal aspirate  Culture 4+ Fusobacterium nucleatumAbnormal               Resulting Agency: [facility]         Susceptibility                   Fusobacterium nucleatum       MAYTE       Amoxicillin/Clavulanate 0.016 ug/mL Susceptible       Cefotaxime 0.023 ug/mL Susceptible       Clindamycin 0.38 ug/mL Susceptible       metronidazole <=0.016 ug/mL Susceptible       Penicillin <=0.016 ug/mL Susceptible              Imaging  9/29 Ct abdomen/pelvis  EXAM: CT ABDOMEN AND PELVIS WITH CONTRAST  LOCATION: [facility]  DATE/TIME: 9/29/2021 11:45 PM     INDICATION: Recent perforated diverticulitis. Recurrent fever.  COMPARISON: 09/22/2021 - CT chest, abdomen and pelvis.     TECHNIQUE: CT scan of the abdomen and pelvis was performed following injection of IV contrast. Multiplanar reformats were obtained. Dose reduction techniques were used.  CONTRAST: 99 mL Isovue-370.     FINDINGS:     LOWER CHEST: Unremarkable.     HEPATOBILIARY: A 5.3 x 4.1 cm irregular-shaped mass is  present in the posterior aspect of the right lobe of liver (series 3 image 41). The mass has areas of central homogeneous low-attenuation with a relatively thick periphery that is slightly higher in   attenuation, but hypoenhancing relative to the background liver. This mass has mildly increased in size since 09/22/2021 when it measured 4.8 x 3.9 cm. A linear low-attenuation structure coursing to the inferior aspect of the mass (series 3 image 49) was   present previously and likely represents an associated adjacent thrombosed intrahepatic vein. 1.3 cm low-attenuation focus in the central aspect of the left lobe of the liver (series 3 image 28), possibly slightly increased in size since the comparison   study when it measured 1.1 cm.     SPLEEN: Unremarkable.     PANCREAS: Unremarkable.     ADRENAL GLANDS: Unremarkable.     KIDNEYS/BLADDER: Unremarkable.     BOWEL: Very small hiatal hernia. A few colonic diverticula are present without convincing evidence of acute diverticulitis. The appendix is unremarkable.     LYMPH NODES: Unremarkable.     PELVIC ORGANS: No acute findings.     MUSCULOSKELETAL: No acute findings.     OTHER: A percutaneous pigtail drainage catheter is present entering the midline anterior pelvic wall with distal tip in the anterior aspect of the intraperitoneal space, adjacent to the mid sigmoid colon. No fluid collection is present about the   catheter. Atherosclerotic calcification in the abdominal aorta.                                                                      IMPRESSION:   1. A 5 cm irregular-shaped low-attenuation mass in the right lobe of the liver, slightly increased in size since the recent comparison study dated 09/22/2021. This likely represents a hepatic abscess. There is also a tiny 1.3 cm low-attenuation structure   in the central aspect of the left lobe of liver that is too small to characterize, but may be slightly increased in size since the comparison study. This is  nonspecific, but could also represent a tiny developing abscess.  2. No other acute abnormality identified in the abdomen or pelvis.

## 2021-10-02 NOTE — PROGRESS NOTES
[facility]  Hospitalist Progress Note for 10/2/2021       Assessment and Plan:   Boom Daniel is a 61 year old male admitted on 9/29/2021. He presents to the emergency department with recurrent fevers and shaking chills after recent hospitalization for complicated diverticulitis with intra-abdominal abscess formation and drain as well as hepatic phlegmon.  On imaging, phlegmon has developed into an approximately 5 cm hepatic abscess.     Hepatic abscess with associated severe sepsis: Tachycardia to 115, fevers of 102.9, shaking chills, leukocytosis to 11,9K, lactic acid of 2.  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  At last admission was discharged with plan for 4 weeks twice daily Augmentin, but when that time, patient had interval improvement and subsequent recurrence of fevers and chills now with enlarging hepatic abscess despite antibiotic treatments.  -His apixaban is on hold since 9/29, seen by interventional radiology this admission, had aspiration of the hepatic abscess on 10/1, patient was on heparin drip meantime, continue heparin drip, transition to apixaban possibly 10/2.  Diet initiated.  -Seen by infectious disease and colorectal surgery, current plan was to continue with Rocephin and metronidazole>  Pt was to discharge home today to continue Rocephin & Flagyl but pt developed a rash last night c/w drug rash that is increasing.  Drug Rash:  Most ikely 2/2 antibiotics. During last hospitalization 9/21 to 9/25/2021 he received IV pip-tazo,then changed to ceftriaxone and metronidazole & discharged on Augmentin 875/125 mg BID x4 weeks   - This admission he was continued on  ceftriaxone and metronidazole  - he also received contrast yesterday and also last week  - rash 1st noted on back 10/1(maculopapula,r, pruritic) & spreading to upper ext/thighs  - Discussed with ID   Plan- both Rocephin & Flagyl were stopped & D & started on Invanz  - discharge  "home postponed, monitor pt overnight & if stable plan to discharge home in AM to continue IV antibiotics per ID's recommendations  - white count improved, patient remains afebrile. - Cultures NTD & pending.     Hypertension: Currently normotensive, borderline low blood pressures with fevers  - tolerating PTA losartan.     Complicated sigmoid diverticulitis with abscess: Hospitalized - with CT-guided drain placement to intra-abdominal abscess   -Patient seen by colorectal surgery, repeat CT here shows improvement in the abscess, the drain is draining much less now, colorectal surgery  recommend pulling the drain.  -s/p drain was removed intact by IR on 10/1  -Outpatient colonoscopy is being arranged by colorectal surgery as of last admission     Right hepatic vein thrombosis: Followed by vascular medicine at last admission with plan for apixaban anticoagulation and follow-up venogram of abdomen and D-dimer in 3 months to determine ability to discontinue anticoagulation. Thought to have \"not high risk\" of thrombus extension into intrahepatic vein at last admission per vascular medicine.  -Prior to admission apixaban on hold for IR procedure.  Took his  evening dose.    - Following discussion with interventional radiology patient was supposed to transition to heparin drip- but no orders were placed  - pt restarted on PTA apixaban 5 mg bid today    Hyperlipidemia:  Family history of early coronary artery disease, history of tobacco use in early sustained remission: Quit smoking approximately 1/2 years ago.  Tells me that his father  of a heart attack in his 40s.  Elevated triglycerides and total cholesterol with low HDL on  lipid panel through outside system  -Discussed importance of establishing primary care with patient in the emergency department  -Following recovery from hepatic abscess, patient should have lipid panel checked, likely initiation on statin therapy  -Currently anticoagulated " with apixaban, transition to heparin due to liver abscess aspiration, can transition back to apixaban possibly 10/2.     Acute kidney injury, stage II chronic kidney disease: Patient with a creatinine of 1.35 on admission, baseline creatinine is 1.16.   -Creatinine improved to baseline with IV hydration here.     GERD: Takes Pepcid at home.  For the past 3 weeks has had an intermittent cough.  No infiltrate noted in the lung bases on CT abdomen or on CT chest at last admission.  - ? GERD causing cough. Started on IV Protonix. Cont PPI trial - change IV to po Protonix daily with Tums available as needed    Abnormal CT chest   CT chest PE on 9/22/2021 showed-Indeterminate 4 mm nodule right middle lobe on series 6 image 150. Recommend follow-up CT in one year for reevaluation  - pt ex smoker quit smoking > 1 yr back.  - recommended f/u with PCP    Code Status: Full Code   DVT prophylaxis: On a heparin drip, PTA on apixaban, transition to apixaban possibly tomorrow a.m.  Disposition:  discharge today postponed 2/2 drug rash.Alvino discharge home in AM on new IV antibiotics per ID.  Discussed with ID, pt, his wife , RN, SW.    Kacie Shields MD.  Hospitalist [phone] (7am -6 pm)               Interval History:   Last night's events noted reg rash on back. benadryl helped him sleep.   Plan was to discharge pt home today with home infusion for IV Rocephin.              Medications:       apixaban ANTICOAGULANT  5 mg Oral BID     ertapenem (INVanz) IV  1 g Intravenous Q24H     losartan  50 mg Oral Daily     pantoprazole (PROTONIX) IV  40 mg Intravenous Daily with breakfast     saccharomyces boulardii  250 mg Oral BID     sodium chloride (PF)  10 mL Irrigation Q8H     sodium chloride (PF)  10 mL Intracatheter Q8H     sodium chloride (PF)  3 mL Intracatheter Q8H     acetaminophen **OR** acetaminophen, calcium carbonate, diphenhydrAMINE **OR** diphenhydrAMINE, HYDROmorphone, lidocaine 4%, lidocaine (buffered or not  "buffered), lidocaine (buffered or not buffered), melatonin, naloxone **OR** naloxone **OR** naloxone **OR** naloxone, ondansetron **OR** ondansetron, oxyCODONE, prochlorperazine **OR** prochlorperazine **OR** prochlorperazine, senna-docusate **OR** senna-docusate, sodium chloride (PF), sodium chloride (PF)               Physical Exam:   Blood pressure 119/67, pulse 64, temperature (!) 96  F (35.6  C), temperature source Oral, resp. rate 16, height 1.803 m (5' 11\"), weight 89.4 kg (197 lb), SpO2 97 %.  Wt Readings from Last 4 Encounters:   21 89.4 kg (197 lb)   21 91.1 kg (200 lb 12.8 oz)   21 89.4 kg (197 lb)         Vital Sign Ranges  Temperature Temp  Av.9  F (36.1  C)  Min: 96  F (35.6  C)  Max: 99.3  F (37.4  C)   Blood pressure Systolic (24hrs), Av , Min:102 , Max:145        Diastolic (24hrs), Av, Min:55, Max:82      Pulse Pulse  Av  Min: 61  Max: 75   Respirations Resp  Avg: 15.8  Min: 15  Max: 16   Pulse oximetry SpO2  Av.3 %  Min: 95 %  Max: 98 %         Intake/Output Summary (Last 24 hours) at 10/2/2021 1603  Last data filed at 10/1/2021 1800  Gross per 24 hour   Intake 236 ml   Output --   Net 236 ml       Constitutional: Awake, alert, cooperative, no apparent distress   Lungs: Clear to auscultation bilaterally, no crackles or wheezing   Cardiovascular: Regular rate and rhythm, normal S1 and S2, and no murmur noted   Abdomen: Normal bowel sounds, soft, non-distended, non-tender   Skin: Maculopapular erythematous rash noted mostly on entire back,now also on upper ext 1/3 up & R inner thigh               Data:   All laboratory data reviewed    "

## 2021-10-02 NOTE — PROGRESS NOTES
"[facility]    Received referral for IV abx. Patient has coverage for IV antibiotics through his [employer] plan. Deductible $2800 (met $0 to date), 90/10 coverage until max annual out of pocket $3500 (met $0). Once out of pocket is met in full, patient should have coverage at 100%. This plan is new as of today (10/1/21).     I met with Boom at bedside and spoke with his wife via phone (to verify updated insurance information). I introduced home infusion services and reviewed benefits. He would like to proceed with [facility] for home IV abx needs. Patient was pleasant and felt confident that he and his wife would do well with home infusion. He voiced concerns over the deductible and out of pocket cost of new health insurance. He said \"I hate healthcare. Everything is always in the dark. I know you're not the one to ask but I want to know how much this hospitalization is costing me.\" Pt vented his frustrations and was pleasant again at the end of our conversation.     Thank you for the referral.    Erika Villanueva RN  [facility] Liaison  [phone] (Mon thru Fri 8am - 5pm)  [phone] Office    "

## 2021-10-02 NOTE — PROCEDURES
[facility]    Single Lumen Midline Placement    Date/Time: 10/1/2021 8:00 PM  Performed by: Isaias Mcgee MD  Authorized by: Isaias Mcgee MD   Indications: vascular access    UNIVERSAL PROTOCOL   Site Marked: Yes  Prior Images Obtained and Reviewed:  Yes  Required items: Required blood products, implants, devices and special equipment available    Patient identity confirmed:  Verbally with patient and arm band  Patient was reevaluated immediately before administering moderate or deep sedation or anesthesia  Confirmation Checklist:  Patient's identity using two indicators, relevant allergies, procedure was appropriate and matched the consent or emergent situation and correct equipment/implants were available  Time out: Immediately prior to the procedure a time out was called    Universal Protocol: the Joint Commission Universal Protocol was followed    Preparation: Patient was prepped and draped in usual sterile fashion           ANESTHESIA    Anesthesia: See MAR for details  Local Anesthetic:  Lidocaine 1% without epinephrine  Anesthetic Total (mL):  2      SEDATION    Patient Sedated: No        Preparation: skin prepped with 2% chlorhexidine  Skin prep agent: skin prep agent completely dried prior to procedure  Sterile barriers: maximum sterile barriers were used: cap, mask, sterile gown, sterile gloves, and large sterile sheet  Hand hygiene: hand hygiene performed prior to central venous catheter insertion  Type of line used: Midline  Catheter type: single lumen  Lumen type: non-valved  Brand: Bard  Placement method: venipuncture  Number of attempts: 1  Successful placement: yes  Orientation: right  Location: basilic vein  Visible catheter length: 0  Internal length: 8 cm  Total catheter length: 8  Dressing and securement: sterile dressing applied, securement device and chlorhexidine disc applied  Post procedure assessment: blood return through all ports  PROCEDURE   Patient  Tolerance:  Patient tolerated the procedure well with no immediate complications

## 2021-10-03 ENCOUNTER — HOME INFUSION (PRE-WILLOW HOME INFUSION) (OUTPATIENT)
Dept: PHARMACY | Facility: CLINIC | Age: 61
End: 2021-10-03

## 2021-10-03 VITALS
TEMPERATURE: 97.8 F | DIASTOLIC BLOOD PRESSURE: 73 MMHG | SYSTOLIC BLOOD PRESSURE: 135 MMHG | HEIGHT: 71 IN | WEIGHT: 197 LBS | RESPIRATION RATE: 16 BRPM | BODY MASS INDEX: 27.58 KG/M2 | HEART RATE: 71 BPM | OXYGEN SATURATION: 96 %

## 2021-10-03 PROCEDURE — 999N000190 HC STATISTIC VAT ROUNDS

## 2021-10-03 PROCEDURE — 250N000013 HC RX MED GY IP 250 OP 250 PS 637: Performed by: INTERNAL MEDICINE

## 2021-10-03 PROCEDURE — 258N000003 HC RX IP 258 OP 636: Performed by: INTERNAL MEDICINE

## 2021-10-03 PROCEDURE — 99239 HOSP IP/OBS DSCHRG MGMT >30: CPT | Performed by: INTERNAL MEDICINE

## 2021-10-03 PROCEDURE — 99207 PR MOONLIGHTING INDICATOR: CPT | Performed by: INTERNAL MEDICINE

## 2021-10-03 RX ADMIN — Medication 250 MG: at 07:59

## 2021-10-03 RX ADMIN — LOSARTAN POTASSIUM 50 MG: 50 TABLET, FILM COATED ORAL at 07:59

## 2021-10-03 RX ADMIN — APIXABAN 5 MG: 5 TABLET, FILM COATED ORAL at 07:59

## 2021-10-03 RX ADMIN — DEXTROSE AND SODIUM CHLORIDE: 5; 900 INJECTION, SOLUTION INTRAVENOUS at 03:03

## 2021-10-03 RX ADMIN — PANTOPRAZOLE SODIUM 40 MG: 40 TABLET, DELAYED RELEASE ORAL at 07:58

## 2021-10-03 ASSESSMENT — ACTIVITIES OF DAILY LIVING (ADL)
ADLS_ACUITY_SCORE: 3

## 2021-10-03 NOTE — PLAN OF CARE
AOx4. VSS on RA. Up ind in room. Tolerating low fiber diet. Denies pain and N/V. Midline infusing D5NS @ 100ml/hr with int abx. Started on IV ertapenem. Rash to pt's back, improving per pt report. PRN benadryl given for mild itching. RUQ paracentesis site bandaid, CDI. Lower abd dressing, CDI. ID following. Plan on discharge home w/home infusion tomorrow pending new abx tolerance.

## 2021-10-03 NOTE — PROGRESS NOTES
Writer notified [facility] that there is a change in patient's antibiotic for discharge. After consulting with Dr Shields, it was decided to wait until later this morning (approximately 2 hours) before decision is made to medically clear patient for discharge today. Bedside Nurse Foster also informed of plan and will update Dr Shields. Care Coordinator to follow along.      ertapenem 1 GM vial  Also known as: INVanz  INSTRUCTIONS: Inject 1 g into the vein every 24 hours for 27 days  Doctor's comments: Check cbc/diff, creatinine, AST, CRP weekly, results to [facility] consultants- Dr. Barnes     Schedule FUP in 2-3 weeks  Reason for med: Abscess, Infection Within the Abdomen  LAST TAKEN: 1 g on October 2, 2021  4:06 PM

## 2021-10-03 NOTE — PLAN OF CARE
Pt is a/ox4. VS stable. No pain at this time. Up independently. Discharge instructions & paperwork reviewed with patient. He will be continuing with abx through the midline at home with [facility]. All questions and concerns were addressed.

## 2021-10-03 NOTE — DISCHARGE SUMMARY
[facility]    Discharge Summary  Hospitalist    Date of Admission:  9/29/2021  Date of Discharge:  10/3/2021  Discharging Provider: Kacie Shields MD    Discharge Diagnoses   1. Hepatic abscess with associated severe sepsis  2. Recent Complicated sigmoid diverticulitis with abscess, s/p drain was removed intact by IR on 10/1  3.Drug rash  4. Right hepatic vein thrombosis  5. Hypertension  6. Acute kidney injury, stage II chronic kidney disease  7. Abnormal CT chest, Indeterminate 4 mm nodule right middle lobe    Other Diagnosis  1..Hyperlipidemia:  2. Family history of early coronary artery disease  3. history of tobacco use in early sustained remission  4. GERD    Chief Complaint: Fever  History of Present Illness   Boom Daniel is a 61 year old male with history of recent sepsis due to perforated diverticulitis with neena-diverticular abdominal abscesses and hepatic phlegmon s/p HAYDEE drain placement on 9/22, CKD, hyperlipidemia, MRSA, and GERD on apixaban who presents with recurrent fever and chills. The patient has an abscess in his abdomen due to diverticulitis and a drain is in place for this. He was recently released from the hospital on 09/25/2021 with antibiotics and has been feeling fine until today. He reports he has been taking the Augmentin as prescribed. Today he began to feel fatigued and at 2030 he started to spike a fever. He also began to have chills and felt short of breath. Upon arrival he also reports some abdominal distention and states he has had a cough for the past 2 weeks. He denies any vomiting, or blood in the stool. The patient has been having loose stools every day consistently. No significant abdominal pain. He denies chest pain. He denies any other symptoms. He has had minimal output from his HAYDEE drain.       Hospital Course   Boom Daniel was admitted on 9/29/2021.  The following problems were addressed during his hospitalization:  Boom Daniel is a 61  year old male admitted on 9/29/2021. He presents to the emergency department with recurrent fevers and shaking chills after recent hospitalization for complicated diverticulitis with intra-abdominal abscess formation and drain as well as hepatic phlegmon.  On imaging, phlegmon has developed into an approximately 5 cm hepatic abscess.     Hepatic abscess with associated severe sepsis: Tachycardia to 115, fevers of 102.9, shaking chills, leukocytosis to 11,9K, lactic acid of 2.  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  At last admission was discharged with plan for 4 weeks twice daily Augmentin, but when that time, patient had interval improvement and subsequent recurrence of fevers and chills now with enlarging hepatic abscess despite antibiotic treatments.  -His apixaban is on hold since 9/29, seen by interventional radiology this admission, had aspiration of the hepatic abscess on 10/1, patient was on heparin drip meantime, continue heparin drip, transition to apixaban possibly 10/2.  Diet initiated.  -Seen by infectious disease and colorectal surgery, current plan was to continue with Rocephin and metronidazole>  Pt was to discharge home today to continue Rocephin & Flagyl but pt developed a rash last night c/w drug rash that is increasing.  Drug Rash:  Most likely 2/2 antibiotics. During last hospitalization 9/21 to 9/25/2021 he received IV pip-tazo,then changed to ceftriaxone and metronidazole & discharged on Augmentin 875/125 mg BID x4 weeks   - This admission he was initially continued on  ceftriaxone and metronidazole  - he also received contrast this admission and also last week  - rash 1st noted on back 10/1(maculopapula,r, pruritic) & spreading to upper ext/thighs  - Discussed with ID   Plan- both Rocephin & Flagyl were stopped &  & started on Invanz on 10/2 per ID  - discharge home postponed from 10/2 to monitor pt overnight   - on 10/3- rash improved  - discharge  "home to continue Invanz Q 24 hrs X 24 days per ID & f/u with Dr Barnes in 2-3 weeks with repeat CT abd/pelvis w contrast.  - white count improved, patient remains afebrile. - Cultures NTD & pending.     Hypertension: Currently normotensive, borderline low blood pressures with fevers  - tolerating PTA losartan.     Complicated sigmoid diverticulitis with abscess: Hospitalized - with CT-guided drain placement to intra-abdominal abscess   -Patient seen by colorectal surgery, repeat CT here shows improvement in the abscess, the drain is draining much less now, colorectal surgery  recommend pulling the drain.  -s/p drain was removed intact by IR on 10/1  -Outpatient colonoscopy is being arranged by colorectal surgery as of last admission     Right hepatic vein thrombosis: Followed by vascular medicine at last admission with plan for apixaban anticoagulation and follow-up venogram of abdomen and D-dimer in 3 months to determine ability to discontinue anticoagulation. Thought to have \"not high risk\" of thrombus extension into intrahepatic vein at last admission per vascular medicine.  -Prior to admission apixaban on hold for IR procedure.  Took his  evening dose.    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  Tells me that his father  of a heart attack in his 40s.  Elevated triglycerides and total cholesterol with low HDL on  lipid panel through outside system  -Discussed importance of establishing primary care with patient in the emergency department  -Following recovery from hepatic abscess, patient should have lipid panel checked, likely initiation on statin therapy  -Currently anticoagulated with " apixaban, transition to heparin due to liver abscess aspiration, can transition back to apixaban possibly 10/2.     Acute kidney injury, stage II chronic kidney disease: Patient with a creatinine of 1.35 on admission, baseline creatinine is 1.16.   -Creatinine improved to baseline with IV hydration here.     GERD: Takes Pepcid at home.  For the past 3 weeks has had an intermittent cough.  No infiltrate noted in the lung bases on CT abdomen or on CT chest at last admission.  - ? GERD causing cough. Started on IV Protonix. Cont PPI trial - change IV to po Protonix daily with Tums available as needed     Abnormal CT chest   CT chest PE on 9/22/2021 showed-Indeterminate 4 mm nodule right middle lobe on series 6 image 150. Recommend follow-up CT in one year for reevaluation  - pt ex smoker quit smoking > 1 yr back.  - recommended f/u with PCP     Kacie Shields MD    Significant Results and Procedures   s/p drain was removed intact by IR on 10/1    Pending Results   These results will be followed up by PCP & ID  Unresulted Labs Ordered in the Past 30 Days of this Admission     Date and Time Order Name Status Description    10/1/2021 10:17 AM Aspirate Aerobic Bacterial Culture Routine Preliminary     10/1/2021 10:17 AM Anaerobic Bacterial Culture Routine Preliminary     9/30/2021  1:27 AM Blood Culture Peripheral Blood Preliminary     9/30/2021  1:27 AM Blood Culture Peripheral Blood Preliminary           Code Status   Full Code       Primary Care Physician   [facility]    Physical Exam   Temp: 97.8  F (36.6  C) Temp src: Oral BP: 135/82 Pulse: 62   Resp: 16 SpO2: 97 % O2 Device: None (Room air)    Vitals:    09/29/21 2227   Weight: 89.4 kg (197 lb)     Vital Signs with Ranges  Temp:  [96  F (35.6  C)-97.8  F (36.6  C)] 97.8  F (36.6  C)  Pulse:  [61-77] 62  Resp:  [14-16] 16  BP: (119-137)/(67-82) 135/82  SpO2:  [97 %] 97 %  No intake/output data recorded.    Examination:  Well-built  well-nourished. In NAD  Heart: Regular rhythm. S1S2, no murmurs, rubs,gallops  Lungs: Clear to auscultation. And no rhonchi rales or wheezing heard.  Abdomen: Soft and nontender. Bowel sounds present.  Extremities: No swelling.  Neuro:A,A,Ox3, motor sensory grossly intact  Skin: improving rash on back    Discharge Disposition   Discharged to home  Condition at discharge: Stable    Consultations This Hospital Stay   SURGERY GENERAL IP CONSULT  INFECTIOUS DISEASES IP CONSULT  COLORECTAL SURGERY IP CONSULT  CARE MANAGEMENT / SOCIAL WORK IP CONSULT  VASCULAR ACCESS ADULT IP CONSULT  CARE MANAGEMENT / SOCIAL WORK IP CONSULT    Time Spent on this Encounter   IKacie MD, , personally saw the patient today and spent greater than 30 minutes discharging this patient.    Discharge Orders      Home care nursing referral      Home infusion referral      Reason for your hospital stay    Perforated sigmoid diverticulitis with pelvic abscess between sigmoid colon and bladder. S/p IR drain placement     Follow-up and recommended labs and tests     Follow up with primary care provider, [facility], within 7 days to evaluate treatment change, for hospital follow- up, regarding new diagnosis, and to follow up on Incidental note of pulmonary nodule which will need follow up.    Follow up with ID Dr.Sadia Barnes, in 2-3  weeks with repeat CT abdomen/pelvis w contrast to reassess liver abscess     Activity    Your activity upon discharge: activity as tolerated     MD face to face encounter    Documentation of Face to Face and Certification for Home Health Services    I certify that patient: Boom Daniel is under my care and that I, or a nurse practitioner or physician's assistant working with me, had a face-to-face encounter that meets the physician face-to-face encounter requirements with this patient on: 10/2/2021.    This encounter with the patient was in whole, or in part, for the following medical  condition, which is the primary reason for home health care: IV antibiotics    I certify that, based on my findings, the following services are medically necessary home health services: home infusion.    My clinical findings support the need for the above services because: home infusion for IV antibiotics    Further, I certify that my clinical findings support that this patient is homebound (i.e. absences from home require considerable and taxing effort and are for medical reasons or [employer] services or infrequently or of short duration when for other reasons) because: need for IV antibiotics.    Based on the above findings. I certify that this patient is confined to the home and needs intermittent skilled nursing care, physical therapy and/or speech therapy.  The patient is under my care, and I have initiated the establishment of the plan of care.  This patient will be followed by a physician who will periodically review the plan of care.  Physician/Provider to provide follow up care: Medicine, [facility] physician (the Medicare certified [facility] provider): Kacie Shields MD, MD  Physician Signature: See electronic signature associated with these discharge orders.  Date: 10/2/2021     Diet    Follow this diet upon discharge:       Low Fiber Diet     Discharge Medications   Current Discharge Medication List      START taking these medications    Details   ertapenem (INVANZ) 1 GM vial Inject 1 g into the vein every 24 hours for 27 days  Qty: 270 mL, Refills: 0    Comments: Check cbc/diff, creatinine, AST, CRP weekly, [facility]    Schedule FUP in 2-3 weeks  Associated Diagnoses: Liver abscess; Diverticulitis of large intestine with abscess, unspecified bleeding status         CONTINUE these medications which have NOT CHANGED    Details   apixaban ANTICOAGULANT (ELIQUIS) 5 MG tablet Take 1 tablet (5 mg) by mouth 2 times daily  Qty: 180 tablet,  Refills: 1    Associated Diagnoses: Hepatic vein thrombosis (H)      losartan (COZAAR) 50 MG tablet Take 50 mg by mouth daily      saccharomyces boulardii (FLORASTOR) 250 MG capsule Take 1 capsule (250 mg) by mouth 2 times daily for 28 days  Qty: 56 capsule, Refills: 0    Associated Diagnoses: Diverticulitis of large intestine with abscess, unspecified bleeding status         STOP taking these medications       amoxicillin-clavulanate (AUGMENTIN) 875-125 MG tablet Comments:   Reason for Stopping:             Allergies   No Known Allergies  Data   Most Recent 3 CBC's:Recent Labs   Lab Test 10/01/21  0701 09/29/21  2243 09/24/21  0538   WBC 10.1 11.9* 9.2   HGB 11.8* 13.1* 12.7*   MCV 86 86 83    412 263      Most Recent 3 BMP's:  Recent Labs   Lab Test 10/01/21  0701 09/29/21  2242 09/24/21  0538    133 137   POTASSIUM 4.0 3.7 3.7   CHLORIDE 109 100 105   CO2 20 25 23   BUN 10 14 12   CR 1.19 1.35* 1.16   ANIONGAP 8 8 9   BEREKET 8.2* 8.6 8.6   * 98 90     Most Recent 2 LFT's:  Recent Labs   Lab Test 10/01/21  0701 09/29/21  2242   AST 14 20   ALT 31 45   ALKPHOS 110 138   BILITOTAL 0.4 0.6     Most Recent INR's and Anticoagulation Dosing History:  Anticoagulation Dose History     Recent Dosing and Labs Latest Ref Rng & Units 9/22/2021    INR 0.85 - 1.15 1.23(H)        Most Recent 3 Troponin's:  Recent Labs   Lab Test 09/21/21 2143 09/20/21  1239   TROPONIN <0.015 <0.015     Most Recent Cholesterol Panel:No lab results found.  Most Recent 6 Bacteria Isolates From Any Culture (See EPIC Reports for Culture Details):No lab results found.  Most Recent TSH, T4 and A1c Labs:No lab results found.  Results for orders placed or performed during the hospital encounter of 09/29/21   CT Abdomen Pelvis w Contrast    Narrative    EXAM: CT ABDOMEN AND PELVIS WITH CONTRAST  LOCATION: [facility]  DATE/TIME: 9/29/2021 11:45 PM    INDICATION: Recent perforated diverticulitis. Recurrent  fever.  COMPARISON: 09/22/2021 - CT chest, abdomen and pelvis.    TECHNIQUE: CT scan of the abdomen and pelvis was performed following injection of IV contrast. Multiplanar reformats were obtained. Dose reduction techniques were used.  CONTRAST: 99 mL Isovue-370.    FINDINGS:    LOWER CHEST: Unremarkable.    HEPATOBILIARY: A 5.3 x 4.1 cm irregular-shaped mass is present in the posterior aspect of the right lobe of liver (series 3 image 41). The mass has areas of central homogeneous low-attenuation with a relatively thick periphery that is slightly higher in   attenuation, but hypoenhancing relative to the background liver. This mass has mildly increased in size since 09/22/2021 when it measured 4.8 x 3.9 cm. A linear low-attenuation structure coursing to the inferior aspect of the mass (series 3 image 49) was   present previously and likely represents an associated adjacent thrombosed intrahepatic vein. 1.3 cm low-attenuation focus in the central aspect of the left lobe of the liver (series 3 image 28), possibly slightly increased in size since the comparison   study when it measured 1.1 cm.    SPLEEN: Unremarkable.    PANCREAS: Unremarkable.    ADRENAL GLANDS: Unremarkable.    KIDNEYS/BLADDER: Unremarkable.    BOWEL: Very small hiatal hernia. A few colonic diverticula are present without convincing evidence of acute diverticulitis. The appendix is unremarkable.    LYMPH NODES: Unremarkable.    PELVIC ORGANS: No acute findings.    MUSCULOSKELETAL: No acute findings.    OTHER: A percutaneous pigtail drainage catheter is present entering the midline anterior pelvic wall with distal tip in the anterior aspect of the intraperitoneal space, adjacent to the mid sigmoid colon. No fluid collection is present about the   catheter. Atherosclerotic calcification in the abdominal aorta.      Impression    IMPRESSION:   1. A 5 cm irregular-shaped low-attenuation mass in the right lobe of the liver, slightly increased in size  since the recent comparison study dated 09/22/2021. This likely represents a hepatic abscess. There is also a tiny 1.3 cm low-attenuation structure   in the central aspect of the left lobe of liver that is too small to characterize, but may be slightly increased in size since the comparison study. This is nonspecific, but could also represent a tiny developing abscess.  2. No other acute abnormality identified in the abdomen or pelvis.   CT Sinogram Injection    Narrative    CT SINOGRAM INJECTION 10/1/2021 3:41 PM    CLINICAL HISTORY: Rule out fistula, history of diverticular abscess  status post drain placement 9/22.    TECHNIQUE: CT scan of the pelvis was performed without IV contrast. CT  before and after injection of 15 mL diluted contrast through the  percutaneous drain. Multiplanar reformats were obtained. Dose  reduction techniques were used.  CONTRAST: None.    COMPARISON: 9/29/2021    FINDINGS: Percutaneous drainage catheter anterior to the bladder and  sigmoid colon. No significant remaining fluid. After injection of  contrast, a small potential cavity around the drainage catheter fills  as well as some backflow along the catheter into the abdominal wall.  There is no fistula to the adjacent colon or bladder.      Impression    IMPRESSION:   1.  No fistula. If outputs from the drain are minimal, this can likely  be removed.    DILAN GRAHAM MD         SYSTEM ID:  [id number]   US Drainage Seroma/Hematoma Abscess/Cyst    Narrative    EXAM:  1. PERCUTANEOUS ASPIRATION OF LIVER ABSCESS  2. ULTRASOUND GUIDANCE  3. CONSCIOUS SEDATION  DATE/TIME: 10/1/2021 10:22 AM    INDICATION: Liver abscess, fever    PROCEDURE: Informed consent obtained. Site marked. Prior images  reviewed. Required items made available. Patient identity confirmed  verbally and with arm band. Patient reevaluated immediately before  administering sedation. Universal protocol was followed. Time out  performed. The site was prepped and draped in  sterile fashion. 10 mL  of 1% lidocaine was infused into the local soft tissues. Using  standard technique and under real-time ultrasound guidance, a 5 Tamazight  catheter was advanced into the posterior right hepatic lobe abscess.  Purulent fluid was aspirated and the catheter.    SPECIMEN: 6 mL of purulent fluid was aspirated and sent to lab for  cultures and Gram stain.    BLOOD LOSS: Minimal.    The patient tolerated the procedure well. No immediate complications.    RADIOLOGIC SUPERVISION AND INTERPRETATION:   ULTRASOUND GUIDANCE: Images demonstrate the needle in good position.  The amount of fluid in the abscess is quite small, measuring 1.5 x 0.8  cm. Remainder of the abnormality on CT is edematous surrounding liver.    SEDATION: Versed 1 mg. Fentanyl 50 mcg. The procedure was performed  with administration intravenous conscious sedation with appropriate  preoperative, intraoperative, and postoperative evaluation.    5 minutes of supervised face to face conscious sedation time was  provided by a radiology nurse under my direct supervision.      Impression    IMPRESSION:  1.  Successful ultrasound-guided aspiration of a small right hepatic  lobe abscess, yielding 6 mL of pus. The abscess was too small to hold  a drain.    DILAN GRAHAM MD         SYSTEM ID:  [id number]

## 2021-10-03 NOTE — PLAN OF CARE
recently hospitalized for an abscess in his colon and had drain placed  Primary Diagnosis: Liver abscess  Neuro- A&O  Most Recent Vitals- Temp: (!) 96  F (35.6  C) Temp src: Oral BP: 124/74 Pulse: 77   Resp: 14 SpO2: 97 % O2 Device: None (Room air)   Tele/Cardiac- NA  Resp- RA  Activity- indep  Pain- denies  Drips- PIV  Drains/Tubes- BR  Skin- dressing cove the drainage site  GI/- WNL  Aggression Color- Geen  COVID status- a symptomatic  Plan- TBD  Misc- Pt alert oriented tolerated diet denies pain independently in the room with  PIV infused. Pt has good output plan possibly go home today    Suzanna Head RN

## 2021-10-03 NOTE — PROGRESS NOTES
Dr. Shields calls stating having technical issues with Epic and unable to sign discharge order. Per Dr. Shields, verbal order given to discharge patient. Other orders for discharge completed.

## 2021-10-03 NOTE — CONSULTS
Care Management Discharge Note    Discharge Date: 10/03/2021       Discharge Disposition: Home    Discharge Services: [facility]    Discharge DME: None    Discharge Transportation: family     Private pay costs discussed: Not applicable    PAS Confirmation Code: N/A  Patient/family educated on Medicare website which has current facility and service quality ratings: no    Education Provided on the Discharge Plan:Yes    Persons Notified of Discharge Plans: Yes  Patient/Family in Agreement with the Plan: yes     Handoff Referral Completed: No    Additional Information:  Patient discharging today to home. New Antibiotic order in Epic. Confirmed discharge with Brandon at [facility]. Plan for a Teach later this afternoon at patient's home and next IV Antibiotic to be given at home at 1600.  No further discharge needs.                           Naila Khan RN  Care Coordinator  [phone]

## 2021-10-05 ENCOUNTER — HOME INFUSION (PRE-WILLOW HOME INFUSION) (OUTPATIENT)
Dept: PHARMACY | Facility: CLINIC | Age: 61
End: 2021-10-05

## 2021-10-05 LAB
BACTERIA BLD CULT: NO GROWTH
BACTERIA BLD CULT: NO GROWTH

## 2021-10-06 ENCOUNTER — LAB REQUISITION (OUTPATIENT)
Dept: LAB | Facility: CLINIC | Age: 61
End: 2021-10-06
Payer: COMMERCIAL

## 2021-10-06 ENCOUNTER — HOME INFUSION (PRE-WILLOW HOME INFUSION) (OUTPATIENT)
Dept: PHARMACY | Facility: CLINIC | Age: 61
End: 2021-10-06

## 2021-10-06 DIAGNOSIS — A41.9 SEPSIS, UNSPECIFIED ORGANISM (H): ICD-10-CM

## 2021-10-06 LAB
AST SERPL W P-5'-P-CCNC: 13 U/L (ref 0–45)
BACTERIA ASPIRATE CULT: NO GROWTH
BASOPHILS # BLD AUTO: 0.1 10E3/UL (ref 0–0.2)
BASOPHILS NFR BLD AUTO: 1 %
BUN SERPL-MCNC: 15 MG/DL (ref 7–30)
CREAT SERPL-MCNC: 1.19 MG/DL (ref 0.66–1.25)
CRP SERPL-MCNC: 13.4 MG/L (ref 0–8)
EOSINOPHIL # BLD AUTO: 0.1 10E3/UL (ref 0–0.7)
EOSINOPHIL NFR BLD AUTO: 1 %
ERYTHROCYTE [DISTWIDTH] IN BLOOD BY AUTOMATED COUNT: 13.2 % (ref 10–15)
GFR SERPL CREATININE-BSD FRML MDRD: 66 ML/MIN/1.73M2
HCT VFR BLD AUTO: 41.5 % (ref 40–53)
HGB BLD-MCNC: 13.7 G/DL (ref 13.3–17.7)
IMM GRANULOCYTES # BLD: 0 10E3/UL
IMM GRANULOCYTES NFR BLD: 0 %
LYMPHOCYTES # BLD AUTO: 1.6 10E3/UL (ref 0.8–5.3)
LYMPHOCYTES NFR BLD AUTO: 25 %
MCH RBC QN AUTO: 28.4 PG (ref 26.5–33)
MCHC RBC AUTO-ENTMCNC: 33 G/DL (ref 31.5–36.5)
MCV RBC AUTO: 86 FL (ref 78–100)
MONOCYTES # BLD AUTO: 0.4 10E3/UL (ref 0–1.3)
MONOCYTES NFR BLD AUTO: 7 %
NEUTROPHILS # BLD AUTO: 4.1 10E3/UL (ref 1.6–8.3)
NEUTROPHILS NFR BLD AUTO: 66 %
NRBC # BLD AUTO: 0 10E3/UL
NRBC BLD AUTO-RTO: 0 /100
PLATELET # BLD AUTO: 444 10E3/UL (ref 150–450)
RBC # BLD AUTO: 4.83 10E6/UL (ref 4.4–5.9)
WBC # BLD AUTO: 6.3 10E3/UL (ref 4–11)

## 2021-10-06 PROCEDURE — 82565 ASSAY OF CREATININE: CPT | Performed by: SPECIALIST

## 2021-10-06 PROCEDURE — 86140 C-REACTIVE PROTEIN: CPT | Performed by: SPECIALIST

## 2021-10-06 PROCEDURE — 85025 COMPLETE CBC W/AUTO DIFF WBC: CPT | Performed by: SPECIALIST

## 2021-10-06 PROCEDURE — 84520 ASSAY OF UREA NITROGEN: CPT | Performed by: SPECIALIST

## 2021-10-06 PROCEDURE — 84450 TRANSFERASE (AST) (SGOT): CPT | Performed by: SPECIALIST

## 2021-10-07 ENCOUNTER — HOME INFUSION (PRE-WILLOW HOME INFUSION) (OUTPATIENT)
Dept: PHARMACY | Facility: CLINIC | Age: 61
End: 2021-10-07

## 2021-10-08 LAB — BACTERIA ASPIRATE CULT: NORMAL

## 2021-10-12 NOTE — PROGRESS NOTES
This is a recent snapshot of the patient's [facility] medical record.  For current drug dose and complete information and questions, call [phone] or In Basket pool, fv home infusion ([id number])  CSN Number:  [id number]

## 2021-10-13 ENCOUNTER — MEDICAL CORRESPONDENCE (OUTPATIENT)
Dept: HEALTH INFORMATION MANAGEMENT | Facility: CLINIC | Age: 61
End: 2021-10-13

## 2021-10-13 ENCOUNTER — LAB REQUISITION (OUTPATIENT)
Dept: LAB | Facility: CLINIC | Age: 61
End: 2021-10-13
Payer: COMMERCIAL

## 2021-10-13 ENCOUNTER — HOME INFUSION (PRE-WILLOW HOME INFUSION) (OUTPATIENT)
Dept: PHARMACY | Facility: CLINIC | Age: 61
End: 2021-10-13
Payer: COMMERCIAL

## 2021-10-13 DIAGNOSIS — A41.9 SEPSIS, UNSPECIFIED ORGANISM (H): ICD-10-CM

## 2021-10-13 LAB
AST SERPL W P-5'-P-CCNC: 30 U/L (ref 0–45)
BASOPHILS # BLD AUTO: 0.1 10E3/UL (ref 0–0.2)
BASOPHILS NFR BLD AUTO: 1 %
BUN SERPL-MCNC: 14 MG/DL (ref 7–30)
CREAT SERPL-MCNC: 1.07 MG/DL (ref 0.66–1.25)
CRP SERPL-MCNC: 19.4 MG/L (ref 0–8)
EOSINOPHIL # BLD AUTO: 0.1 10E3/UL (ref 0–0.7)
EOSINOPHIL NFR BLD AUTO: 2 %
ERYTHROCYTE [DISTWIDTH] IN BLOOD BY AUTOMATED COUNT: 13.5 % (ref 10–15)
GFR SERPL CREATININE-BSD FRML MDRD: 75 ML/MIN/1.73M2
HCT VFR BLD AUTO: 41.1 % (ref 40–53)
HGB BLD-MCNC: 13.3 G/DL (ref 13.3–17.7)
IMM GRANULOCYTES # BLD: 0.1 10E3/UL
IMM GRANULOCYTES NFR BLD: 1 %
LYMPHOCYTES # BLD AUTO: 1.5 10E3/UL (ref 0.8–5.3)
LYMPHOCYTES NFR BLD AUTO: 20 %
MCH RBC QN AUTO: 27.8 PG (ref 26.5–33)
MCHC RBC AUTO-ENTMCNC: 32.4 G/DL (ref 31.5–36.5)
MCV RBC AUTO: 86 FL (ref 78–100)
MONOCYTES # BLD AUTO: 0.6 10E3/UL (ref 0–1.3)
MONOCYTES NFR BLD AUTO: 8 %
NEUTROPHILS # BLD AUTO: 5.2 10E3/UL (ref 1.6–8.3)
NEUTROPHILS NFR BLD AUTO: 68 %
NRBC # BLD AUTO: 0 10E3/UL
NRBC BLD AUTO-RTO: 0 /100
PLATELET # BLD AUTO: 308 10E3/UL (ref 150–450)
RBC # BLD AUTO: 4.78 10E6/UL (ref 4.4–5.9)
WBC # BLD AUTO: 7.4 10E3/UL (ref 4–11)

## 2021-10-13 PROCEDURE — 86140 C-REACTIVE PROTEIN: CPT | Performed by: SPECIALIST

## 2021-10-13 PROCEDURE — 85025 COMPLETE CBC W/AUTO DIFF WBC: CPT | Performed by: SPECIALIST

## 2021-10-13 PROCEDURE — 84450 TRANSFERASE (AST) (SGOT): CPT | Performed by: SPECIALIST

## 2021-10-13 PROCEDURE — 82565 ASSAY OF CREATININE: CPT | Performed by: SPECIALIST

## 2021-10-13 PROCEDURE — 84520 ASSAY OF UREA NITROGEN: CPT | Performed by: SPECIALIST

## 2021-10-14 ENCOUNTER — HOME INFUSION (PRE-WILLOW HOME INFUSION) (OUTPATIENT)
Dept: PHARMACY | Facility: CLINIC | Age: 61
End: 2021-10-14

## 2021-10-19 ENCOUNTER — HOSPITAL ENCOUNTER (OUTPATIENT)
Dept: CT IMAGING | Facility: CLINIC | Age: 61
Discharge: HOME | End: 2021-10-19
Attending: SPECIALIST | Admitting: SPECIALIST
Payer: COMMERCIAL

## 2021-10-19 DIAGNOSIS — K75.0 ABSCESS, LIVER: ICD-10-CM

## 2021-10-19 PROCEDURE — 250N000011 HC RX IP 250 OP 636: Performed by: SPECIALIST

## 2021-10-19 PROCEDURE — 74177 CT ABD & PELVIS W/CONTRAST: CPT

## 2021-10-19 PROCEDURE — 250N000009 HC RX 250: Performed by: SPECIALIST

## 2021-10-19 RX ORDER — IOPAMIDOL 755 MG/ML
96 INJECTION, SOLUTION INTRAVASCULAR ONCE
Status: COMPLETED | OUTPATIENT
Start: 2021-10-19 | End: 2021-10-19

## 2021-10-19 RX ADMIN — SODIUM CHLORIDE 67 ML: 9 INJECTION, SOLUTION INTRAVENOUS at 14:46

## 2021-10-19 RX ADMIN — IOPAMIDOL 96 ML: 755 INJECTION, SOLUTION INTRAVENOUS at 14:45

## 2021-10-19 NOTE — PROGRESS NOTES
This is a recent snapshot of the patient's [facility] medical record.  For current drug dose and complete information and questions, call [phone] or In Basket pool, fv home infusion ([id number])  CSN Number:  [id number]

## 2021-10-20 ENCOUNTER — HOME INFUSION (PRE-WILLOW HOME INFUSION) (OUTPATIENT)
Dept: PHARMACY | Facility: CLINIC | Age: 61
End: 2021-10-20

## 2021-10-20 ENCOUNTER — LAB REQUISITION (OUTPATIENT)
Dept: LAB | Facility: CLINIC | Age: 61
End: 2021-10-20
Payer: COMMERCIAL

## 2021-10-20 DIAGNOSIS — A41.9 SEPSIS, UNSPECIFIED ORGANISM (H): ICD-10-CM

## 2021-10-20 LAB
AST SERPL W P-5'-P-CCNC: 36 U/L (ref 0–45)
BASOPHILS # BLD AUTO: 0 10E3/UL (ref 0–0.2)
BASOPHILS NFR BLD AUTO: 1 %
BUN SERPL-MCNC: 19 MG/DL (ref 7–30)
CREAT SERPL-MCNC: 1.11 MG/DL (ref 0.66–1.25)
CRP SERPL-MCNC: 15 MG/L (ref 0–8)
EOSINOPHIL # BLD AUTO: 0.2 10E3/UL (ref 0–0.7)
EOSINOPHIL NFR BLD AUTO: 3 %
ERYTHROCYTE [DISTWIDTH] IN BLOOD BY AUTOMATED COUNT: 13.8 % (ref 10–15)
GFR SERPL CREATININE-BSD FRML MDRD: 71 ML/MIN/1.73M2
HCT VFR BLD AUTO: 39.9 % (ref 40–53)
HGB BLD-MCNC: 13 G/DL (ref 13.3–17.7)
IMM GRANULOCYTES # BLD: 0 10E3/UL
IMM GRANULOCYTES NFR BLD: 1 %
LYMPHOCYTES # BLD AUTO: 0.9 10E3/UL (ref 0.8–5.3)
LYMPHOCYTES NFR BLD AUTO: 17 %
MCH RBC QN AUTO: 28.1 PG (ref 26.5–33)
MCHC RBC AUTO-ENTMCNC: 32.6 G/DL (ref 31.5–36.5)
MCV RBC AUTO: 86 FL (ref 78–100)
MONOCYTES # BLD AUTO: 0.4 10E3/UL (ref 0–1.3)
MONOCYTES NFR BLD AUTO: 9 %
NEUTROPHILS # BLD AUTO: 3.6 10E3/UL (ref 1.6–8.3)
NEUTROPHILS NFR BLD AUTO: 69 %
NRBC # BLD AUTO: 0 10E3/UL
NRBC BLD AUTO-RTO: 0 /100
PLATELET # BLD AUTO: 217 10E3/UL (ref 150–450)
RBC # BLD AUTO: 4.63 10E6/UL (ref 4.4–5.9)
WBC # BLD AUTO: 5.2 10E3/UL (ref 4–11)

## 2021-10-20 PROCEDURE — 84450 TRANSFERASE (AST) (SGOT): CPT | Performed by: SPECIALIST

## 2021-10-20 PROCEDURE — 84520 ASSAY OF UREA NITROGEN: CPT | Performed by: SPECIALIST

## 2021-10-20 PROCEDURE — 86140 C-REACTIVE PROTEIN: CPT | Performed by: SPECIALIST

## 2021-10-20 PROCEDURE — 85025 COMPLETE CBC W/AUTO DIFF WBC: CPT | Performed by: SPECIALIST

## 2021-10-20 PROCEDURE — 82565 ASSAY OF CREATININE: CPT | Performed by: SPECIALIST

## 2021-10-21 ENCOUNTER — HOME INFUSION (PRE-WILLOW HOME INFUSION) (OUTPATIENT)
Dept: PHARMACY | Facility: CLINIC | Age: 61
End: 2021-10-21

## 2021-10-21 NOTE — PROGRESS NOTES
This is a recent snapshot of the patient's [facility] medical record.  For current drug dose and complete information and questions, call [phone] or In Basket pool, fv home infusion ([id number])  CSN Number:  [id number]

## 2021-10-22 NOTE — PROGRESS NOTES
This is a recent snapshot of the patient's [facility] medical record.  For current drug dose and complete information and questions, call [phone] or In Basket pool, fv home infusion ([id number])  CSN Number:  [id number]

## 2021-10-24 ENCOUNTER — HEALTH MAINTENANCE LETTER (OUTPATIENT)
Age: 61
End: 2021-10-24

## 2021-10-25 ENCOUNTER — TRANSFERRED RECORDS (OUTPATIENT)
Dept: HEALTH INFORMATION MANAGEMENT | Facility: CLINIC | Age: 61
End: 2021-10-25
Payer: COMMERCIAL

## 2021-10-27 ENCOUNTER — HOME INFUSION (PRE-WILLOW HOME INFUSION) (OUTPATIENT)
Dept: PHARMACY | Facility: CLINIC | Age: 61
End: 2021-10-27

## 2021-10-27 ENCOUNTER — LAB REQUISITION (OUTPATIENT)
Dept: LAB | Facility: CLINIC | Age: 61
End: 2021-10-27
Payer: COMMERCIAL

## 2021-10-27 ENCOUNTER — MEDICAL CORRESPONDENCE (OUTPATIENT)
Dept: HEALTH INFORMATION MANAGEMENT | Facility: CLINIC | Age: 61
End: 2021-10-27

## 2021-10-27 DIAGNOSIS — A41.9 SEPSIS, UNSPECIFIED ORGANISM (H): ICD-10-CM

## 2021-10-27 LAB
AST SERPL W P-5'-P-CCNC: 32 U/L (ref 0–45)
BASOPHILS # BLD AUTO: 0 10E3/UL (ref 0–0.2)
BASOPHILS NFR BLD AUTO: 1 %
BUN SERPL-MCNC: 16 MG/DL (ref 7–30)
CREAT SERPL-MCNC: 1.06 MG/DL (ref 0.66–1.25)
CRP SERPL-MCNC: 12 MG/L (ref 0–8)
EOSINOPHIL # BLD AUTO: 0.1 10E3/UL (ref 0–0.7)
EOSINOPHIL NFR BLD AUTO: 2 %
ERYTHROCYTE [DISTWIDTH] IN BLOOD BY AUTOMATED COUNT: 14.1 % (ref 10–15)
GFR SERPL CREATININE-BSD FRML MDRD: 75 ML/MIN/1.73M2
HCT VFR BLD AUTO: 41.6 % (ref 40–53)
HGB BLD-MCNC: 13.6 G/DL (ref 13.3–17.7)
IMM GRANULOCYTES # BLD: 0 10E3/UL
IMM GRANULOCYTES NFR BLD: 0 %
LYMPHOCYTES # BLD AUTO: 1.2 10E3/UL (ref 0.8–5.3)
LYMPHOCYTES NFR BLD AUTO: 26 %
MCH RBC QN AUTO: 27.8 PG (ref 26.5–33)
MCHC RBC AUTO-ENTMCNC: 32.7 G/DL (ref 31.5–36.5)
MCV RBC AUTO: 85 FL (ref 78–100)
MONOCYTES # BLD AUTO: 0.5 10E3/UL (ref 0–1.3)
MONOCYTES NFR BLD AUTO: 10 %
NEUTROPHILS # BLD AUTO: 2.8 10E3/UL (ref 1.6–8.3)
NEUTROPHILS NFR BLD AUTO: 61 %
NRBC # BLD AUTO: 0 10E3/UL
NRBC BLD AUTO-RTO: 0 /100
PLATELET # BLD AUTO: 185 10E3/UL (ref 150–450)
RBC # BLD AUTO: 4.9 10E6/UL (ref 4.4–5.9)
WBC # BLD AUTO: 4.7 10E3/UL (ref 4–11)

## 2021-10-27 PROCEDURE — 86140 C-REACTIVE PROTEIN: CPT | Performed by: SPECIALIST

## 2021-10-27 PROCEDURE — 84450 TRANSFERASE (AST) (SGOT): CPT | Performed by: SPECIALIST

## 2021-10-27 PROCEDURE — 84520 ASSAY OF UREA NITROGEN: CPT | Performed by: SPECIALIST

## 2021-10-27 PROCEDURE — 36592 COLLECT BLOOD FROM PICC: CPT | Performed by: SPECIALIST

## 2021-10-27 PROCEDURE — 85025 COMPLETE CBC W/AUTO DIFF WBC: CPT | Performed by: SPECIALIST

## 2021-10-27 PROCEDURE — 82565 ASSAY OF CREATININE: CPT | Performed by: SPECIALIST

## 2021-10-28 ENCOUNTER — MEDICAL CORRESPONDENCE (OUTPATIENT)
Dept: HEALTH INFORMATION MANAGEMENT | Facility: CLINIC | Age: 61
End: 2021-10-28
Payer: COMMERCIAL

## 2021-10-28 DIAGNOSIS — K75.0 LIVER ABSCESS: Primary | ICD-10-CM

## 2021-10-29 ENCOUNTER — HOME INFUSION (PRE-WILLOW HOME INFUSION) (OUTPATIENT)
Dept: PHARMACY | Facility: CLINIC | Age: 61
End: 2021-10-29
Payer: COMMERCIAL

## 2021-10-29 NOTE — PROGRESS NOTES
This is a recent snapshot of the patient's [facility] medical record.  For current drug dose and complete information and questions, call [phone] or In Basket pool, fv home infusion ([id number])  CSN Number:  [id number]

## 2021-11-01 ENCOUNTER — LAB (OUTPATIENT)
Dept: LAB | Facility: CLINIC | Age: 61
End: 2021-11-01
Payer: COMMERCIAL

## 2021-11-01 DIAGNOSIS — K75.0 LIVER ABSCESS: ICD-10-CM

## 2021-11-01 LAB — CRP SERPL-MCNC: 20 MG/L (ref 0–8)

## 2021-11-01 PROCEDURE — 86140 C-REACTIVE PROTEIN: CPT

## 2021-11-01 PROCEDURE — 36415 COLL VENOUS BLD VENIPUNCTURE: CPT

## 2021-11-02 NOTE — PROGRESS NOTES
This is a recent snapshot of the patient's [facility] medical record.  For current drug dose and complete information and questions, call [phone] or In Basket pool, fv home infusion ([id number])  CSN Number:  [id number]

## 2021-11-03 DIAGNOSIS — Z11.59 ENCOUNTER FOR SCREENING FOR OTHER VIRAL DISEASES: ICD-10-CM

## 2021-11-03 NOTE — PROGRESS NOTES
This is a recent snapshot of the patient's [facility] medical record.  For current drug dose and complete information and questions, call [phone] or In Basket pool, fv home infusion ([id number])  CSN Number:  [id number]

## 2021-11-17 NOTE — PROGRESS NOTES
This is a recent snapshot of the patient's [facility] medical record.  For current drug dose and complete information and questions, call [phone] or In [facility] pool, fv home infusion ([id number])  CSN Number:  [id number]

## 2021-11-19 ENCOUNTER — HOSPITAL ENCOUNTER (OUTPATIENT)
Dept: CT IMAGING | Facility: CLINIC | Age: 61
Discharge: HOME | End: 2021-11-19
Attending: SPECIALIST | Admitting: SPECIALIST
Payer: COMMERCIAL

## 2021-11-19 DIAGNOSIS — K75.0 LIVER ABSCESS: ICD-10-CM

## 2021-11-19 PROCEDURE — 250N000011 HC RX IP 250 OP 636: Performed by: SPECIALIST

## 2021-11-19 PROCEDURE — 250N000009 HC RX 250: Performed by: SPECIALIST

## 2021-11-19 PROCEDURE — 74177 CT ABD & PELVIS W/CONTRAST: CPT

## 2021-11-19 RX ORDER — IOPAMIDOL 755 MG/ML
96 INJECTION, SOLUTION INTRAVASCULAR ONCE
Status: COMPLETED | OUTPATIENT
Start: 2021-11-19 | End: 2021-11-19

## 2021-11-19 RX ADMIN — SODIUM CHLORIDE 67 ML: 9 INJECTION, SOLUTION INTRAVENOUS at 14:37

## 2021-11-19 RX ADMIN — IOPAMIDOL 96 ML: 755 INJECTION, SOLUTION INTRAVENOUS at 14:33

## 2021-12-02 ENCOUNTER — LAB (OUTPATIENT)
Dept: URGENT CARE | Facility: URGENT CARE | Age: 61
End: 2021-12-02
Attending: COLON & RECTAL SURGERY
Payer: COMMERCIAL

## 2021-12-02 DIAGNOSIS — Z11.59 ENCOUNTER FOR SCREENING FOR OTHER VIRAL DISEASES: ICD-10-CM

## 2021-12-02 LAB — SARS-COV-2 RNA RESP QL NAA+PROBE: NEGATIVE

## 2021-12-02 PROCEDURE — U0005 INFEC AGEN DETEC AMPLI PROBE: HCPCS

## 2021-12-02 PROCEDURE — U0003 INFECTIOUS AGENT DETECTION BY NUCLEIC ACID (DNA OR RNA); SEVERE ACUTE RESPIRATORY SYNDROME CORONAVIRUS 2 (SARS-COV-2) (CORONAVIRUS DISEASE [COVID-19]), AMPLIFIED PROBE TECHNIQUE, MAKING USE OF HIGH THROUGHPUT TECHNOLOGIES AS DESCRIBED BY CMS-2020-01-R: HCPCS

## 2021-12-02 NOTE — PROGRESS NOTES
COVID-19 PCR test completed. Patient handout For Patients Who Have Been Tested for Covid-19 (Coronavirus) was given to the patient, which includes test result notification process.     .

## 2021-12-06 ENCOUNTER — HOSPITAL ENCOUNTER (OUTPATIENT)
Facility: CLINIC | Age: 61
Discharge: HOME | End: 2021-12-06
Attending: COLON & RECTAL SURGERY | Admitting: COLON & RECTAL SURGERY
Payer: COMMERCIAL

## 2021-12-06 VITALS
SYSTOLIC BLOOD PRESSURE: 151 MMHG | OXYGEN SATURATION: 95 % | RESPIRATION RATE: 20 BRPM | HEART RATE: 61 BPM | DIASTOLIC BLOOD PRESSURE: 87 MMHG

## 2021-12-06 LAB — COLONOSCOPY: NORMAL

## 2021-12-06 PROCEDURE — 88305 TISSUE EXAM BY PATHOLOGIST: CPT | Mod: TC | Performed by: COLON & RECTAL SURGERY

## 2021-12-06 PROCEDURE — 99153 MOD SED SAME PHYS/QHP EA: CPT | Performed by: COLON & RECTAL SURGERY

## 2021-12-06 PROCEDURE — 250N000011 HC RX IP 250 OP 636: Performed by: COLON & RECTAL SURGERY

## 2021-12-06 PROCEDURE — 45380 COLONOSCOPY AND BIOPSY: CPT | Performed by: COLON & RECTAL SURGERY

## 2021-12-06 PROCEDURE — G0500 MOD SEDAT ENDO SERVICE >5YRS: HCPCS | Performed by: COLON & RECTAL SURGERY

## 2021-12-06 PROCEDURE — 272N000105 HC DEVICE CLIP QUICK: Performed by: COLON & RECTAL SURGERY

## 2021-12-06 PROCEDURE — 45385 COLONOSCOPY W/LESION REMOVAL: CPT | Performed by: COLON & RECTAL SURGERY

## 2021-12-06 RX ORDER — FLUMAZENIL 0.1 MG/ML
0.2 INJECTION, SOLUTION INTRAVENOUS
Status: DISCONTINUED | OUTPATIENT
Start: 2021-12-06 | End: 2021-12-06 | Stop reason: HOSPADM

## 2021-12-06 RX ORDER — ONDANSETRON 4 MG/1
4 TABLET, ORALLY DISINTEGRATING ORAL EVERY 6 HOURS PRN
Status: DISCONTINUED | OUTPATIENT
Start: 2021-12-06 | End: 2021-12-06 | Stop reason: HOSPADM

## 2021-12-06 RX ORDER — NALOXONE HYDROCHLORIDE 0.4 MG/ML
0.2 INJECTION, SOLUTION INTRAMUSCULAR; INTRAVENOUS; SUBCUTANEOUS
Status: DISCONTINUED | OUTPATIENT
Start: 2021-12-06 | End: 2021-12-06 | Stop reason: HOSPADM

## 2021-12-06 RX ORDER — ONDANSETRON 2 MG/ML
4 INJECTION INTRAMUSCULAR; INTRAVENOUS
Status: DISCONTINUED | OUTPATIENT
Start: 2021-12-06 | End: 2021-12-06 | Stop reason: HOSPADM

## 2021-12-06 RX ORDER — FENTANYL CITRATE 50 UG/ML
INJECTION, SOLUTION INTRAMUSCULAR; INTRAVENOUS PRN
Status: COMPLETED | OUTPATIENT
Start: 2021-12-06 | End: 2021-12-06

## 2021-12-06 RX ORDER — LIDOCAINE 40 MG/G
CREAM TOPICAL
Status: DISCONTINUED | OUTPATIENT
Start: 2021-12-06 | End: 2021-12-06 | Stop reason: HOSPADM

## 2021-12-06 RX ORDER — NALOXONE HYDROCHLORIDE 0.4 MG/ML
0.4 INJECTION, SOLUTION INTRAMUSCULAR; INTRAVENOUS; SUBCUTANEOUS
Status: DISCONTINUED | OUTPATIENT
Start: 2021-12-06 | End: 2021-12-06 | Stop reason: HOSPADM

## 2021-12-06 RX ORDER — ONDANSETRON 2 MG/ML
4 INJECTION INTRAMUSCULAR; INTRAVENOUS EVERY 6 HOURS PRN
Status: DISCONTINUED | OUTPATIENT
Start: 2021-12-06 | End: 2021-12-06 | Stop reason: HOSPADM

## 2021-12-06 RX ORDER — PROCHLORPERAZINE MALEATE 10 MG
10 TABLET ORAL EVERY 6 HOURS PRN
Status: DISCONTINUED | OUTPATIENT
Start: 2021-12-06 | End: 2021-12-06 | Stop reason: HOSPADM

## 2021-12-06 RX ADMIN — MIDAZOLAM 1 MG: 1 INJECTION INTRAMUSCULAR; INTRAVENOUS at 09:05

## 2021-12-06 RX ADMIN — MIDAZOLAM 2 MG: 1 INJECTION INTRAMUSCULAR; INTRAVENOUS at 08:56

## 2021-12-06 RX ADMIN — FENTANYL CITRATE 100 MCG: 50 INJECTION, SOLUTION INTRAMUSCULAR; INTRAVENOUS at 08:55

## 2021-12-06 NOTE — H&P
Pre-Endoscopy History and Physical     Boom Daniel MRN# [id number]   YOB: 1960 Age: 61 year old     Date of Procedure: 2021  Primary care provider: Medicine, [facility]  Type of Endoscopy: Colonoscopy  Reason for Procedure: H/O diverticulitis  Type of Anesthesia Anticipated: Moderate Sedation    HPI:    Boom is a 61 year old male who will be undergoing the above procedure.      A history and physical has been performed. The patient's medications and allergies have been reviewed. The risks and benefits of the procedure and the sedation options and risks were discussed with the patient.  All questions were answered and informed consent was obtained.      He denies a personal or family history of anesthesia complications or bleeding disorders.     No Known Allergies     No current facility-administered medications on file prior to encounter.  omeprazole (PRILOSEC) 20 MG DR capsule, Take 20 mg by mouth daily  apixaban ANTICOAGULANT (ELIQUIS) 5 MG tablet, Take 1 tablet (5 mg) by mouth 2 times daily  losartan (COZAAR) 50 MG tablet, Take 50 mg by mouth daily        Patient Active Problem List   Diagnosis     Fever, unspecified fever cause     Diverticulitis of large intestine with abscess, unspecified bleeding status     Sepsis (H)     Liver abscess        Past Medical History:   Diagnosis Date     Hypertension         Past Surgical History:   Procedure Laterality Date     ORTHOPEDIC SURGERY      foot surgery       Social History     Tobacco Use     Smoking status: Former Smoker     Types: Cigarettes     Quit date: 3/19/2020     Years since quittin.7     Smokeless tobacco: Never Used   Substance Use Topics     Alcohol use: Yes     Comment: couple beers on weekend       History reviewed. No pertinent family history.    REVIEW OF SYSTEMS:     5 point ROS negative except as noted above in HPI, including Gen., Resp., CV, GI &  system review.      PHYSICAL EXAM:   There were no vitals taken  "for this visit. Estimated body mass index is 27.48 kg/m  as calculated from the following:    Height as of 9/29/21: 1.803 m (5' 11\").    Weight as of 9/29/21: 89.4 kg (197 lb).   GENERAL APPEARANCE: healthy and alert  MENTAL STATUS: alert  AIRWAY EXAM: Mallampatti Class I (visualization of the soft palate, fauces, uvula, anterior and posterior pillars)  RESP: lungs clear to auscultation - no rales, rhonchi or wheezes  CV: regular rates and rhythm      IMPRESSION   ASA Class 2 - Mild systemic disease        PLAN:     Plan for colonoscopy. We discussed the risks, benefits and alternatives and the patient wished to proceed.    The above has been forwarded to the consulting provider.      Claudia Catherine MD  [facility]  Phone: [phone]  Fax: [phone]  December 6, 2021    "

## 2021-12-07 LAB
PATH REPORT.COMMENTS IMP SPEC: NORMAL
PATH REPORT.COMMENTS IMP SPEC: NORMAL
PATH REPORT.FINAL DX SPEC: NORMAL
PATH REPORT.GROSS SPEC: NORMAL
PATH REPORT.MICROSCOPIC SPEC OTHER STN: NORMAL
PHOTO IMAGE: NORMAL

## 2021-12-07 PROCEDURE — 88305 TISSUE EXAM BY PATHOLOGIST: CPT | Mod: 26 | Performed by: PATHOLOGY

## 2022-01-26 NOTE — PROGRESS NOTES
This is a recent snapshot of the patient's [facility] medical record.  For current drug dose and complete information and questions, call [phone] or In Basket pool, [facility] ([id number])  CSN Number:  [id number]

## 2022-02-02 DIAGNOSIS — Z11.59 ENCOUNTER FOR SCREENING FOR OTHER VIRAL DISEASES: Primary | ICD-10-CM

## 2022-02-24 ENCOUNTER — LAB (OUTPATIENT)
Dept: URGENT CARE | Facility: URGENT CARE | Age: 62
End: 2022-02-24
Attending: FAMILY MEDICINE
Payer: COMMERCIAL

## 2022-02-24 DIAGNOSIS — Z20.822 ENCOUNTER FOR LABORATORY TESTING FOR COVID-19 VIRUS: ICD-10-CM

## 2022-02-24 DIAGNOSIS — Z11.59 ENCOUNTER FOR SCREENING FOR OTHER VIRAL DISEASES: ICD-10-CM

## 2022-02-24 PROCEDURE — 99207 PR NO CHARGE LOS: CPT

## 2022-02-24 PROCEDURE — U0003 INFECTIOUS AGENT DETECTION BY NUCLEIC ACID (DNA OR RNA); SEVERE ACUTE RESPIRATORY SYNDROME CORONAVIRUS 2 (SARS-COV-2) (CORONAVIRUS DISEASE [COVID-19]), AMPLIFIED PROBE TECHNIQUE, MAKING USE OF HIGH THROUGHPUT TECHNOLOGIES AS DESCRIBED BY CMS-2020-01-R: HCPCS

## 2022-02-24 PROCEDURE — U0005 INFEC AGEN DETEC AMPLI PROBE: HCPCS

## 2022-02-25 LAB — SARS-COV-2 RNA RESP QL NAA+PROBE: NEGATIVE

## 2022-02-28 ENCOUNTER — LAB (OUTPATIENT)
Dept: URGENT CARE | Facility: URGENT CARE | Age: 62
End: 2022-02-28
Attending: FAMILY MEDICINE
Payer: COMMERCIAL

## 2022-02-28 DIAGNOSIS — Z20.822 ENCOUNTER FOR LABORATORY TESTING FOR COVID-19 VIRUS: ICD-10-CM

## 2022-02-28 PROCEDURE — U0005 INFEC AGEN DETEC AMPLI PROBE: HCPCS

## 2022-02-28 PROCEDURE — U0003 INFECTIOUS AGENT DETECTION BY NUCLEIC ACID (DNA OR RNA); SEVERE ACUTE RESPIRATORY SYNDROME CORONAVIRUS 2 (SARS-COV-2) (CORONAVIRUS DISEASE [COVID-19]), AMPLIFIED PROBE TECHNIQUE, MAKING USE OF HIGH THROUGHPUT TECHNOLOGIES AS DESCRIBED BY CMS-2020-01-R: HCPCS

## 2022-03-01 LAB — SARS-COV-2 RNA RESP QL NAA+PROBE: NEGATIVE

## 2022-03-02 ENCOUNTER — ANESTHESIA (OUTPATIENT)
Dept: SURGERY | Facility: CLINIC | Age: 62
DRG: 331 | End: 2022-03-02
Payer: COMMERCIAL

## 2022-03-02 ENCOUNTER — ANESTHESIA EVENT (OUTPATIENT)
Dept: SURGERY | Facility: CLINIC | Age: 62
DRG: 331 | End: 2022-03-02
Payer: COMMERCIAL

## 2022-03-02 ENCOUNTER — HOSPITAL ENCOUNTER (INPATIENT)
Facility: CLINIC | Age: 62
LOS: 1 days | Discharge: HOME | DRG: 331 | End: 2022-03-03
Attending: COLON & RECTAL SURGERY | Admitting: COLON & RECTAL SURGERY
Payer: COMMERCIAL

## 2022-03-02 DIAGNOSIS — D12.0 ADENOMATOUS POLYP OF CECUM: Primary | ICD-10-CM

## 2022-03-02 LAB — FLEXIBLE SIGMOIDOSCOPY: NORMAL

## 2022-03-02 PROCEDURE — 250N000025 HC SEVOFLURANE, PER MIN: Performed by: COLON & RECTAL SURGERY

## 2022-03-02 PROCEDURE — 250N000011 HC RX IP 250 OP 636: Performed by: ANESTHESIOLOGY

## 2022-03-02 PROCEDURE — 250N000013 HC RX MED GY IP 250 OP 250 PS 637: Performed by: COLON & RECTAL SURGERY

## 2022-03-02 PROCEDURE — 360N000077 HC SURGERY LEVEL 4, PER MIN: Performed by: COLON & RECTAL SURGERY

## 2022-03-02 PROCEDURE — 0DBN8ZZ EXCISION OF SIGMOID COLON, VIA NATURAL OR ARTIFICIAL OPENING ENDOSCOPIC: ICD-10-PCS | Performed by: COLON & RECTAL SURGERY

## 2022-03-02 PROCEDURE — 0DBM8ZZ EXCISION OF DESCENDING COLON, VIA NATURAL OR ARTIFICIAL OPENING ENDOSCOPIC: ICD-10-PCS | Performed by: COLON & RECTAL SURGERY

## 2022-03-02 PROCEDURE — 250N000011 HC RX IP 250 OP 636: Performed by: COLON & RECTAL SURGERY

## 2022-03-02 PROCEDURE — 0DBH4ZZ EXCISION OF CECUM, PERCUTANEOUS ENDOSCOPIC APPROACH: ICD-10-PCS | Performed by: COLON & RECTAL SURGERY

## 2022-03-02 PROCEDURE — 258N000003 HC RX IP 258 OP 636: Performed by: ANESTHESIOLOGY

## 2022-03-02 PROCEDURE — 0DTJ4ZZ RESECTION OF APPENDIX, PERCUTANEOUS ENDOSCOPIC APPROACH: ICD-10-PCS | Performed by: COLON & RECTAL SURGERY

## 2022-03-02 PROCEDURE — 258N000001 HC RX 258: Performed by: COLON & RECTAL SURGERY

## 2022-03-02 PROCEDURE — 272N000001 HC OR GENERAL SUPPLY STERILE: Performed by: COLON & RECTAL SURGERY

## 2022-03-02 PROCEDURE — 250N000009 HC RX 250: Performed by: ANESTHESIOLOGY

## 2022-03-02 PROCEDURE — 0DBP8ZZ EXCISION OF RECTUM, VIA NATURAL OR ARTIFICIAL OPENING ENDOSCOPIC: ICD-10-PCS | Performed by: COLON & RECTAL SURGERY

## 2022-03-02 PROCEDURE — 250N000009 HC RX 250: Performed by: COLON & RECTAL SURGERY

## 2022-03-02 PROCEDURE — 710N000009 HC RECOVERY PHASE 1, LEVEL 1, PER MIN: Performed by: COLON & RECTAL SURGERY

## 2022-03-02 PROCEDURE — 258N000003 HC RX IP 258 OP 636: Performed by: NURSE ANESTHETIST, CERTIFIED REGISTERED

## 2022-03-02 PROCEDURE — 88305 TISSUE EXAM BY PATHOLOGIST: CPT | Mod: TC | Performed by: COLON & RECTAL SURGERY

## 2022-03-02 PROCEDURE — 370N000017 HC ANESTHESIA TECHNICAL FEE, PER MIN: Performed by: COLON & RECTAL SURGERY

## 2022-03-02 PROCEDURE — 999N000141 HC STATISTIC PRE-PROCEDURE NURSING ASSESSMENT: Performed by: COLON & RECTAL SURGERY

## 2022-03-02 RX ORDER — CEFAZOLIN SODIUM/WATER 2 G/20 ML
2 SYRINGE (ML) INTRAVENOUS
Status: DISCONTINUED | OUTPATIENT
Start: 2022-03-02 | End: 2022-03-02 | Stop reason: HOSPADM

## 2022-03-02 RX ORDER — FENTANYL CITRATE 0.05 MG/ML
25 INJECTION, SOLUTION INTRAMUSCULAR; INTRAVENOUS EVERY 5 MIN PRN
Status: DISCONTINUED | OUTPATIENT
Start: 2022-03-02 | End: 2022-03-02 | Stop reason: HOSPADM

## 2022-03-02 RX ORDER — OXYCODONE HYDROCHLORIDE 5 MG/1
5 TABLET ORAL EVERY 6 HOURS PRN
Qty: 8 TABLET | Refills: 0 | Status: SHIPPED | OUTPATIENT
Start: 2022-03-02

## 2022-03-02 RX ORDER — NALOXONE HYDROCHLORIDE 0.4 MG/ML
0.2 INJECTION, SOLUTION INTRAMUSCULAR; INTRAVENOUS; SUBCUTANEOUS
Status: DISCONTINUED | OUTPATIENT
Start: 2022-03-02 | End: 2022-03-03 | Stop reason: HOSPADM

## 2022-03-02 RX ORDER — IBUPROFEN 600 MG/1
600 TABLET, FILM COATED ORAL EVERY 6 HOURS PRN
Status: DISCONTINUED | OUTPATIENT
Start: 2022-03-02 | End: 2022-03-03 | Stop reason: HOSPADM

## 2022-03-02 RX ORDER — MAGNESIUM HYDROXIDE 1200 MG/15ML
LIQUID ORAL PRN
Status: DISCONTINUED | OUTPATIENT
Start: 2022-03-02 | End: 2022-03-02 | Stop reason: HOSPADM

## 2022-03-02 RX ORDER — OXYCODONE HYDROCHLORIDE 5 MG/1
5 TABLET ORAL EVERY 4 HOURS PRN
Status: DISCONTINUED | OUTPATIENT
Start: 2022-03-02 | End: 2022-03-03 | Stop reason: HOSPADM

## 2022-03-02 RX ORDER — ONDANSETRON 2 MG/ML
4 INJECTION INTRAMUSCULAR; INTRAVENOUS ONCE
Status: COMPLETED | OUTPATIENT
Start: 2022-03-02 | End: 2022-03-02

## 2022-03-02 RX ORDER — HYDROMORPHONE HCL IN WATER/PF 6 MG/30 ML
0.2 PATIENT CONTROLLED ANALGESIA SYRINGE INTRAVENOUS
Status: DISCONTINUED | OUTPATIENT
Start: 2022-03-02 | End: 2022-03-03 | Stop reason: HOSPADM

## 2022-03-02 RX ORDER — FENTANYL CITRATE 50 UG/ML
INJECTION, SOLUTION INTRAMUSCULAR; INTRAVENOUS PRN
Status: DISCONTINUED | OUTPATIENT
Start: 2022-03-02 | End: 2022-03-02

## 2022-03-02 RX ORDER — OXYCODONE HYDROCHLORIDE 5 MG/1
10 TABLET ORAL EVERY 4 HOURS PRN
Status: DISCONTINUED | OUTPATIENT
Start: 2022-03-02 | End: 2022-03-03 | Stop reason: HOSPADM

## 2022-03-02 RX ORDER — HEPARIN SODIUM 5000 [USP'U]/.5ML
5000 INJECTION, SOLUTION INTRAVENOUS; SUBCUTANEOUS
Status: COMPLETED | OUTPATIENT
Start: 2022-03-02 | End: 2022-03-02

## 2022-03-02 RX ORDER — CEFAZOLIN SODIUM/WATER 2 G/20 ML
2 SYRINGE (ML) INTRAVENOUS SEE ADMIN INSTRUCTIONS
Status: DISCONTINUED | OUTPATIENT
Start: 2022-03-02 | End: 2022-03-02 | Stop reason: HOSPADM

## 2022-03-02 RX ORDER — ONDANSETRON 4 MG/1
4 TABLET, ORALLY DISINTEGRATING ORAL EVERY 30 MIN PRN
Status: DISCONTINUED | OUTPATIENT
Start: 2022-03-02 | End: 2022-03-02 | Stop reason: HOSPADM

## 2022-03-02 RX ORDER — OXYCODONE HYDROCHLORIDE 5 MG/1
5 TABLET ORAL EVERY 4 HOURS PRN
Status: DISCONTINUED | OUTPATIENT
Start: 2022-03-02 | End: 2022-03-02 | Stop reason: HOSPADM

## 2022-03-02 RX ORDER — NALOXONE HYDROCHLORIDE 0.4 MG/ML
0.4 INJECTION, SOLUTION INTRAMUSCULAR; INTRAVENOUS; SUBCUTANEOUS
Status: DISCONTINUED | OUTPATIENT
Start: 2022-03-02 | End: 2022-03-03 | Stop reason: HOSPADM

## 2022-03-02 RX ORDER — DEXMEDETOMIDINE HYDROCHLORIDE 4 UG/ML
INJECTION, SOLUTION INTRAVENOUS PRN
Status: DISCONTINUED | OUTPATIENT
Start: 2022-03-02 | End: 2022-03-02

## 2022-03-02 RX ORDER — METRONIDAZOLE 500 MG/100ML
500 INJECTION, SOLUTION INTRAVENOUS
Status: COMPLETED | OUTPATIENT
Start: 2022-03-02 | End: 2022-03-02

## 2022-03-02 RX ORDER — HYDROMORPHONE HCL IN WATER/PF 6 MG/30 ML
0.2 PATIENT CONTROLLED ANALGESIA SYRINGE INTRAVENOUS EVERY 5 MIN PRN
Status: DISCONTINUED | OUTPATIENT
Start: 2022-03-02 | End: 2022-03-02 | Stop reason: HOSPADM

## 2022-03-02 RX ORDER — PROCHLORPERAZINE MALEATE 10 MG
10 TABLET ORAL EVERY 6 HOURS PRN
Status: DISCONTINUED | OUTPATIENT
Start: 2022-03-02 | End: 2022-03-03 | Stop reason: HOSPADM

## 2022-03-02 RX ORDER — SODIUM CHLORIDE, SODIUM LACTATE, POTASSIUM CHLORIDE, CALCIUM CHLORIDE 600; 310; 30; 20 MG/100ML; MG/100ML; MG/100ML; MG/100ML
INJECTION, SOLUTION INTRAVENOUS CONTINUOUS PRN
Status: DISCONTINUED | OUTPATIENT
Start: 2022-03-02 | End: 2022-03-02

## 2022-03-02 RX ORDER — HYDROMORPHONE HCL IN WATER/PF 6 MG/30 ML
0.4 PATIENT CONTROLLED ANALGESIA SYRINGE INTRAVENOUS
Status: DISCONTINUED | OUTPATIENT
Start: 2022-03-02 | End: 2022-03-03 | Stop reason: HOSPADM

## 2022-03-02 RX ORDER — LIDOCAINE 40 MG/G
CREAM TOPICAL
Status: DISCONTINUED | OUTPATIENT
Start: 2022-03-02 | End: 2022-03-03 | Stop reason: HOSPADM

## 2022-03-02 RX ORDER — OXYCODONE HYDROCHLORIDE 5 MG/1
5 TABLET ORAL EVERY 6 HOURS PRN
Qty: 6 TABLET | Refills: 0 | Status: SHIPPED | OUTPATIENT
Start: 2022-03-02 | End: 2022-03-02

## 2022-03-02 RX ORDER — ACETAMINOPHEN 325 MG/1
975 TABLET ORAL ONCE
Status: COMPLETED | OUTPATIENT
Start: 2022-03-02 | End: 2022-03-02

## 2022-03-02 RX ORDER — ONDANSETRON 2 MG/ML
4 INJECTION INTRAMUSCULAR; INTRAVENOUS EVERY 30 MIN PRN
Status: DISCONTINUED | OUTPATIENT
Start: 2022-03-02 | End: 2022-03-02 | Stop reason: HOSPADM

## 2022-03-02 RX ORDER — LIDOCAINE HYDROCHLORIDE 20 MG/ML
INJECTION, SOLUTION INFILTRATION; PERINEURAL PRN
Status: DISCONTINUED | OUTPATIENT
Start: 2022-03-02 | End: 2022-03-02

## 2022-03-02 RX ORDER — PROPOFOL 10 MG/ML
INJECTION, EMULSION INTRAVENOUS PRN
Status: DISCONTINUED | OUTPATIENT
Start: 2022-03-02 | End: 2022-03-02

## 2022-03-02 RX ORDER — ONDANSETRON 2 MG/ML
4 INJECTION INTRAMUSCULAR; INTRAVENOUS EVERY 6 HOURS PRN
Status: DISCONTINUED | OUTPATIENT
Start: 2022-03-02 | End: 2022-03-03 | Stop reason: HOSPADM

## 2022-03-02 RX ORDER — ACETAMINOPHEN 325 MG/1
650 TABLET ORAL EVERY 6 HOURS PRN
Status: DISCONTINUED | OUTPATIENT
Start: 2022-03-02 | End: 2022-03-03 | Stop reason: HOSPADM

## 2022-03-02 RX ORDER — DEXAMETHASONE SODIUM PHOSPHATE 4 MG/ML
INJECTION, SOLUTION INTRA-ARTICULAR; INTRALESIONAL; INTRAMUSCULAR; INTRAVENOUS; SOFT TISSUE PRN
Status: DISCONTINUED | OUTPATIENT
Start: 2022-03-02 | End: 2022-03-02

## 2022-03-02 RX ORDER — EPHEDRINE SULFATE 50 MG/ML
INJECTION, SOLUTION INTRAMUSCULAR; INTRAVENOUS; SUBCUTANEOUS PRN
Status: DISCONTINUED | OUTPATIENT
Start: 2022-03-02 | End: 2022-03-02

## 2022-03-02 RX ORDER — ONDANSETRON 4 MG/1
4 TABLET, ORALLY DISINTEGRATING ORAL EVERY 6 HOURS PRN
Status: DISCONTINUED | OUTPATIENT
Start: 2022-03-02 | End: 2022-03-03 | Stop reason: HOSPADM

## 2022-03-02 RX ORDER — BUPIVACAINE HYDROCHLORIDE 5 MG/ML
INJECTION, SOLUTION PERINEURAL PRN
Status: DISCONTINUED | OUTPATIENT
Start: 2022-03-02 | End: 2022-03-02 | Stop reason: HOSPADM

## 2022-03-02 RX ORDER — SODIUM CHLORIDE, SODIUM LACTATE, POTASSIUM CHLORIDE, CALCIUM CHLORIDE 600; 310; 30; 20 MG/100ML; MG/100ML; MG/100ML; MG/100ML
INJECTION, SOLUTION INTRAVENOUS CONTINUOUS
Status: DISCONTINUED | OUTPATIENT
Start: 2022-03-02 | End: 2022-03-02 | Stop reason: HOSPADM

## 2022-03-02 RX ORDER — OXYCODONE HYDROCHLORIDE 5 MG/1
5 TABLET ORAL
Status: DISCONTINUED | OUTPATIENT
Start: 2022-03-02 | End: 2022-03-03 | Stop reason: HOSPADM

## 2022-03-02 RX ADMIN — HYDROMORPHONE HYDROCHLORIDE 0.2 MG: 0.2 INJECTION, SOLUTION INTRAMUSCULAR; INTRAVENOUS; SUBCUTANEOUS at 19:14

## 2022-03-02 RX ADMIN — LIDOCAINE HYDROCHLORIDE 100 MG: 20 INJECTION, SOLUTION INFILTRATION; PERINEURAL at 16:15

## 2022-03-02 RX ADMIN — HEPARIN SODIUM 5000 UNITS: 5000 INJECTION INTRAVENOUS; SUBCUTANEOUS at 12:49

## 2022-03-02 RX ADMIN — DEXMEDETOMIDINE HYDROCHLORIDE 4 MCG: 200 INJECTION INTRAVENOUS at 17:00

## 2022-03-02 RX ADMIN — DEXMEDETOMIDINE HYDROCHLORIDE 4 MCG: 200 INJECTION INTRAVENOUS at 18:06

## 2022-03-02 RX ADMIN — HYDROMORPHONE HYDROCHLORIDE 0.2 MG: 0.2 INJECTION, SOLUTION INTRAMUSCULAR; INTRAVENOUS; SUBCUTANEOUS at 18:58

## 2022-03-02 RX ADMIN — Medication 5 MG: at 16:47

## 2022-03-02 RX ADMIN — ONDANSETRON 4 MG: 2 INJECTION INTRAMUSCULAR; INTRAVENOUS at 18:46

## 2022-03-02 RX ADMIN — PROPOFOL 60 MG: 10 INJECTION, EMULSION INTRAVENOUS at 17:12

## 2022-03-02 RX ADMIN — ROCURONIUM BROMIDE 10 MG: 50 INJECTION, SOLUTION INTRAVENOUS at 17:00

## 2022-03-02 RX ADMIN — DEXMEDETOMIDINE HYDROCHLORIDE 4 MCG: 200 INJECTION INTRAVENOUS at 16:56

## 2022-03-02 RX ADMIN — PROPOFOL 200 MG: 10 INJECTION, EMULSION INTRAVENOUS at 16:15

## 2022-03-02 RX ADMIN — HYDROMORPHONE HYDROCHLORIDE 0.5 MG: 1 INJECTION, SOLUTION INTRAMUSCULAR; INTRAVENOUS; SUBCUTANEOUS at 17:13

## 2022-03-02 RX ADMIN — Medication 2 G: at 16:28

## 2022-03-02 RX ADMIN — SUGAMMADEX 200 MG: 100 INJECTION, SOLUTION INTRAVENOUS at 17:54

## 2022-03-02 RX ADMIN — SODIUM CHLORIDE, POTASSIUM CHLORIDE, SODIUM LACTATE AND CALCIUM CHLORIDE: 600; 310; 30; 20 INJECTION, SOLUTION INTRAVENOUS at 14:36

## 2022-03-02 RX ADMIN — DEXAMETHASONE SODIUM PHOSPHATE 4 MG: 4 INJECTION, SOLUTION INTRA-ARTICULAR; INTRALESIONAL; INTRAMUSCULAR; INTRAVENOUS; SOFT TISSUE at 16:33

## 2022-03-02 RX ADMIN — HYDROMORPHONE HYDROCHLORIDE 0.2 MG: 0.2 INJECTION, SOLUTION INTRAMUSCULAR; INTRAVENOUS; SUBCUTANEOUS at 19:26

## 2022-03-02 RX ADMIN — FENTANYL CITRATE 50 MCG: 50 INJECTION, SOLUTION INTRAMUSCULAR; INTRAVENOUS at 16:20

## 2022-03-02 RX ADMIN — PROCHLORPERAZINE EDISYLATE 5 MG: 5 INJECTION INTRAMUSCULAR; INTRAVENOUS at 19:33

## 2022-03-02 RX ADMIN — HYDROMORPHONE HYDROCHLORIDE 0.2 MG: 0.2 INJECTION, SOLUTION INTRAMUSCULAR; INTRAVENOUS; SUBCUTANEOUS at 18:46

## 2022-03-02 RX ADMIN — DEXMEDETOMIDINE HYDROCHLORIDE 8 MCG: 200 INJECTION INTRAVENOUS at 17:06

## 2022-03-02 RX ADMIN — Medication 5 MG: at 16:33

## 2022-03-02 RX ADMIN — Medication 5 MG: at 16:48

## 2022-03-02 RX ADMIN — MIDAZOLAM 2 MG: 1 INJECTION INTRAMUSCULAR; INTRAVENOUS at 16:13

## 2022-03-02 RX ADMIN — HYDROMORPHONE HYDROCHLORIDE 0.2 MG: 0.2 INJECTION, SOLUTION INTRAMUSCULAR; INTRAVENOUS; SUBCUTANEOUS at 19:48

## 2022-03-02 RX ADMIN — METRONIDAZOLE 500 MG: 500 INJECTION, SOLUTION INTRAVENOUS at 12:22

## 2022-03-02 RX ADMIN — SODIUM CHLORIDE, POTASSIUM CHLORIDE, SODIUM LACTATE AND CALCIUM CHLORIDE: 600; 310; 30; 20 INJECTION, SOLUTION INTRAVENOUS at 17:23

## 2022-03-02 RX ADMIN — ACETAMINOPHEN 975 MG: 325 TABLET, FILM COATED ORAL at 12:40

## 2022-03-02 RX ADMIN — ROCURONIUM BROMIDE 50 MG: 50 INJECTION, SOLUTION INTRAVENOUS at 16:15

## 2022-03-02 RX ADMIN — PHENYLEPHRINE HYDROCHLORIDE 100 MCG: 10 INJECTION INTRAVENOUS at 16:53

## 2022-03-02 RX ADMIN — PHENYLEPHRINE HYDROCHLORIDE 100 MCG: 10 INJECTION INTRAVENOUS at 16:57

## 2022-03-02 RX ADMIN — FENTANYL CITRATE 50 MCG: 50 INJECTION, SOLUTION INTRAMUSCULAR; INTRAVENOUS at 16:15

## 2022-03-02 RX ADMIN — ONDANSETRON 4 MG: 2 INJECTION INTRAMUSCULAR; INTRAVENOUS at 16:14

## 2022-03-02 RX ADMIN — Medication 5 MG: at 16:43

## 2022-03-02 ASSESSMENT — ENCOUNTER SYMPTOMS: SEIZURES: 0

## 2022-03-02 ASSESSMENT — LIFESTYLE VARIABLES: TOBACCO_USE: 1

## 2022-03-02 NOTE — ANESTHESIA PREPROCEDURE EVALUATION
Anesthesia Pre-Procedure Evaluation    Patient: Boom Daniel   MRN: [id number] : 1960        Procedure : Procedure(s):  INTRAOPERATIVE FLEXIBLE SIGMOIDOSCOPY WITH  LAPAROSCOPIC CECECTOMY          Past Medical History:   Diagnosis Date     Gastroesophageal reflux disease      Gout      Hepatic vein thrombosis (H)      Hypertension      Renal disease      Sleep apnea       Past Surgical History:   Procedure Laterality Date     COLONOSCOPY N/A 2021    Procedure: COLONOSCOPY, FLEXIBLE, WITH LESION REMOVAL USING SNARE;  Surgeon: Kathrin Catherine MD;  Location:  GI     COLONOSCOPY N/A 2021    Procedure: COLONOSCOPY, WITH POLYPECTOMY AND BIOPSY;  Surgeon: Kathrin Catherine MD;  Location:  GI     ORTHOPEDIC SURGERY      foot surgery      No Known Allergies   Social History     Tobacco Use     Smoking status: Former Smoker     Types: Cigarettes     Quit date: 3/19/2020     Years since quittin.9     Smokeless tobacco: Never Used   Substance Use Topics     Alcohol use: Yes     Comment: couple beers on weekend      Wt Readings from Last 1 Encounters:   22 103.6 kg (228 lb 4.8 oz)        Anesthesia Evaluation            ROS/MED HX  ENT/Pulmonary:     (+) sleep apnea, allergic rhinitis, tobacco use, Past use,     Neurologic:    (-) no seizures and no CVA   Cardiovascular:     (+) Dyslipidemia hypertension-----    METS/Exercise Tolerance:     Hematologic:       Musculoskeletal:       GI/Hepatic:     (+) GERD, liver disease (abcess),     Renal/Genitourinary:     (+) renal disease, type: CRI,     Endo:       Psychiatric/Substance Use:       Infectious Disease:     (+) MRSA,     Malignancy:       Other:            Physical Exam    Airway        Mallampati: III   TM distance: > 3 FB   Neck ROM: full     Respiratory Devices and Support         Dental       (+) caps      Cardiovascular   cardiovascular exam normal          Pulmonary           breath sounds clear to auscultation           OUTSIDE  LABS:  CBC:   Lab Results   Component Value Date    WBC 4.7 10/27/2021    WBC 5.2 10/20/2021    HGB 13.6 10/27/2021    HGB 13.0 (L) 10/20/2021    HCT 41.6 10/27/2021    HCT 39.9 (L) 10/20/2021     10/27/2021     10/20/2021     BMP:   Lab Results   Component Value Date     10/01/2021     09/29/2021    POTASSIUM 4.0 10/01/2021    POTASSIUM 3.7 09/29/2021    CHLORIDE 109 10/01/2021    CHLORIDE 100 09/29/2021    CO2 20 10/01/2021    CO2 25 09/29/2021    BUN 16 10/27/2021    BUN 19 10/20/2021    CR 1.06 10/27/2021    CR 1.11 10/20/2021     (H) 10/01/2021    GLC 98 09/29/2021     COAGS:   Lab Results   Component Value Date    PTT 48 (H) 09/25/2021    INR 1.23 (H) 09/22/2021     POC: No results found for: BGM, HCG, HCGS  HEPATIC:   Lab Results   Component Value Date    ALBUMIN 2.4 (L) 10/01/2021    PROTTOTAL 7.0 10/01/2021    ALT 31 10/01/2021    AST 32 10/27/2021    ALKPHOS 110 10/01/2021    BILITOTAL 0.4 10/01/2021     OTHER:   Lab Results   Component Value Date    LACT 2.0 09/29/2021    BEREKET 8.2 (L) 10/01/2021    MAG 2.0 09/29/2021    CRP 20.0 (H) 11/01/2021       Anesthesia Plan    ASA Status:  3      Anesthesia Type: General.     - Airway: ETT         Techniques and Equipment:     - Airway: Video-Laryngoscope         Consents    Anesthesia Plan(s) and associated risks, benefits, and realistic alternatives discussed. Questions answered and patient/representative(s) expressed understanding.    - Discussed:     - Discussed with:  Patient         Postoperative Care       PONV prophylaxis: Ondansetron (or other 5HT-3), Dexamethasone or Solumedrol, Background Propofol Infusion     Comments:                Roopa Ramriez

## 2022-03-02 NOTE — ANESTHESIA PROCEDURE NOTES
Airway       Patient location during procedure: OR       Procedure Start/Stop Times: 3/2/2022 4:18 PM  Staff -        CRNA: Otilia Candelaria APRN CRNA       Performed By: CRNA  Consent for Airway        Urgency: elective  Indications and Patient Condition       Indications for airway management: neena-procedural         Mask difficulty assessment: 1 - vent by mask    Final Airway Details       Final airway type: endotracheal airway       Successful airway: ETT - single  Endotracheal Airway Details        ETT size (mm): 8.0       Successful intubation technique: video laryngoscopy       VL Blade Size: Glidescope 4       Grade View of Cords: 1       Adjucts: stylet       Position: Right       Measured from: gums/teeth       Secured at (cm): 25       Bite block used: None    Post intubation assessment        Placement verified by: capnometry, equal breath sounds and chest rise        Number of attempts at approach: 1       Number of other approaches attempted: 0       Secured with: pink tape       Ease of procedure: easy       Dentition: Unchanged

## 2022-03-02 NOTE — BRIEF OP NOTE
[facility]    Brief Operative Note    Pre-operative diagnosis: Cecal polyp [K63.5]  Post-operative diagnosis Same as pre-operative diagnosis    Procedure: Procedure(s):  INTRAOPERATIVE FLEXIBLE SIGMOIDOSCOPY WITH POYPECTOMIES  LAPAROSCOPIC PARTIAL CECECTOMY  Surgeon: Surgeon(s) and Role:     * Kathrin Catherine MD - Primary  Anesthesia: General   Estimated Blood Loss: Minimal    Drains: None  Specimens:   ID Type Source Tests Collected by Time Destination   1 : DESCENDING COLON POLYPS X 5 Tissue Large Intestine, Colon, Descending SURGICAL PATHOLOGY EXAM Kathrin Catherine MD 3/2/2022  4:42 PM    2 : SIGMOID COLON POLYPS X2 Tissue Large Intestine, Colon, Sigmoid SURGICAL PATHOLOGY EXAM Kathrin Catherine MD 3/2/2022  4:46 PM    3 : RECTAL POLYP X 10 Tissue Large Intestine, Colon, Sigmoid/Rectal SURGICAL PATHOLOGY EXAM Kathrin Catherine MD 3/2/2022  4:47 PM    4 : Appendix and partial Cecum Tissue Large Intestine, Colon, Cecum SURGICAL PATHOLOGY EXAM Kathrin Catherine MD 3/2/2022  5:36 PM      Findings:   Normal appendix.  Polyp in specimen.  Multiple polyps in the descending, sigmoid and rectum. .  Complications: None.  Implants: * No implants in log *      Condition on discharge from OR: Satisfactory    Irvin Gleason MD   [facility]   [phone].        ADDENDUM:    PATIENT DATA  Indicate Y or N:  Home O2 No  Hemodialysis  No  Transplant patient  No  Cirrhosis  No  Steroids in last 30 days  No  Immunomodulators in last 30 days  No  Anticoagulation at time of surgery  Yes   List medication Eliquis  Prior abdominal surgery  No  Pelvic irradiation  No    Albumin within 30 days if known     Hgb within 30 days if known    Hemoglobin   Date Value Ref Range Status   10/27/2021 13.6 13.3 - 17.7 g/dL Final   ]  Cr within 30 days if known    Creatinine   Date Value Ref Range Status   10/27/2021 1.06 0.66 - 1.25 mg/dL Final   ]  Body mass index is  31.84 kg/m .      OR DATA  Emergent  No   <24 hours  No   <1 week  No  Bowel Prep No  Antibiotics  Yes  DVT prophylaxis    Heparin  Yes   SCD  Yes   None  No  Drain  No  ASA (1,2,3,4) 3  OR time (min) 83  Stents  No  Transfuse >/= 2U  No  Anastomosis   Stapled  Yes   Handsewn  No  Leak Test    Positive  No   Negative  No   Not done  Yes

## 2022-03-03 VITALS
HEART RATE: 78 BPM | RESPIRATION RATE: 16 BRPM | SYSTOLIC BLOOD PRESSURE: 170 MMHG | HEIGHT: 71 IN | BODY MASS INDEX: 31.96 KG/M2 | DIASTOLIC BLOOD PRESSURE: 94 MMHG | WEIGHT: 228.3 LBS | TEMPERATURE: 97.9 F | OXYGEN SATURATION: 95 %

## 2022-03-03 LAB — GLUCOSE BLDC GLUCOMTR-MCNC: 117 MG/DL (ref 70–99)

## 2022-03-03 PROCEDURE — 120N000001 HC R&B MED SURG/OB

## 2022-03-03 PROCEDURE — 82962 GLUCOSE BLOOD TEST: CPT

## 2022-03-03 ASSESSMENT — ACTIVITIES OF DAILY LIVING (ADL): ADLS_ACUITY_SCORE: 12

## 2022-03-03 NOTE — ANESTHESIA CARE TRANSFER NOTE
Patient: Boom Daniel    Procedure: Procedure(s):  INTRAOPERATIVE FLEXIBLE SIGMOIDOSCOPY WITH POYPECTOMIES  LAPAROSCOPIC PARTIAL CECECTOMY       Diagnosis: Cecal polyp [K63.5]  Diagnosis Additional Information: No value filed.    Anesthesia Type:   General     Note:    Oropharynx: oropharynx clear of all foreign objects and spontaneously breathing  Level of Consciousness: awake  Oxygen Supplementation: face mask  Level of Supplemental Oxygen (L/min / FiO2): 6  Independent Airway: airway patency satisfactory and stable  Dentition: dentition unchanged  Vital Signs Stable: post-procedure vital signs reviewed and stable  Report to RN Given: handoff report given  Patient transferred to: PACU    Handoff Report: Identifed the Patient, Identified the Reponsible Provider, Reviewed the pertinent medical history, Discussed the surgical course, Reviewed Intra-OP anesthesia mangement and issues during anesthesia, Set expectations for post-procedure period and Allowed opportunity for questions and acknowledgement of understanding      Vitals:  Vitals Value Taken Time   /85 03/02/22 1806   Temp     Pulse 60 03/02/22 1810   Resp 14 03/02/22 1810   SpO2 97 % 03/02/22 1810   Vitals shown include unvalidated device data.    Electronically Signed By: KAYLEEN Dalton CRNA  March 2, 2022  6:10 PM

## 2022-03-03 NOTE — PROGRESS NOTES
Pt a/o incisions c/d/i,voiding,eatting, pain controled. Dced with paperwork and belongings with wife per private car.

## 2022-03-03 NOTE — ANESTHESIA POSTPROCEDURE EVALUATION
Patient: Boom Daniel    Procedure: Procedure(s):  INTRAOPERATIVE FLEXIBLE SIGMOIDOSCOPY WITH POYPECTOMIES  LAPAROSCOPIC PARTIAL CECECTOMY       Anesthesia Type:  General    Note:  Disposition: Outpatient   Postop Pain Control: Uneventful            Sign Out: Well controlled pain   PONV: No   Neuro/Psych: Uneventful            Sign Out: Acceptable/Baseline neuro status   Airway/Respiratory: Uneventful            Sign Out: Acceptable/Baseline resp. status   CV/Hemodynamics: Uneventful            Sign Out: Acceptable CV status   Other NRE:    DID A NON-ROUTINE EVENT OCCUR? No           Last vitals:  Vitals Value Taken Time   /83 03/02/22 1900   Temp 36.1  C (96.9  F) 03/02/22 1840   Pulse 63 03/02/22 1906   Resp 13 03/02/22 1906   SpO2 92 % 03/02/22 1906   Vitals shown include unvalidated device data.    Electronically Signed By: Roopa Ramirez  March 2, 2022  7:07 PM

## 2022-03-03 NOTE — PLAN OF CARE
[id number]. A&Ox4, VSS on RA. Pain is minimal and patient denies intervention. Denies nausea. Ambulating in room IND and reports voiding well in bathroom. Lap sites with liquid bandage are CDI. Tolerating full liquids. Discharge pending improvement.

## 2022-03-03 NOTE — OP NOTE
PREOPERATIVE DIAGNOSIS:  Colon polyp at appendiceal orifice not amenable to colonoscopic resection.      POSTOPERATIVE DIAGNOSIS:  Colon polyp at appendiceal orifice and descending, sigmoid and rectal polyps.      PROCEDURES:   1.  Flexible sigmoidoscopy with polypectomy  2.  Laparoscopic partial cecectomy.     SURGEON:  Claudia Catherine MD    ASSISTANT:  Irvin Gleason MD   ([facility] Fellow)     ANESTHESIA:  General.      INDICATION:  Boom Daniel is a 62 year old male who underwent a surveillance colonoscopy several months ago. He had several polyps removed from the left-sided colon which are consistent with tubular adenomas. He also had a polyp at the appendiceal orifice which was biopsied and consistent with a sessile serrated adenoma. Unfortunately, this polyp located at the appendiceal orifice was not amenable to colonoscopic polypectomy, therefore, he was referred for surgical consultation.  The risks and benefits of proceeding with a laparoscopic appendectomy versus possible right hemicolectomy have been discussed with the patient.  He will now undergo attempted laparoscopic appendectomy with partial cecectomy.      OPERATIVE FINDINGS:  Full colonoscopy report is recorded in [facility].  The appendix was located in the usual location along the right pericolic gutter.  The appendix appeared normal.  A partial cecectomy across the top of the cecum was performed in order to completely excise the polyp located at the appendiceal orifice.  Upon opening the specimen, a margin of healthy cecum was present surrounding the appendiceal base.  The polyp itself felt soft.  The cecum appeared normal.      PROCEDURE IN DETAIL:  After informed consent was obtained, the patient was brought to the operating room and sequential compression devices were placed on bilateral lower extremities.  General anesthesia was induced without difficulty.  He was then placed in the lithotomy position and a  complete colonoscopy was performed.  The polyp located at the appendiceal orifice was visualized and a decision was made to proceed with a laparoscopic appendectomy. There were 5 polyps in the descending colon, 2 polyps in the sigmoid colon and 10 polyps in the rectum that were removed with jumbo biopsy forceps. The full details of this colonoscopy are recorded in [facility].      His abdomen was sterilely prepped and draped in standard fashion.      A 1.2 cm supraumbilical incision was made in the skin with a #15 blade.  This was carried down through the subcutaneous tissue and fascia with electrocautery.  The peritoneum was sharply entered.  A 12 mm Al trocar was inserted through the supraumbilical incision.   We placed a figure of 8 suture through the fascia using an 0 Vicryl on a UR 6 needle. The abdomen was insufflated with CO2 gas to a pressure of 15 mmHg.  A 10 mm 30 degree laparoscope was inserted into the abdomen through the supraumbilical port, and the abdomen was explored.  The operative findings are noted above. Performed bilateral TAP blocks by injecting 7.5 mL of 0.5% Marcaine into the 4 quadrants of the abdominal wall. Three additional 5 mm bladeless trocars were inserted into the abdominal cavity under direct observation.  One port was placed approximately 2 fingerbreadths above the pubic symphysis and the other one in the left lower quadrant and the last in the left upper quadrant.        The patient was placed in slight Trendelenburg with right side up.  The appendix was readily visible along the right pericolic gutter.  Adhesions between the appendix and the cecal mesentery were taken down using Bovie hook electrocautery.  The mesoappendix was taken down and divided from distal to proximal using the LigaSure device.  The mesoappendix was taken down up to the base of the appendix.  The base of the appendix at the cecum appeared normal.  The mesentery of the cecum at the site of the cecectomy was  then taken down and divided using the LigaSure device.      The 10 mm laparoscope was switched out for a 5 mm laparoscope, which was placed in the left lower quadrant port in preparation for the Endo-MACKENZIE.  The cecum was then transected below the level of the appendiceal base using an Endo-MACKENZIE 60 mm stapler with a blue load. We did use an additional load of the Endo MACKENZIE stapler to completely transect the cecum. The appendix and the top of the cecum were then placed into an Endocatch bag and brought out through the 12 mm supraumbilical port site.  The specimen was removed from the operative field, opened on the back table, and submitted for pathologic analysis.      The 12 mm trocar was reinserted into the abdominal cavity and the staple line was inspected for hemostasis, which was adequate.  The 12 mm supraumbilical port site incision with an 0 Vicryl suture been placed at the beginning of the case.  The abdomen was then allowed to desufflate, and all ports were removed from the abdominal cavity under direct visualization.  All wounds were irrigated with saline solution and the skin was closed with buried interrupted subdermal 4-0 Monocryl sutures in the 5 mm port sites, and interrupted subdermal 4-0 Monocryl sutures in the supraumbilical port site.  Dermabond was applied to all incisions.      The patient tolerated the procedure well without complications.  Estimated blood loss was 5 mL.  I was present and scrubbed for the entire duration of the operation.  The patient was extubated in the operating room and brought to the recovery room in stable condition.         Claudia Catherine MD

## 2022-03-03 NOTE — PROGRESS NOTES
[facility]  PROGRESS NOTE    March 3, 2022  Post-op Day # 1 intraoperative flexible sigmoidoscopy with polypectomies, laparoscopic partial cecectomy    SUBJECTIVE:  Doing well. Minimal pain. Up walking. Tolerating full liquids. Passing flatus. Wants to go home.    OBJECTIVE:  Temp:  [96.7  F (35.9  C)-97.9  F (36.6  C)] 97.9  F (36.6  C)  Pulse:  [54-78] 78  Resp:  [9-16] 16  BP: (111-177)/() 170/94  SpO2:  [91 %-98 %] 95 %    Intake/Output Summary (Last 24 hours) at 3/3/2022 0832  Last data filed at 3/2/2022 2006  Gross per 24 hour   Intake 1325 ml   Output 5 ml   Net 1320 ml       GENERAL:  Awake, alert, no acute distress  HEAD: Normocephalic atraumatic  SCLERA: Anicteric  EXTREMITIES: Warm and well perfused  ABDOMEN:  Soft, appropriately tender, non-distended. No guarding, rigidity, or peritoneal signs.   INCISION:  C/d/i    LABS:  Lab Results   Component Value Date    WBC 4.7 10/27/2021     Lab Results   Component Value Date    HGB 13.6 10/27/2021     Lab Results   Component Value Date    HCT 41.6 10/27/2021     Lab Results   Component Value Date     10/27/2021     Last Basic Metabolic Panel:  Lab Results   Component Value Date     10/01/2021      Lab Results   Component Value Date    POTASSIUM 4.0 10/01/2021     Lab Results   Component Value Date    CHLORIDE 109 10/01/2021     Lab Results   Component Value Date    BEREKET 8.2 10/01/2021     Lab Results   Component Value Date    CO2 20 10/01/2021     Lab Results   Component Value Date    BUN 16 10/27/2021     Lab Results   Component Value Date    CR 1.06 10/27/2021     Lab Results   Component Value Date     03/03/2022     10/01/2021       ASSESSMENT/PLAN: 63 yo M with unresectable cecal polyp POD#1 s/p laparoscopic partial cecectomy. Stable. Discharge home.    1. Low fiber diet  2. PRN pain meds  3. OOB, ambulate  4. Await path   5. Discharge home    Discussed with Dr. Catherine.    For questions/paging, please contact the  CRS office at [phone].    Dean Alvarado PA-C  Colorectal Physician Assistant    [facility]  [address]  [place] [address]  T: [phone]  F: [phone]        [facility]  I performed a history and physical examination of the patient and discussed their management with the physician assistant. I reviewed the physician assistants note and agree with the documented findings and plan of care.     Claudia Catherine MD, [facility]    [facility]  [address]  [place] [address]  T: [phone]  F: [phone]

## 2022-03-03 NOTE — DISCHARGE INSTRUCTIONS
Today you were given 975 mg of Tylenol at 12:40pm. The recommended daily maximum dose is 4000 mg.       **Because you had anesthesia today and your history of sleep apnea, it is extremely important that you use your CPAP machine for the next 24 hours while napping or sleeping.**      Restart Eliquis in 4 days.     Same Day Surgery Discharge Instructions for  Sedation and General Anesthesia       It's not unusual to feel dizzy, light-headed or faint for up to 24 hours after surgery or while taking pain medication.  If you have these symptoms: sit for a few minutes before standing and have someone assist you when you get up to walk or use the bathroom.      You should rest and relax for the next 24 hours. We recommend you make arrangements to have an adult stay with you for at least 24 hours after your discharge.  Avoid hazardous and strenuous activity.      DO NOT DRIVE any vehicle or operate mechanical equipment for 24 hours following the end of your surgery.  Even though you may feel normal, your reactions may be affected by the medication you have received.      Do not drink alcoholic beverages for 24 hours following surgery.       Slowly progress to your regular diet as you feel able. It's not unusual to feel nauseated and/or vomit after receiving anesthesia.  If you develop these symptoms, drink clear liquids (apple juice, ginger ale, broth, 7-up, etc. ) until you feel better.  If your nausea and vomiting persists for 24 hours, please notify your surgeon.        All narcotic pain medications, along with inactivity and anesthesia, can cause constipation. Drinking plenty of liquids and increasing fiber intake will help.      For any questions of a medical nature, call your surgeon.      Do not make important decisions for 24 hours.      If you had general anesthesia, you may have a sore throat for a couple of days related to the breathing tube used during surgery.  You may use Cepacol lozenges to help with this  discomfort.  If it worsens or if you develop a fever, contact your surgeon.       If you feel your pain is not well managed with the pain medications prescribed by your surgeon, please contact your surgeon's office to let them know so they can address your concerns.       CoVid 19 Information    We want to give you information regarding Covid. Please consult your primary care provider with any questions you might have.     Patient who have symptoms (cough, fever, or shortness of breath), need to isolate for 7 days from when symptoms started OR 72 hours after fever resolves (without fever reducing medications) AND improvement of respiratory symptoms (whichever is longer).      Isolate yourself at home (in own room/own bathroom if possible)    Do Not allow any visitors    Do Not go to work or school    Do Not go to [employer],  centers, shopping, or other public places.    Do Not shake hands.    Avoid close and intimate contact with others (hugging, kissing).    Follow CDC recommendations for household cleaning of frequently touched services.     After the initial 7 days, continue to isolate yourself from household members as much as possible. To continue decrease the risk of community spread and exposure, you and any members of your household should limit activities in public for 14 days after starting home isolation.     You can reference the following CDC link for helpful home isolation/care tips:  https://www.cdc.gov/coronavirus/2019-ncov/downloads/10Things.pdf    Protect Others:    Cover Your Mouth and Nose with a mask, disposable tissue or wash cloth to avoid spreading germs to others.    Wash your hands and face frequently with soap and water    Call Your Primary Doctor If: Breathing difficulty develops or you become worse.    For more information about COVID19 and options for caring for yourself at home, please visit the CDC website at  https://www.cdc.gov/coronavirus/2019-ncov/about/steps-when-sick.html  For more options for care at [facility], please visit our website at [email or web address]    **If you have questions or concerns about your procedure,  call Dr. Catherine at [phone]**

## 2022-03-03 NOTE — DISCHARGE SUMMARY
[facility] Discharge Summary      Boom Daniel MRN# [id number]   Age: 62 year old YOB: 1960     Date of Admission:  3/2/2022  Date of Discharge::  3/3/2022  Admitting Physician:  Kathrin Catherine MD  Discharge Physician:  Kathrin Catherine MD     PCP:  [facility]    Disposition: Patient discharged from [facility] to home in stable condition.        Primary Diagnosis:   Cecal polyp            Discharge Medications:     Current Discharge Medication List      START taking these medications    Details   oxyCODONE (ROXICODONE) 5 MG tablet Take 1 tablet (5 mg) by mouth every 6 hours as needed for moderate to severe pain  Qty: 8 tablet, Refills: 0    Associated Diagnoses: Adenomatous polyp of cecum         CONTINUE these medications which have NOT CHANGED    Details   losartan (COZAAR) 50 MG tablet Take 50 mg by mouth daily      omeprazole (PRILOSEC) 20 MG DR capsule Take 20 mg by mouth daily      apixaban ANTICOAGULANT (ELIQUIS) 5 MG tablet Take 1 tablet (5 mg) by mouth 2 times daily  Qty: 180 tablet, Refills: 1    Associated Diagnoses: Hepatic vein thrombosis (H)                    Follow Up, Special Instructions:     Discharge diet: Low residue   Discharge activity: Lifting restricted to <15 pounds for 6 weeks   Discharge follow-up: Follow up with Dr. Catherine in 4 weeks   Wound care: May get incision wet in shower but do not soak or scrub              Procedures:     Procedure(s): intraoperative flexible sigmoidoscopy with polypectomies, laparoscopic partial cecectomy                 Consultations:   None          Brief Hospital Summary:     Patient is a 62 year old male who underwent intraoperative flexible sigmoidoscopy with polypectomies, laparoscopic partial cecectomy on 3/2/22 by Dr. Catherine.   There were no immediate complications during this procedure.    Please refer to the full operative summary for details.  The patient's hospital course was  unremarkable.  Pain was controlled on oral pain regimen.  He was tolerating a low fiber diet.  Bowel function had returned prior to discharge.  He recovered as anticipated and experienced no post-operative complications.         Attestation:  I have reviewed today's vital signs, notes, medications, labs and imaging.    Dean Alvarado PA-C  Colorectal Physician Assistant    [facility]  [address] [facility] [address]  [place] [address]  T: [phone]  F: [phone]            ADDENDUM:  Length of stay: 1 days  Indicate Y or N for the following:  UTI  No  C diff  No  PNA  No  SSI No  DVT No  PE  No  CVA No  MI No  Enterocutaneous fistula  No  Peripheral nerve injury  No  Abscess (not adjacent to anastomosis)  No  Leak No    Treated with:   Antibiotics N/A   Drain  N/A   Reoperation  N/A  Death within 30 days No  Reintubation  No  Reoperation  No   Procedure N/A

## 2022-03-03 NOTE — OR NURSING
Dr Ramirez at bedside, patient stable, still reporting pain and nausea.  Ok to discharge or transfer when ready.

## 2022-03-04 ENCOUNTER — PATIENT OUTREACH (OUTPATIENT)
Dept: CARE COORDINATION | Facility: CLINIC | Age: 62
End: 2022-03-04

## 2022-03-04 DIAGNOSIS — Z71.89 OTHER SPECIFIED COUNSELING: ICD-10-CM

## 2022-03-04 LAB
PATH REPORT.COMMENTS IMP SPEC: NORMAL
PATH REPORT.FINAL DX SPEC: NORMAL
PATH REPORT.GROSS SPEC: NORMAL
PATH REPORT.MICROSCOPIC SPEC OTHER STN: NORMAL
PATH REPORT.RELEVANT HX SPEC: NORMAL
PHOTO IMAGE: NORMAL

## 2022-03-04 PROCEDURE — 88307 TISSUE EXAM BY PATHOLOGIST: CPT | Mod: 26 | Performed by: PATHOLOGY

## 2022-03-04 PROCEDURE — 88305 TISSUE EXAM BY PATHOLOGIST: CPT | Mod: 26 | Performed by: PATHOLOGY

## 2022-03-04 NOTE — PROGRESS NOTES
Clinic Care Coordination Contact  [facility]/Voicemail       Clinical Data: Care Coordinator Outreach  Outreach attempted x 1.  Left message on patient's voicemail with call back information and requested return call.  Plan:  Care Coordinator will try to reach patient again in 1-2 business days.    Love Lara  Care Transitions Assistant  [facility]

## 2022-03-05 NOTE — PROGRESS NOTES
Clinic Care Coordination Contact  [facility]/Voicemail       Clinical Data: Care Coordinator Outreach  Outreach attempted x 2.  Left message on patient's voicemail with call back information and requested return call.  Plan:  Care Coordinator will do no further outreaches at this time.    Jasen De La Garza  Community Health Worker  [facility]  Ph:[phone]

## 2022-10-15 ENCOUNTER — HEALTH MAINTENANCE LETTER (OUTPATIENT)
Age: 62
End: 2022-10-15

## 2022-12-03 ENCOUNTER — HEALTH MAINTENANCE LETTER (OUTPATIENT)
Age: 62
End: 2022-12-03

## 2024-01-07 ENCOUNTER — HEALTH MAINTENANCE LETTER (OUTPATIENT)
Age: 64
End: 2024-01-07

## 2024-11-11 NOTE — PROGRESS NOTES
This is a recent snapshot of the patient's [facility] medical record.  For current drug dose and complete information and questions, call [phone] or In Basket pool, [facility] ([id number])  CSN Number:  [id number]       36.7

## 2025-02-22 ENCOUNTER — HEALTH MAINTENANCE LETTER (OUTPATIENT)
Age: 65
End: 2025-02-22

## (undated) DEVICE — SUCTION CANISTER STRAW 65652-008

## (undated) DEVICE — KIT PATIENT POSITIONING PIGAZZI LATEX FREE 40580

## (undated) DEVICE — LINEN HALF SHEET 5512

## (undated) DEVICE — PACK MAJOR SBA15MAFSI

## (undated) DEVICE — STPL LINE REINFORCEMENT 60MM ECHELON ECH60R

## (undated) DEVICE — JELLY LUBRICATING SURGILUBE 4OZ TUBE

## (undated) DEVICE — BAG CLEAR TRASH 1.3M 39X33" P4040C

## (undated) DEVICE — GOWN LG DISP 9515

## (undated) DEVICE — SU VICRYL 0 UR-6 27" J603H

## (undated) DEVICE — ESU GROUND PAD UNIVERSAL W/O CORD

## (undated) DEVICE — SOL NACL 0.9% IRRIG 1000ML BOTTLE 2F7124

## (undated) DEVICE — PAD CHUX UNDERPAD 30X30"

## (undated) DEVICE — MANIFOLD NEPTUNE 4 PORT 700-20

## (undated) DEVICE — BAG RED BIOHAZARD

## (undated) DEVICE — DRAPE IOBAN INCISE 23X17" 6650EZ

## (undated) DEVICE — LINEN TOWEL PACK X5 5464

## (undated) DEVICE — STPL POWERED ECHELON 60MM PSEE60A

## (undated) DEVICE — GLOVE PROTEXIS BLUE W/NEU-THERA 6.5  2D73EB65

## (undated) DEVICE — SYSTEM CLEARIFY VISUALIZATION 21-345

## (undated) DEVICE — DRAPE LAP W/ARMBOARD 29410

## (undated) DEVICE — ESU LIGASURE LAPAROSCOPIC BLUNT TIP SEALER 5MMX37CM LF1837

## (undated) DEVICE — GLOVE PROTEXIS W/NEU-THERA 7.0  2D73TE70

## (undated) DEVICE — LINEN FULL SHEET 5511

## (undated) DEVICE — SUCTION TIP POOLE K770

## (undated) DEVICE — BASIN SET MINOR DISP

## (undated) DEVICE — GLOVE PROTEXIS MICRO 6.5  2D73PM65

## (undated) DEVICE — SU PDS II 0 CTX 60" Z990G

## (undated) DEVICE — PREP CHLORAPREP 26ML TINTED ORANGE  260815

## (undated) DEVICE — Device

## (undated) DEVICE — ENDO POUCH UNIV RETRIEVAL SYSTEM INZII 10MM CD001

## (undated) DEVICE — ESU CORD MONOPOLAR 10'  E0510

## (undated) DEVICE — BLADE CLIPPER SGL USE 9680

## (undated) DEVICE — SU MONOCRYL 4-0 PS-2 18" UND Y496G

## (undated) DEVICE — FCP BIOPSY RADIAL JAW 4 JUMBO 3.2MM CHANNEL M00513370

## (undated) DEVICE — STPL RELOAD REG TISSUE ECHELON 60 X 3.6MM BLUE GST60B

## (undated) DEVICE — ENDO TROCAR SLEEVE KII Z-THREADED 05X100MM CTS02

## (undated) DEVICE — SU VICRYL 3-0 SH CR 8X18" J774

## (undated) DEVICE — PREP POVIDONE IODINE SOLUTION 10% 120ML

## (undated) DEVICE — SOL WATER IRRIG 1000ML BOTTLE 2F7114

## (undated) DEVICE — EVAC SYSTEM CLEAR FLOW SC082500

## (undated) DEVICE — ENDO TROCAR FIRST ENTRY KII FIOS Z-THRD 05X100MM CTF03

## (undated) DEVICE — ESU HOLDER LAP INST DISP PURPLE LONG 330MM H-PRO-330

## (undated) RX ORDER — FENTANYL CITRATE 50 UG/ML
INJECTION, SOLUTION INTRAMUSCULAR; INTRAVENOUS
Status: DISPENSED
Start: 2021-09-22

## (undated) RX ORDER — FENTANYL CITRATE 50 UG/ML
INJECTION, SOLUTION INTRAMUSCULAR; INTRAVENOUS
Status: DISPENSED
Start: 2022-03-02

## (undated) RX ORDER — HYDROMORPHONE HCL IN WATER/PF 6 MG/30 ML
PATIENT CONTROLLED ANALGESIA SYRINGE INTRAVENOUS
Status: DISPENSED
Start: 2022-03-02

## (undated) RX ORDER — NALOXONE HYDROCHLORIDE 0.4 MG/ML
INJECTION, SOLUTION INTRAMUSCULAR; INTRAVENOUS; SUBCUTANEOUS
Status: DISPENSED
Start: 2021-09-22

## (undated) RX ORDER — NALOXONE HYDROCHLORIDE 0.4 MG/ML
INJECTION, SOLUTION INTRAMUSCULAR; INTRAVENOUS; SUBCUTANEOUS
Status: DISPENSED
Start: 2021-10-01

## (undated) RX ORDER — METRONIDAZOLE 500 MG/100ML
INJECTION, SOLUTION INTRAVENOUS
Status: DISPENSED
Start: 2022-03-02

## (undated) RX ORDER — PROPOFOL 10 MG/ML
INJECTION, EMULSION INTRAVENOUS
Status: DISPENSED
Start: 2022-03-02

## (undated) RX ORDER — FLUMAZENIL 0.1 MG/ML
INJECTION, SOLUTION INTRAVENOUS
Status: DISPENSED
Start: 2021-09-22

## (undated) RX ORDER — ONDANSETRON 2 MG/ML
INJECTION INTRAMUSCULAR; INTRAVENOUS
Status: DISPENSED
Start: 2022-03-02

## (undated) RX ORDER — CEFAZOLIN SODIUM 1 G/3ML
INJECTION, POWDER, FOR SOLUTION INTRAMUSCULAR; INTRAVENOUS
Status: DISPENSED
Start: 2022-03-02

## (undated) RX ORDER — HEPARIN SODIUM 5000 [USP'U]/.5ML
INJECTION, SOLUTION INTRAVENOUS; SUBCUTANEOUS
Status: DISPENSED
Start: 2022-03-02

## (undated) RX ORDER — GLYCOPYRROLATE 0.2 MG/ML
INJECTION, SOLUTION INTRAMUSCULAR; INTRAVENOUS
Status: DISPENSED
Start: 2022-03-02

## (undated) RX ORDER — ACETAMINOPHEN 325 MG/1
TABLET ORAL
Status: DISPENSED
Start: 2022-03-02

## (undated) RX ORDER — LIDOCAINE HYDROCHLORIDE 20 MG/ML
INJECTION, SOLUTION EPIDURAL; INFILTRATION; INTRACAUDAL; PERINEURAL
Status: DISPENSED
Start: 2022-03-02

## (undated) RX ORDER — HYDROMORPHONE HYDROCHLORIDE 1 MG/ML
INJECTION, SOLUTION INTRAMUSCULAR; INTRAVENOUS; SUBCUTANEOUS
Status: DISPENSED
Start: 2022-03-02

## (undated) RX ORDER — DEXAMETHASONE SODIUM PHOSPHATE 4 MG/ML
INJECTION, SOLUTION INTRA-ARTICULAR; INTRALESIONAL; INTRAMUSCULAR; INTRAVENOUS; SOFT TISSUE
Status: DISPENSED
Start: 2022-03-02

## (undated) RX ORDER — FENTANYL CITRATE 50 UG/ML
INJECTION, SOLUTION INTRAMUSCULAR; INTRAVENOUS
Status: DISPENSED
Start: 2021-10-01

## (undated) RX ORDER — FENTANYL CITRATE 50 UG/ML
INJECTION, SOLUTION INTRAMUSCULAR; INTRAVENOUS
Status: DISPENSED
Start: 2021-12-06

## (undated) RX ORDER — FLUMAZENIL 0.1 MG/ML
INJECTION, SOLUTION INTRAVENOUS
Status: DISPENSED
Start: 2021-10-01